# Patient Record
Sex: FEMALE | Race: WHITE | Employment: OTHER | URBAN - METROPOLITAN AREA
[De-identification: names, ages, dates, MRNs, and addresses within clinical notes are randomized per-mention and may not be internally consistent; named-entity substitution may affect disease eponyms.]

---

## 2021-01-11 ENCOUNTER — OFFICE VISIT (OUTPATIENT)
Dept: RHEUMATOLOGY | Facility: CLINIC | Age: 66
End: 2021-01-11
Payer: MEDICARE

## 2021-01-11 VITALS
WEIGHT: 138 LBS | SYSTOLIC BLOOD PRESSURE: 118 MMHG | DIASTOLIC BLOOD PRESSURE: 68 MMHG | RESPIRATION RATE: 16 BRPM | TEMPERATURE: 98 F | HEART RATE: 60 BPM | BODY MASS INDEX: 24.45 KG/M2 | HEIGHT: 63 IN

## 2021-01-11 DIAGNOSIS — M81.0 OSTEOPOROSIS, UNSPECIFIED OSTEOPOROSIS TYPE, UNSPECIFIED PATHOLOGICAL FRACTURE PRESENCE: ICD-10-CM

## 2021-01-11 DIAGNOSIS — R53.82 CHRONIC FATIGUE, UNSPECIFIED: ICD-10-CM

## 2021-01-11 DIAGNOSIS — E55.9 VITAMIN D DEFICIENCY: ICD-10-CM

## 2021-01-11 DIAGNOSIS — M06.9 RHEUMATOID ARTHRITIS INVOLVING MULTIPLE SITES, UNSPECIFIED WHETHER RHEUMATOID FACTOR PRESENT (HCC): Primary | ICD-10-CM

## 2021-01-11 DIAGNOSIS — M15.9 PRIMARY OSTEOARTHRITIS INVOLVING MULTIPLE JOINTS: ICD-10-CM

## 2021-01-11 DIAGNOSIS — M25.532 PAIN IN BOTH WRISTS: ICD-10-CM

## 2021-01-11 DIAGNOSIS — M25.561 CHRONIC PAIN OF BOTH KNEES: ICD-10-CM

## 2021-01-11 DIAGNOSIS — M25.562 CHRONIC PAIN OF BOTH KNEES: ICD-10-CM

## 2021-01-11 DIAGNOSIS — Z79.899 HIGH RISK MEDICATION USE: ICD-10-CM

## 2021-01-11 DIAGNOSIS — G89.29 CHRONIC PAIN OF BOTH KNEES: ICD-10-CM

## 2021-01-11 DIAGNOSIS — M25.531 PAIN IN BOTH WRISTS: ICD-10-CM

## 2021-01-11 DIAGNOSIS — Z96.653 HISTORY OF BILATERAL KNEE REPLACEMENT: ICD-10-CM

## 2021-01-11 PROCEDURE — 99205 OFFICE O/P NEW HI 60 MIN: CPT | Performed by: INTERNAL MEDICINE

## 2021-01-11 RX ORDER — ETANERCEPT 50 MG/ML
SOLUTION SUBCUTANEOUS
COMMUNITY
Start: 2020-12-01 | End: 2021-06-18

## 2021-01-11 RX ORDER — FOLIC ACID/MULTIVIT,IRON,MINER 0.4MG-18MG
TABLET ORAL
COMMUNITY

## 2021-01-11 RX ORDER — LEUCOVORIN CALCIUM 5 MG/1
TABLET ORAL
COMMUNITY
Start: 2020-10-08 | End: 2021-01-11

## 2021-01-11 RX ORDER — LEUCOVORIN CALCIUM 5 MG/1
5 TABLET ORAL DAILY
Qty: 90 TABLET | Refills: 0 | Status: SHIPPED | OUTPATIENT
Start: 2021-01-11 | End: 2021-04-11

## 2021-01-11 RX ORDER — LEVOTHYROXINE SODIUM 88 UG/1
TABLET ORAL
COMMUNITY
Start: 2020-10-26

## 2021-01-11 NOTE — PROGRESS NOTES
?  RHEUMATOLOGY NEW PATIENT   Date of visit: 1/11/2021  ? Patient presents with:  Establish Care: new pt. Previous rheum Dr. Cesar Hendrickson ([facility] [place]). Dx with RA about 7 years ago. Both knees replaced and surgery on both wrists.  Join independently interpreting results and communicating results to the patient/family/caregiver and care coordination with the patient's other providers.   ?  Plan:  Diagnoses and all orders for this visit:    Rheumatoid arthritis involving multiple sites, uns came back. Was seen by rheumatologist in [place]. Switched rheum over time due to initial one retiring. Was started on combination of Enbrel and methotrexate about 7 years ago. Was on higher methotrexate dosing but had side effect- due to nausea.  Improv to conceive afterwards and pregnancy relatively normal   + chronic constipation, no blood in stools; last colonoscopy was normal per pt.  No diverticulosis/diverticulitis   + hx of plantar fasciitis, improved with in soles   + rare bloody nose, attributes t file    Medications:    •  ENBREL SURECLICK 50 MG/ML Subcutaneous Solution Auto-injector, , Disp: , Rfl:     •  Levothyroxine Sodium 88 MCG Oral Tab, , Disp: , Rfl:     •  Turmeric Curcumin 500 MG Oral Cap, Take by mouth., Disp: , Rfl:     •  Krill Oil 350 Genitourinary: Negative for frequency and urgency. Musculoskeletal: Positive for back pain, joint pain and neck pain. Skin: Negative for itching and rash. Neurological: Negative for dizziness, tingling, seizures, weakness and headaches.    Endo/Heme Lymphadenopathy:     She has no cervical adenopathy. Neurological: She is alert and oriented to person, place, and time. No cranial nerve deficit. Skin: Skin is warm and dry. No rash noted. She is not diaphoretic. No erythema.    Psychiatric: She has

## 2021-01-27 ENCOUNTER — LAB ENCOUNTER (OUTPATIENT)
Dept: LAB | Age: 66
End: 2021-01-27
Attending: INTERNAL MEDICINE
Payer: MEDICARE

## 2021-01-27 ENCOUNTER — TELEPHONE (OUTPATIENT)
Dept: RHEUMATOLOGY | Facility: CLINIC | Age: 66
End: 2021-01-27

## 2021-01-27 DIAGNOSIS — Z79.899 HIGH RISK MEDICATION USE: ICD-10-CM

## 2021-01-27 DIAGNOSIS — E55.9 VITAMIN D DEFICIENCY: ICD-10-CM

## 2021-01-27 DIAGNOSIS — R53.82 CHRONIC FATIGUE, UNSPECIFIED: ICD-10-CM

## 2021-01-27 DIAGNOSIS — M06.9 RHEUMATOID ARTHRITIS INVOLVING MULTIPLE SITES, UNSPECIFIED WHETHER RHEUMATOID FACTOR PRESENT (HCC): ICD-10-CM

## 2021-01-27 LAB
ALBUMIN SERPL-MCNC: 3.8 G/DL (ref 3.4–5)
ALBUMIN/GLOB SERPL: 1.1 {RATIO} (ref 1–2)
ALP LIVER SERPL-CCNC: 78 U/L
ALT SERPL-CCNC: 29 U/L
ANION GAP SERPL CALC-SCNC: 2 MMOL/L (ref 0–18)
AST SERPL-CCNC: 30 U/L (ref 15–37)
BASOPHILS # BLD AUTO: 0.04 X10(3) UL (ref 0–0.2)
BASOPHILS NFR BLD AUTO: 1 %
BILIRUB SERPL-MCNC: 0.4 MG/DL (ref 0.1–2)
BUN BLD-MCNC: 18 MG/DL (ref 7–18)
BUN/CREAT SERPL: 25.7 (ref 10–20)
CALCIUM BLD-MCNC: 9.8 MG/DL (ref 8.5–10.1)
CHLORIDE SERPL-SCNC: 106 MMOL/L (ref 98–112)
CO2 SERPL-SCNC: 32 MMOL/L (ref 21–32)
CREAT BLD-MCNC: 0.7 MG/DL
CRP SERPL-MCNC: <0.29 MG/DL (ref ?–0.3)
DEPRECATED RDW RBC AUTO: 52 FL (ref 35.1–46.3)
EOSINOPHIL # BLD AUTO: 0.11 X10(3) UL (ref 0–0.7)
EOSINOPHIL NFR BLD AUTO: 2.8 %
ERYTHROCYTE [DISTWIDTH] IN BLOOD BY AUTOMATED COUNT: 15.6 % (ref 11–15)
GLOBULIN PLAS-MCNC: 3.4 G/DL (ref 2.8–4.4)
GLUCOSE BLD-MCNC: 84 MG/DL (ref 70–99)
HAV IGM SER QL: NONREACTIVE
HBV CORE IGM SER QL: NONREACTIVE
HBV SURFACE AG SERPL QL IA: NONREACTIVE
HCT VFR BLD AUTO: 41.1 %
HCV AB SERPL QL IA: NONREACTIVE
HGB BLD-MCNC: 13 G/DL
IMM GRANULOCYTES # BLD AUTO: 0 X10(3) UL (ref 0–1)
IMM GRANULOCYTES NFR BLD: 0 %
LYMPHOCYTES # BLD AUTO: 1.38 X10(3) UL (ref 1–4)
LYMPHOCYTES NFR BLD AUTO: 34.8 %
M PROTEIN MFR SERPL ELPH: 7.2 G/DL (ref 6.4–8.2)
MCH RBC QN AUTO: 28.8 PG (ref 26–34)
MCHC RBC AUTO-ENTMCNC: 31.6 G/DL (ref 31–37)
MCV RBC AUTO: 90.9 FL
MONOCYTES # BLD AUTO: 0.38 X10(3) UL (ref 0.1–1)
MONOCYTES NFR BLD AUTO: 9.6 %
NEUTROPHILS # BLD AUTO: 2.06 X10 (3) UL (ref 1.5–7.7)
NEUTROPHILS # BLD AUTO: 2.06 X10(3) UL (ref 1.5–7.7)
NEUTROPHILS NFR BLD AUTO: 51.8 %
OSMOLALITY SERPL CALC.SUM OF ELEC: 291 MOSM/KG (ref 275–295)
PATIENT FASTING Y/N/NP: YES
PLATELET # BLD AUTO: 245 10(3)UL (ref 150–450)
POTASSIUM SERPL-SCNC: 4.4 MMOL/L (ref 3.5–5.1)
RBC # BLD AUTO: 4.52 X10(6)UL
SED RATE-ML: 11 MM/HR
SODIUM SERPL-SCNC: 140 MMOL/L (ref 136–145)
VIT D+METAB SERPL-MCNC: 54 NG/ML (ref 30–100)
WBC # BLD AUTO: 4 X10(3) UL (ref 4–11)

## 2021-01-27 PROCEDURE — 85652 RBC SED RATE AUTOMATED: CPT

## 2021-01-27 PROCEDURE — 80053 COMPREHEN METABOLIC PANEL: CPT

## 2021-01-27 PROCEDURE — 86480 TB TEST CELL IMMUN MEASURE: CPT

## 2021-01-27 PROCEDURE — 80074 ACUTE HEPATITIS PANEL: CPT

## 2021-01-27 PROCEDURE — 82306 VITAMIN D 25 HYDROXY: CPT

## 2021-01-27 PROCEDURE — 36415 COLL VENOUS BLD VENIPUNCTURE: CPT

## 2021-01-27 PROCEDURE — 86140 C-REACTIVE PROTEIN: CPT

## 2021-01-27 PROCEDURE — 85025 COMPLETE CBC W/AUTO DIFF WBC: CPT

## 2021-01-27 NOTE — TELEPHONE ENCOUNTER
Called pt and discussed that I received her records. Pt did not know she could get labs without physical orders. Will plan on getting updated labs for me sometime this week.   discussed that depending on these results, will make decision for next steps in m

## 2021-01-29 LAB
M TB IFN-G CD4+ T-CELLS BLD-ACNC: 0.03 IU/ML
M TB TUBERC IFN-G BLD QL: NEGATIVE
M TB TUBERC IGNF/MITOGEN IGNF CONTROL: 8.68 IU/ML
QUANTIFERON TB1 MINUS NIL: 0.01 IU/ML
QUANTIFERON TB2 MINUS NIL: 0.01 IU/ML

## 2021-02-01 ENCOUNTER — TELEPHONE (OUTPATIENT)
Dept: RHEUMATOLOGY | Facility: CLINIC | Age: 66
End: 2021-02-01

## 2021-02-01 DIAGNOSIS — Z79.899 HIGH RISK MEDICATION USE: ICD-10-CM

## 2021-02-01 DIAGNOSIS — M06.9 RHEUMATOID ARTHRITIS INVOLVING MULTIPLE SITES, UNSPECIFIED WHETHER RHEUMATOID FACTOR PRESENT (HCC): Primary | ICD-10-CM

## 2021-02-01 NOTE — TELEPHONE ENCOUNTER
Called pt and discussed test results at length. Labs grossly normal including inflammatory markers. Would avoid stepping up immunosuppression at this time. Pt agrees and thinks more swelling due to surgery rather than active RA.      Also discussed holding

## 2021-04-14 ENCOUNTER — TELEPHONE (OUTPATIENT)
Dept: ORTHOPEDICS CLINIC | Facility: CLINIC | Age: 66
End: 2021-04-14

## 2021-04-14 DIAGNOSIS — M25.562 PAIN IN BOTH KNEES, UNSPECIFIED CHRONICITY: Primary | ICD-10-CM

## 2021-04-14 DIAGNOSIS — M25.561 PAIN IN BOTH KNEES, UNSPECIFIED CHRONICITY: Primary | ICD-10-CM

## 2021-04-14 NOTE — TELEPHONE ENCOUNTER
Bilateral knee x-rays ordered
Confirmed irina townsend that it is brad knees
Patient is scheduled on 4/30/2021 with Dr. Shahbaz Nelson for fluid in her knees. Please advise if imaging is needed.
CONST: mild distress 2/2 pain.   EYES:  Sclera and conjunctiva clear.  CARD: Normal S1 S2; Normal rate and rhythm  RESP: Equal BS B/L, No wheezes, rhonchi or rales. No distress  GI: Soft, + TTP RLQ, no rebound or guarding, no CVA tenderness, non-distended.  MS: Normal ROM in all extremities. No edema of lower extremities, no calf pain, radial pulses 2+ bilaterally  SKIN: Warm, dry, no acute rashes. Good turgor  NEURO: A&Ox3, No focal deficits. Strength 5/5 with no sensory deficits

## 2021-04-19 ENCOUNTER — LAB ENCOUNTER (OUTPATIENT)
Dept: LAB | Age: 66
End: 2021-04-19
Attending: INTERNAL MEDICINE
Payer: MEDICARE

## 2021-04-30 ENCOUNTER — LAB ENCOUNTER (OUTPATIENT)
Dept: LAB | Age: 66
End: 2021-04-30
Attending: INTERNAL MEDICINE
Payer: MEDICARE

## 2021-04-30 ENCOUNTER — OFFICE VISIT (OUTPATIENT)
Dept: RHEUMATOLOGY | Facility: CLINIC | Age: 66
End: 2021-04-30
Payer: MEDICARE

## 2021-04-30 VITALS
WEIGHT: 135 LBS | HEIGHT: 63 IN | DIASTOLIC BLOOD PRESSURE: 70 MMHG | HEART RATE: 60 BPM | SYSTOLIC BLOOD PRESSURE: 110 MMHG | TEMPERATURE: 98 F | BODY MASS INDEX: 23.92 KG/M2

## 2021-04-30 DIAGNOSIS — M25.461 EFFUSION OF RIGHT KNEE: ICD-10-CM

## 2021-04-30 DIAGNOSIS — M25.462 SWELLING OF LEFT KNEE JOINT: Primary | ICD-10-CM

## 2021-04-30 DIAGNOSIS — M06.9 RHEUMATOID ARTHRITIS INVOLVING MULTIPLE SITES, UNSPECIFIED WHETHER RHEUMATOID FACTOR PRESENT (HCC): ICD-10-CM

## 2021-04-30 DIAGNOSIS — M25.462 EFFUSION, LEFT KNEE: Primary | ICD-10-CM

## 2021-04-30 PROCEDURE — 89060 EXAM SYNOVIAL FLUID CRYSTALS: CPT

## 2021-04-30 PROCEDURE — 99213 OFFICE O/P EST LOW 20 MIN: CPT | Performed by: INTERNAL MEDICINE

## 2021-04-30 PROCEDURE — 89050 BODY FLUID CELL COUNT: CPT

## 2021-04-30 PROCEDURE — 20610 DRAIN/INJ JOINT/BURSA W/O US: CPT | Performed by: INTERNAL MEDICINE

## 2021-04-30 PROCEDURE — 87070 CULTURE OTHR SPECIMN AEROBIC: CPT

## 2021-04-30 PROCEDURE — 87205 SMEAR GRAM STAIN: CPT

## 2021-04-30 RX ORDER — LEUCOVORIN CALCIUM 5 MG/1
5 TABLET ORAL DAILY
COMMUNITY
End: 2021-06-18

## 2021-04-30 NOTE — PROGRESS NOTES
Rheumatology Follow-up Note  =====================================================================================================    Date of visit: 4/30/2021  ? Patient presents with: Injection: Bilateral knee draingage.  7yrs ago had total knee surg % External Gel, Apply topically 4 (four) times daily. , Disp: , Rfl:       Modified Medications    No medications on file     There are no discontinued medications.   Past Medical History:  Past Medical History:   Diagnosis Date   • Hypothyroidism    • Rheum RDW 15.6 (H) 01/27/2021    NEPRELIM 2.06 01/27/2021    NEPERCENT 51.8 01/27/2021    LYPERCENT 34.8 01/27/2021    MOPERCENT 9.6 01/27/2021    EOPERCENT 2.8 01/27/2021    BAPERCENT 1.0 01/27/2021    NE 2.06 01/27/2021    LYMABS 1.38 01/27/2021    MOABSO 0 right knee  (primary encounter diagnosis)  Effusion, left knee  Rheumatoid arthritis involving multiple sites, unspecified whether rheumatoid factor present ([address]    Seropositive positive RF, positive CCP diagnosed in 2014.  Both knees replaced and surgery injected in subcutaneous space. 18 g needle inserted but no fluid was able to be aspirated. Patient tolerated procedure well without any immediate complications.   ?  Plan:  Diagnoses and all orders for this visit:    Effusion of right knee  -     EXAM,SYN

## 2021-05-03 ENCOUNTER — TELEPHONE (OUTPATIENT)
Dept: RHEUMATOLOGY | Facility: CLINIC | Age: 66
End: 2021-05-03

## 2021-05-03 DIAGNOSIS — M06.9 RHEUMATOID ARTHRITIS INVOLVING MULTIPLE SITES, UNSPECIFIED WHETHER RHEUMATOID FACTOR PRESENT (HCC): Primary | ICD-10-CM

## 2021-05-03 RX ORDER — PREDNISONE 1 MG/1
TABLET ORAL
Qty: 28 TABLET | Refills: 0 | Status: SHIPPED | OUTPATIENT
Start: 2021-05-03 | End: 2021-05-24

## 2021-05-03 NOTE — TELEPHONE ENCOUNTER
Called patient left knee synovial fluid consistent with inflammatory process. Cultures negative to date after 48 hours. Patient notes that she had relief after therapeutic aspiration for a day or so however effusion is building back up.   Given low suspic

## 2021-05-04 ENCOUNTER — PATIENT MESSAGE (OUTPATIENT)
Dept: RHEUMATOLOGY | Facility: CLINIC | Age: 66
End: 2021-05-04

## 2021-05-04 ENCOUNTER — LAB ENCOUNTER (OUTPATIENT)
Dept: LAB | Age: 66
End: 2021-05-04
Attending: INTERNAL MEDICINE
Payer: MEDICARE

## 2021-05-04 DIAGNOSIS — Z79.899 HIGH RISK MEDICATION USE: ICD-10-CM

## 2021-05-04 DIAGNOSIS — M06.9 RHEUMATOID ARTHRITIS INVOLVING MULTIPLE SITES, UNSPECIFIED WHETHER RHEUMATOID FACTOR PRESENT (HCC): ICD-10-CM

## 2021-05-04 PROCEDURE — 85652 RBC SED RATE AUTOMATED: CPT

## 2021-05-04 PROCEDURE — 80053 COMPREHEN METABOLIC PANEL: CPT

## 2021-05-04 PROCEDURE — 36415 COLL VENOUS BLD VENIPUNCTURE: CPT

## 2021-05-04 PROCEDURE — 86140 C-REACTIVE PROTEIN: CPT

## 2021-05-04 PROCEDURE — 85025 COMPLETE CBC W/AUTO DIFF WBC: CPT

## 2021-05-05 NOTE — TELEPHONE ENCOUNTER
From: David Bedoya  To: Kami Pham MD  Sent: 5/4/2021 7:53 PM CDT  Subject: Prescription Question    Hi Dr. Shilpa Quan when you called you said you were calling in a prescription to [facility] in [address] for Prednisone; however when I stoppe

## 2021-05-06 ENCOUNTER — TELEPHONE (OUTPATIENT)
Dept: RHEUMATOLOGY | Facility: CLINIC | Age: 66
End: 2021-05-06

## 2021-05-06 NOTE — TELEPHONE ENCOUNTER
Phoned pt pharmacy, pt unable to  her prednisone. Telephone orders required. Reviewed with Pharmacist Merle Luu.   predniSONE 5 MG Oral Tab 28 tablet 0 5/3/2021 5/24/2021    Sig - Route:  Take 2 tablets (10 mg total) by mouth daily for 7 days, THEN 1 t

## 2021-05-10 ENCOUNTER — TELEPHONE (OUTPATIENT)
Dept: RHEUMATOLOGY | Facility: CLINIC | Age: 66
End: 2021-05-10

## 2021-05-10 NOTE — TELEPHONE ENCOUNTER
No dental work pending. Prednisone taper is helping with knee joint arthritis. Discussed antiresorptive therapy briefly. Discussed risk and benefits. Patient is hesitant about this given side effects.   Will provide more information via [employer] to re

## 2021-05-11 ENCOUNTER — TELEPHONE (OUTPATIENT)
Dept: RHEUMATOLOGY | Facility: CLINIC | Age: 66
End: 2021-05-11

## 2021-06-01 NOTE — TELEPHONE ENCOUNTER
Future Appointments   Date Time Provider Aries Joseph   6/18/2021  8:30 AM Ana Stallings MD [facility]   10/8/2021  8:30 AM Brooke Goode DO [facility]

## 2021-06-17 ENCOUNTER — TELEPHONE (OUTPATIENT)
Dept: RHEUMATOLOGY | Facility: CLINIC | Age: 66
End: 2021-06-17

## 2021-06-17 DIAGNOSIS — M06.9 RHEUMATOID ARTHRITIS INVOLVING MULTIPLE SITES, UNSPECIFIED WHETHER RHEUMATOID FACTOR PRESENT (HCC): Primary | ICD-10-CM

## 2021-06-17 NOTE — TELEPHONE ENCOUNTER
Pt called to see if we got the request for refills and just to let you know that she is out of [facility] and would need it for Thursday. If approval could be done today so she could get it in the mail by then, she would appreciate it.

## 2021-06-17 NOTE — TELEPHONE ENCOUNTER
LOV 4-30-21 procedure  LOV with Dr OTOOLE 1-11-21    Last labs 1-27-21 reviewed by Dr SYDNIE Jerome to ?

## 2021-06-18 ENCOUNTER — TELEPHONE (OUTPATIENT)
Dept: RHEUMATOLOGY | Facility: CLINIC | Age: 66
End: 2021-06-18

## 2021-06-18 ENCOUNTER — OFFICE VISIT (OUTPATIENT)
Dept: RHEUMATOLOGY | Facility: CLINIC | Age: 66
End: 2021-06-18
Payer: MEDICARE

## 2021-06-18 VITALS
SYSTOLIC BLOOD PRESSURE: 145 MMHG | BODY MASS INDEX: 23.74 KG/M2 | TEMPERATURE: 97 F | DIASTOLIC BLOOD PRESSURE: 60 MMHG | HEART RATE: 81 BPM | HEIGHT: 63 IN | WEIGHT: 134 LBS

## 2021-06-18 DIAGNOSIS — M81.0 AGE-RELATED OSTEOPOROSIS WITHOUT CURRENT PATHOLOGICAL FRACTURE: ICD-10-CM

## 2021-06-18 DIAGNOSIS — M25.561 PAIN IN BOTH KNEES, UNSPECIFIED CHRONICITY: ICD-10-CM

## 2021-06-18 DIAGNOSIS — B36.9 FUNGAL RASH OF TRUNK: ICD-10-CM

## 2021-06-18 DIAGNOSIS — M25.562 PAIN IN BOTH KNEES, UNSPECIFIED CHRONICITY: ICD-10-CM

## 2021-06-18 DIAGNOSIS — M06.9 RHEUMATOID ARTHRITIS INVOLVING MULTIPLE SITES, UNSPECIFIED WHETHER RHEUMATOID FACTOR PRESENT (HCC): Primary | ICD-10-CM

## 2021-06-18 DIAGNOSIS — L30.9 DERMATITIS: ICD-10-CM

## 2021-06-18 PROCEDURE — 99214 OFFICE O/P EST MOD 30 MIN: CPT | Performed by: INTERNAL MEDICINE

## 2021-06-18 RX ORDER — ETANERCEPT 50 MG/ML
50 SOLUTION SUBCUTANEOUS WEEKLY
Qty: 4.2 ML | Refills: 5 | Status: SHIPPED | OUTPATIENT
Start: 2021-06-18 | End: 2021-08-27

## 2021-06-18 RX ORDER — LEUCOVORIN CALCIUM 5 MG/1
TABLET ORAL
Qty: 90 TABLET | Refills: 3 | Status: SHIPPED | OUTPATIENT
Start: 2021-06-18 | End: 2021-12-17 | Stop reason: ALTCHOICE

## 2021-06-18 RX ORDER — DICLOFENAC SODIUM 20 MG/G
1 SOLUTION TOPICAL 2 TIMES DAILY
Qty: 112 G | Refills: 3 | Status: SHIPPED | OUTPATIENT
Start: 2021-06-18 | End: 2021-12-17 | Stop reason: ALTCHOICE

## 2021-06-18 RX ORDER — CLOTRIMAZOLE 1 %
1 CREAM (GRAM) TOPICAL 2 TIMES DAILY
Qty: 12 G | Refills: 0 | Status: SHIPPED | OUTPATIENT
Start: 2021-06-18 | End: 2021-08-27

## 2021-06-18 NOTE — PROGRESS NOTES
Rheumatology Follow-up Note  =====================================================================================================      Date of visit: 6/18/2021  ? Patient presents with: Follow - Up: LOV 04/30/21, has been doing good.  Was on predniso 1 Application topically 2 (two) times a day., Disp: 112 g, Rfl: 3  METHOTREXATE 2.5 MG Oral Tab, TAKE 4 TABLETS ONCE A WEEK, Disp: 48 tablet, Rfl: 3  Leucovorin Calcium 5 MG Oral Tab, Take 5 mg by mouth daily. , Disp: , Rfl:   ENBREL SURECLICK 50 MG/ML Subc intact   Psych: normal affect. Skin: No lesions or rashes. MSK: 28 joint count performed. No evidence of synovitis in mcp, pip, dip, wrist, elbows, shoulders, hips, knees, ankles, mtp unless otherwise noted. Full ROM of elbows, wrists, knees.      PtGA: bilateral knees. 3 views obtained  Bilateral total knee components [employer] type implants. No progressive radiolucent lines to suggest component loosening. She has good overall alignment there are no fractures or dislocations.   The right knee has rheumatoid arthritis especially in knees after holiday of methotrexate and Enbrel in the setting of receiving her Covid vaccine.       Today, CDAI is 8, indicating low disease activity. Cont methotrexate 10 mg/week and leucovorin  Cont enbrel 50 mg  ?   R with the patient. Questions related to this recommended plan of care were answered. Communication to the referring provider, PCP, and/or the patient's other physicians relevant to this encounter was sent.     Thank you for referring this delightful patie

## 2021-06-18 NOTE — PATIENT INSTRUCTIONS
1.  Use clotrimazole cream twice daily for the next week. If no improvement after 1 week then start triamcinolone. 2.  The authorization for Pennsaid may take a while  3.   Get blood work for Dr. Ana Wesley in late August.  4.  Read more about Reclast and Pro

## 2021-06-21 RX ORDER — ETANERCEPT 50 MG/ML
50 SOLUTION SUBCUTANEOUS WEEKLY
Qty: 4.2 ML | Refills: 5 | OUTPATIENT
Start: 2021-06-21 | End: 2021-07-21

## 2021-07-12 ENCOUNTER — TELEPHONE (OUTPATIENT)
Dept: RHEUMATOLOGY | Facility: CLINIC | Age: 66
End: 2021-07-12

## 2021-07-12 NOTE — TELEPHONE ENCOUNTER
----- Message from Franca Morgan RN sent at 7/12/2021 12:34 PM CDT -----  Regarding: FW: Prescription Question  Contact: [phone]    ----- Message -----  From: Rei Kaopor  Sent: 7/12/2021  11:51 AM CDT  To: [facility] Clinical Staff  Subject: RE: Prescription Question                        Socorro Mario, I cannot remember what it was called, but Dr. Moris Mcnair has it in his notes. You'll have to check with him.

## 2021-07-29 ENCOUNTER — TELEPHONE (OUTPATIENT)
Dept: RHEUMATOLOGY | Facility: CLINIC | Age: 66
End: 2021-07-29

## 2021-07-29 ENCOUNTER — PATIENT MESSAGE (OUTPATIENT)
Dept: RHEUMATOLOGY | Facility: CLINIC | Age: 66
End: 2021-07-29

## 2021-07-29 RX ORDER — ETANERCEPT 50 MG/ML
12 SOLUTION SUBCUTANEOUS WEEKLY
Qty: 12 ML | Refills: 1 | Status: SHIPPED | OUTPATIENT
Start: 2021-07-29 | End: 2021-07-29

## 2021-07-29 RX ORDER — ETANERCEPT 50 MG/ML
8 SOLUTION SUBCUTANEOUS WEEKLY
Qty: 8 ML | Refills: 2 | Status: SHIPPED | OUTPATIENT
Start: 2021-07-29 | End: 2021-10-27

## 2021-07-29 NOTE — TELEPHONE ENCOUNTER
From: Eulalia Ramirez  To: Jacqueline Nation MD  Sent: 7/29/2021 12:33 PM CDT  Subject: Prescription Question    Hi Dr. Debby Castro.  Someone from your office sent in a new prescription to [facility] for Enbrel but when [facility] called me to set up delivery

## 2021-07-29 NOTE — TELEPHONE ENCOUNTER
phoned  [facility]. spoke with Braulio Multani, Pharmacist. They have been dispensing Enbrel refills for  56 days. Prescription verified. We can send for 90 days, they will then run it and process.    Phoned pt and explained to notify [facility] to establish

## 2021-08-23 ENCOUNTER — LAB ENCOUNTER (OUTPATIENT)
Dept: LAB | Age: 66
End: 2021-08-23
Attending: INTERNAL MEDICINE
Payer: MEDICARE

## 2021-08-23 DIAGNOSIS — M06.9 RHEUMATOID ARTHRITIS INVOLVING MULTIPLE SITES, UNSPECIFIED WHETHER RHEUMATOID FACTOR PRESENT (HCC): ICD-10-CM

## 2021-08-23 LAB
ALBUMIN SERPL-MCNC: 3.4 G/DL (ref 3.4–5)
ALBUMIN/GLOB SERPL: 0.9 {RATIO} (ref 1–2)
ALP LIVER SERPL-CCNC: 91 U/L
ALT SERPL-CCNC: 14 U/L
ANION GAP SERPL CALC-SCNC: 3 MMOL/L (ref 0–18)
AST SERPL-CCNC: 14 U/L (ref 15–37)
BASOPHILS # BLD AUTO: 0.05 X10(3) UL (ref 0–0.2)
BASOPHILS NFR BLD AUTO: 0.9 %
BILIRUB SERPL-MCNC: 0.2 MG/DL (ref 0.1–2)
BUN BLD-MCNC: 24 MG/DL (ref 7–18)
CALCIUM BLD-MCNC: 9.3 MG/DL (ref 8.5–10.1)
CHLORIDE SERPL-SCNC: 110 MMOL/L (ref 98–112)
CO2 SERPL-SCNC: 28 MMOL/L (ref 21–32)
CREAT BLD-MCNC: 0.8 MG/DL
CRP SERPL-MCNC: 1.14 MG/DL (ref ?–0.3)
EOSINOPHIL # BLD AUTO: 0.19 X10(3) UL (ref 0–0.7)
EOSINOPHIL NFR BLD AUTO: 3.3 %
ERYTHROCYTE [DISTWIDTH] IN BLOOD BY AUTOMATED COUNT: 14.6 %
GLOBULIN PLAS-MCNC: 3.9 G/DL (ref 2.8–4.4)
GLUCOSE BLD-MCNC: 82 MG/DL (ref 70–99)
HCT VFR BLD AUTO: 39.6 %
HGB BLD-MCNC: 11.8 G/DL
IMM GRANULOCYTES # BLD AUTO: 0.01 X10(3) UL (ref 0–1)
IMM GRANULOCYTES NFR BLD: 0.2 %
LYMPHOCYTES # BLD AUTO: 1.59 X10(3) UL (ref 1–4)
LYMPHOCYTES NFR BLD AUTO: 27.4 %
M PROTEIN MFR SERPL ELPH: 7.3 G/DL (ref 6.4–8.2)
MCH RBC QN AUTO: 26.9 PG (ref 26–34)
MCHC RBC AUTO-ENTMCNC: 29.8 G/DL (ref 31–37)
MCV RBC AUTO: 90.2 FL
MONOCYTES # BLD AUTO: 0.63 X10(3) UL (ref 0.1–1)
MONOCYTES NFR BLD AUTO: 10.9 %
NEUTROPHILS # BLD AUTO: 3.33 X10 (3) UL (ref 1.5–7.7)
NEUTROPHILS # BLD AUTO: 3.33 X10(3) UL (ref 1.5–7.7)
NEUTROPHILS NFR BLD AUTO: 57.3 %
OSMOLALITY SERPL CALC.SUM OF ELEC: 295 MOSM/KG (ref 275–295)
PATIENT FASTING Y/N/NP: YES
PLATELET # BLD AUTO: 352 10(3)UL (ref 150–450)
POTASSIUM SERPL-SCNC: 4.6 MMOL/L (ref 3.5–5.1)
RBC # BLD AUTO: 4.39 X10(6)UL
SED RATE-ML: 44 MM/HR
SODIUM SERPL-SCNC: 141 MMOL/L (ref 136–145)
WBC # BLD AUTO: 5.8 X10(3) UL (ref 4–11)

## 2021-08-23 PROCEDURE — 86140 C-REACTIVE PROTEIN: CPT

## 2021-08-23 PROCEDURE — 80053 COMPREHEN METABOLIC PANEL: CPT

## 2021-08-23 PROCEDURE — 85025 COMPLETE CBC W/AUTO DIFF WBC: CPT

## 2021-08-23 PROCEDURE — 36415 COLL VENOUS BLD VENIPUNCTURE: CPT

## 2021-08-23 PROCEDURE — 85652 RBC SED RATE AUTOMATED: CPT

## 2021-08-24 ENCOUNTER — PATIENT MESSAGE (OUTPATIENT)
Dept: RHEUMATOLOGY | Facility: CLINIC | Age: 66
End: 2021-08-24

## 2021-08-25 NOTE — TELEPHONE ENCOUNTER
Called [facility] at [phone] and left a voicemail requesting records of her synovial fluid results

## 2021-08-26 NOTE — TELEPHONE ENCOUNTER
Pt called and gave me the number to the lab, [phone]. I called and they are going to fax me the lab results.

## 2021-08-26 NOTE — TELEPHONE ENCOUNTER
[facility] called and said the results werent in yet, but will fax them as soon as they come in.

## 2021-08-27 ENCOUNTER — TELEPHONE (OUTPATIENT)
Dept: ORTHOPEDICS CLINIC | Facility: CLINIC | Age: 66
End: 2021-08-27

## 2021-08-27 ENCOUNTER — TELEPHONE (OUTPATIENT)
Dept: RHEUMATOLOGY | Facility: CLINIC | Age: 66
End: 2021-08-27

## 2021-08-27 ENCOUNTER — OFFICE VISIT (OUTPATIENT)
Dept: RHEUMATOLOGY | Facility: CLINIC | Age: 66
End: 2021-08-27
Payer: MEDICARE

## 2021-08-27 ENCOUNTER — LAB ENCOUNTER (OUTPATIENT)
Dept: LAB | Age: 66
End: 2021-08-27
Attending: INTERNAL MEDICINE
Payer: MEDICARE

## 2021-08-27 VITALS
TEMPERATURE: 98 F | RESPIRATION RATE: 16 BRPM | HEART RATE: 94 BPM | HEIGHT: 63 IN | DIASTOLIC BLOOD PRESSURE: 64 MMHG | SYSTOLIC BLOOD PRESSURE: 108 MMHG | BODY MASS INDEX: 23.04 KG/M2 | WEIGHT: 130 LBS

## 2021-08-27 DIAGNOSIS — M25.462 EFFUSION OF BOTH KNEE JOINTS: ICD-10-CM

## 2021-08-27 DIAGNOSIS — D84.9 IMMUNOCOMPROMISED (HCC): ICD-10-CM

## 2021-08-27 DIAGNOSIS — Z79.899 HIGH RISK MEDICATION USE: ICD-10-CM

## 2021-08-27 DIAGNOSIS — M06.9 RHEUMATOID ARTHRITIS INVOLVING MULTIPLE SITES, UNSPECIFIED WHETHER RHEUMATOID FACTOR PRESENT (HCC): ICD-10-CM

## 2021-08-27 DIAGNOSIS — M25.461 EFFUSION OF BOTH KNEE JOINTS: ICD-10-CM

## 2021-08-27 DIAGNOSIS — M17.0 PRIMARY OSTEOARTHRITIS OF BOTH KNEES: ICD-10-CM

## 2021-08-27 DIAGNOSIS — M06.9 RHEUMATOID ARTHRITIS INVOLVING MULTIPLE SITES, UNSPECIFIED WHETHER RHEUMATOID FACTOR PRESENT (HCC): Primary | ICD-10-CM

## 2021-08-27 PROCEDURE — 99215 OFFICE O/P EST HI 40 MIN: CPT | Performed by: INTERNAL MEDICINE

## 2021-08-27 PROCEDURE — 36415 COLL VENOUS BLD VENIPUNCTURE: CPT

## 2021-08-27 PROCEDURE — 87040 BLOOD CULTURE FOR BACTERIA: CPT

## 2021-08-27 NOTE — TELEPHONE ENCOUNTER
Called Ortho to get pt an appointment due to possible synovial fluid infection. Dr. Maxx Zendejas is booked until October due to going out of town. How would you like to proceed?

## 2021-08-27 NOTE — TELEPHONE ENCOUNTER
Spoke with ortho group, was able to get patient in sooner, scheduled patient with Dr. Edenilson Payton at [facility] 8/30/21, 1:40 PM.  Will notify patient of new scheduled time, thank you.

## 2021-08-27 NOTE — TELEPHONE ENCOUNTER
Took call from Dr. Ezio Aparicio office. Pt has possible infection in synovial fluid in knee. Appt made with Dr. Lionel Yadav on 8/30. Scanned xray reports from 4/2021. Pt has bilateral knee replacements. Any additional imaging needed?

## 2021-08-27 NOTE — TELEPHONE ENCOUNTER
Address is [address] [place] [address]. Called pt and informed her of the date and time. Pt verbalized understanding.

## 2021-08-27 NOTE — PROGRESS NOTES
RHEUMATOLOGY FOLLOW UP   Date of visit: 08/27/2021  ? Patient presents with:  Rheumatoid Arthritis: 2 month f/u. Feeling pretty good this week. Left knee barely swollen and right knee is back to normal. Here to go over synovial fluid results.  Rapid scor patient/family/caregiver, ordering medications or testing, referring and communicating with other healthcare providers, documenting clinical information in the E HR, independently interpreting results and communicating results to the patient/family/caregiv HPI from initial consultation  referred for rheumatologic evaluation due to transition of care since she moved to the area. She has long standing RA.       States she first had pain about 2013, and woke up with severe pain- couldn't walk or use arms toes that are  but not clear if due to RA  + hair loss/thinning attributed to methotrexate   + hx of borderline anemia since young age. Was following with hematologist was told there was an abnormal protein in blood.  Had to go through bone marrow hx of right knee surgical in 20's  Hx appy    Family History:  Family History   Problem Relation Age of Onset   • Other (lung cancer) Mother    • Other (lung cancer) Father    • Arthritis Sister    • Diabetes Brother        Social History:  Social Histo vision and photophobia. Respiratory: Negative for cough, hemoptysis, shortness of breath and wheezing. Cardiovascular: Negative for chest pain, palpitations and leg swelling. Gastrointestinal: Positive for heartburn (hx of hiatla hernia).  Negative f General: Tenderness and deformity present. Cervical back: Neck supple. Comments: Diffuse OA changes of the hands b/l. Chronic enlargement of MCPs minimal scattered swelling, only present over the right index MCP.   B/l wrists with swelling and te 14 (L) 08/23/2021    ALT 14 08/23/2021    BILT 0.2 08/23/2021    TP 7.3 08/23/2021    ALB 3.4 08/23/2021    GLOBULIN 3.9 08/23/2021     08/23/2021    K 4.6 08/23/2021     08/23/2021    CO2 28.0 08/23/2021       Additional Labs:  08/2021  ESR 44

## 2021-08-30 ENCOUNTER — TELEPHONE (OUTPATIENT)
Dept: ORTHOPEDICS CLINIC | Facility: CLINIC | Age: 66
End: 2021-08-30

## 2021-08-30 ENCOUNTER — OFFICE VISIT (OUTPATIENT)
Dept: ORTHOPEDICS CLINIC | Facility: CLINIC | Age: 66
End: 2021-08-30
Payer: MEDICARE

## 2021-08-30 ENCOUNTER — TELEPHONE (OUTPATIENT)
Dept: RHEUMATOLOGY | Facility: CLINIC | Age: 66
End: 2021-08-30

## 2021-08-30 VITALS — HEIGHT: 63 IN | BODY MASS INDEX: 23.04 KG/M2 | WEIGHT: 130 LBS

## 2021-08-30 DIAGNOSIS — M25.562 LEFT KNEE PAIN, UNSPECIFIED CHRONICITY: Primary | ICD-10-CM

## 2021-08-30 DIAGNOSIS — Z96.653 STATUS POST TOTAL BILATERAL KNEE REPLACEMENT: Primary | ICD-10-CM

## 2021-08-30 DIAGNOSIS — M25.462 EFFUSION OF LEFT KNEE: ICD-10-CM

## 2021-08-30 PROCEDURE — 99203 OFFICE O/P NEW LOW 30 MIN: CPT | Performed by: ORTHOPAEDIC SURGERY

## 2021-08-30 NOTE — TELEPHONE ENCOUNTER
PT phoned office, Dr. Estephania Bullock does not believe there is any type of infection if her knee. Feels she would have more sign and symptoms. Fu in one month. Pt has been off Enbrel and methotrexate for one month.  Would like to see if Dr. Abby Umanzor recommends she goes

## 2021-08-30 NOTE — TELEPHONE ENCOUNTER
Future Appointments   Date Time Provider Aries Joseph   10/6/2021  8:20 AM Washington Goff MD [facility]       Patient is coming for LT Knee ref. by Lina Vega. No imaging done yet. Do we need views for patient's appt. ? Please advise.  Thanks

## 2021-08-30 NOTE — PROGRESS NOTES
[facility] New Consult    CC: Left knee swelling  HPI: The patient is a 74year old female with a history of rheumatoid arthritis status [place] bilateral knee [place] now referred for orthopaedic consultation by Dr. Arminda Hernandez 3   • Diclofenac Sodium (PENNSAID) 2 % External Solution Apply 1 Application topically 2 (two) times a day.  112 g 3   • METHOTREXATE 2.5 MG Oral Tab TAKE 4 TABLETS ONCE A WEEK 48 tablet 3   • Levothyroxine Sodium 88 MCG Oral Tab      • Turmeric Curcumin 50 status is intact on sensory, motor and perfusion assessment distally.     Assessment/Diagnoses:  Diagnoses and all orders for this visit:    Status post total bilateral knee replacement    Effusion of left knee      Plan: I had a long discussion with the pa

## 2021-08-30 NOTE — TELEPHONE ENCOUNTER
Spoke with patient who stated she already has had imaging completed within the last couple of months, and does not want new xrays completed. Pt will bring a copy of the disks with the images to Dr. Heide skinner.

## 2021-08-31 NOTE — TELEPHONE ENCOUNTER
Phoned pt and notified of Dr. Sharon Thacker note to advise pt to get third Covid vaccine/booster, particularly while she is off of her immunosuppressive medications.   Then we will consider resuming medications after her follow-up with Ortho in another month, silvia

## 2021-09-03 ENCOUNTER — PATIENT MESSAGE (OUTPATIENT)
Dept: RHEUMATOLOGY | Facility: CLINIC | Age: 66
End: 2021-09-03

## 2021-09-03 DIAGNOSIS — M06.9 RHEUMATOID ARTHRITIS INVOLVING MULTIPLE SITES, UNSPECIFIED WHETHER RHEUMATOID FACTOR PRESENT (HCC): Primary | ICD-10-CM

## 2021-09-05 RX ORDER — PREDNISONE 1 MG/1
5 TABLET ORAL DAILY
Qty: 450 TABLET | Refills: 0 | Status: SHIPPED | OUTPATIENT
Start: 2021-09-05 | End: 2021-12-17 | Stop reason: ALTCHOICE

## 2021-10-08 ENCOUNTER — OFFICE VISIT (OUTPATIENT)
Dept: ORTHOPEDICS CLINIC | Facility: CLINIC | Age: 66
End: 2021-10-08
Payer: MEDICARE

## 2021-10-08 ENCOUNTER — OFFICE VISIT (OUTPATIENT)
Dept: RHEUMATOLOGY | Facility: CLINIC | Age: 66
End: 2021-10-08
Payer: MEDICARE

## 2021-10-08 VITALS
DIASTOLIC BLOOD PRESSURE: 70 MMHG | OXYGEN SATURATION: 95 % | TEMPERATURE: 97 F | RESPIRATION RATE: 16 BRPM | HEIGHT: 63 IN | WEIGHT: 129 LBS | BODY MASS INDEX: 22.86 KG/M2 | SYSTOLIC BLOOD PRESSURE: 104 MMHG | HEART RATE: 104 BPM

## 2021-10-08 DIAGNOSIS — Z96.652 PAIN DUE TO TOTAL LEFT KNEE REPLACEMENT, INITIAL ENCOUNTER (HCC): Primary | ICD-10-CM

## 2021-10-08 DIAGNOSIS — Z96.653 HISTORY OF BILATERAL KNEE REPLACEMENT: ICD-10-CM

## 2021-10-08 DIAGNOSIS — M79.641 RIGHT HAND PAIN: ICD-10-CM

## 2021-10-08 DIAGNOSIS — D47.2 MGUS (MONOCLONAL GAMMOPATHY OF UNKNOWN SIGNIFICANCE): ICD-10-CM

## 2021-10-08 DIAGNOSIS — M25.462 EFFUSION OF BOTH KNEE JOINTS: ICD-10-CM

## 2021-10-08 DIAGNOSIS — R29.898 WEAKNESS OF BOTH LOWER EXTREMITIES: ICD-10-CM

## 2021-10-08 DIAGNOSIS — M06.9 RHEUMATOID ARTHRITIS INVOLVING MULTIPLE SITES, UNSPECIFIED WHETHER RHEUMATOID FACTOR PRESENT (HCC): Primary | ICD-10-CM

## 2021-10-08 DIAGNOSIS — T84.84XA PAIN DUE TO TOTAL LEFT KNEE REPLACEMENT, INITIAL ENCOUNTER (HCC): Primary | ICD-10-CM

## 2021-10-08 DIAGNOSIS — M81.0 AGE-RELATED OSTEOPOROSIS WITHOUT CURRENT PATHOLOGICAL FRACTURE: ICD-10-CM

## 2021-10-08 DIAGNOSIS — M25.461 EFFUSION OF BOTH KNEE JOINTS: ICD-10-CM

## 2021-10-08 DIAGNOSIS — Z79.899 HIGH RISK MEDICATION USE: ICD-10-CM

## 2021-10-08 PROCEDURE — 99215 OFFICE O/P EST HI 40 MIN: CPT | Performed by: INTERNAL MEDICINE

## 2021-10-08 PROCEDURE — 99213 OFFICE O/P EST LOW 20 MIN: CPT | Performed by: ORTHOPAEDIC SURGERY

## 2021-10-08 RX ORDER — MULTIVIT-MIN/IRON/FOLIC ACID/K 18-600-40
CAPSULE ORAL
COMMUNITY

## 2021-10-08 NOTE — PROGRESS NOTES
RHEUMATOLOGY FOLLOW UP   Date of visit: 10/8/2021  ? Patient presents with:  Rheumatoid Arthritis: one month f/u. Had third booster vaccine. Back on enbrel and methotrexate for about 3 weeks. Fluid still present in knees, but not as bad as it was.  Still another 1-2 weeks to monitor for toxicities since she has just restarted methotrexate. If labs okay, will continue current regimen. She will read about other treatment options and keep me updated if she changes her mind sooner.  She will also keep me update Standing  -     SED RATE, WESTERGREN (AUTOMATED);  Standing  -     C-REACTIVE PROTEIN; Standing  -     MONOCLONAL PROTEIN STUDY; Standing    Right hand pain    Age-related osteoporosis without current pathological fracture    MGUS (monoclonal gammopathy of seen by PCP first, given a shot of steroids, symptoms improved but within a week, symptoms came back. Was seen by rheumatologist in [place]. Switched rheum over time due to initial one retiring.   Was started on combination of Enbrel and methotrexate abou follow every 6 months. Told no reason for intervention. + hx of one early miscarriage, able to conceive afterwards and pregnancy relatively normal   + chronic constipation, no blood in stools; last colonoscopy was normal per pt.  No diverticulosis/diverti Smoking status: Passive Smoke Exposure - Never Smoker      Smokeless tobacco: Never Used    Vaping Use      Vaping Use: Never used    Alcohol use: Never    Drug use: Never    Medications:  Cholecalciferol (VITAMIN D) 50 MCG (2000 UT) Oral Cap, , Disp: , hiatla hernia). Negative for abdominal pain, blood in stool, diarrhea and nausea. Genitourinary: Negative for dysuria, frequency and hematuria. Musculoskeletal: Positive for back pain, joint pain and neck pain. Skin: Negative for itching and rash. and tenderness - resolved, no warmth but significant restriction in flexion/extension R>L. No swelling, tenderness, redness or restriction of motion of the elbows, ankles, or joints of the feet.   Bilateral shoulders with full ROM  Bilateral knees with me 3.4 08/23/2021    GLOBULIN 3.9 08/23/2021     08/23/2021    K 4.6 08/23/2021     08/23/2021    CO2 28.0 08/23/2021       Additional Labs:  08/2021  ESR 44  CRP 1.14    05/2021  CRP 0.42  ESR 24    01/2021  Vit D 54  CRP normal   ESR 11    Recor

## 2021-10-11 NOTE — H&P
[facility] New Patient Note    CC: Patient presents with:  Knee Pain: left knee, referred by JULIA Armendariz MD      HPI: This 74year old female presents today with complaints of left knee pain and intermittent swelling following knee replacement out-of-st topically 2 (two) times a day. 112 g 3   • METHOTREXATE 2.5 MG Oral Tab TAKE 4 TABLETS ONCE A WEEK 48 tablet 3   • Levothyroxine Sodium 88 MCG Oral Tab      • Turmeric Curcumin 500 MG Oral Cap Take by mouth. • Krill Oil 350 MG Oral Cap Take by mouth. suprapatellar pouch.   • Range of motion: Full extension of the knee  • Knee stable to varus and valgus stress, positive click to posterior drawer less than 5 mm   • neuromuscular: 5 out of 5 quad strength, sensation intact  • Vascular: Warm well perfused symptoms. We did discuss the most common reasons for pain, swelling, issues following knee replacement including instability, implant malposition, infection, loosening. Of the above, the highest concern would be for infection.   Patient has had multiple a provide benefit as it will not address the root cause of recurrent effusion secondary to synovitis of RA. Patient expressed understanding. She will follow up in one month.     Basim Armas MD, [address] [place] [address] [facility]  Suraj

## 2021-11-22 ENCOUNTER — OFFICE VISIT (OUTPATIENT)
Dept: ORTHOPEDICS CLINIC | Facility: CLINIC | Age: 66
End: 2021-11-22
Payer: MEDICARE

## 2021-11-22 ENCOUNTER — PATIENT MESSAGE (OUTPATIENT)
Dept: RHEUMATOLOGY | Facility: CLINIC | Age: 66
End: 2021-11-22

## 2021-11-22 ENCOUNTER — LAB ENCOUNTER (OUTPATIENT)
Dept: LAB | Age: 66
End: 2021-11-22
Attending: INTERNAL MEDICINE
Payer: MEDICARE

## 2021-11-22 VITALS — HEART RATE: 92 BPM | OXYGEN SATURATION: 98 % | BODY MASS INDEX: 22.64 KG/M2 | WEIGHT: 129.38 LBS | HEIGHT: 63.5 IN

## 2021-11-22 DIAGNOSIS — D47.2 MGUS (MONOCLONAL GAMMOPATHY OF UNKNOWN SIGNIFICANCE): ICD-10-CM

## 2021-11-22 DIAGNOSIS — Z96.652 PAIN DUE TO TOTAL LEFT KNEE REPLACEMENT, SUBSEQUENT ENCOUNTER: Primary | ICD-10-CM

## 2021-11-22 DIAGNOSIS — M06.9 RHEUMATOID ARTHRITIS FLARE (HCC): Primary | ICD-10-CM

## 2021-11-22 DIAGNOSIS — Z79.899 HIGH RISK MEDICATION USE: ICD-10-CM

## 2021-11-22 DIAGNOSIS — T84.84XD PAIN DUE TO TOTAL LEFT KNEE REPLACEMENT, SUBSEQUENT ENCOUNTER: Primary | ICD-10-CM

## 2021-11-22 DIAGNOSIS — M06.9 RHEUMATOID ARTHRITIS INVOLVING MULTIPLE SITES, UNSPECIFIED WHETHER RHEUMATOID FACTOR PRESENT (HCC): ICD-10-CM

## 2021-11-22 DIAGNOSIS — M25.461 EFFUSION OF BOTH KNEE JOINTS: ICD-10-CM

## 2021-11-22 DIAGNOSIS — M25.462 EFFUSION OF BOTH KNEE JOINTS: ICD-10-CM

## 2021-11-22 PROCEDURE — 80053 COMPREHEN METABOLIC PANEL: CPT

## 2021-11-22 PROCEDURE — 86140 C-REACTIVE PROTEIN: CPT

## 2021-11-22 PROCEDURE — 85025 COMPLETE CBC W/AUTO DIFF WBC: CPT

## 2021-11-22 PROCEDURE — 86334 IMMUNOFIX E-PHORESIS SERUM: CPT

## 2021-11-22 PROCEDURE — 83883 ASSAY NEPHELOMETRY NOT SPEC: CPT

## 2021-11-22 PROCEDURE — 84165 PROTEIN E-PHORESIS SERUM: CPT

## 2021-11-22 PROCEDURE — 99213 OFFICE O/P EST LOW 20 MIN: CPT | Performed by: ORTHOPAEDIC SURGERY

## 2021-11-22 PROCEDURE — 36415 COLL VENOUS BLD VENIPUNCTURE: CPT

## 2021-11-22 PROCEDURE — 85652 RBC SED RATE AUTOMATED: CPT

## 2021-11-22 NOTE — PROGRESS NOTES
[facility] Progress Note    Subjective: Patient returns to discuss her left knee.   She states that since last being seen 6 weeks ago, she has largely been on her once weekly DMARD regularly but has had to interrupted a few times, including 2 weeks be rheumatologist, and from a surgical standpoint I would not recommend intervention at this point. She expressed understanding with this, will call us if there are any questions concerns or issues in the future.     Winnie Pena MD, [facility]

## 2021-11-23 ENCOUNTER — TELEPHONE (OUTPATIENT)
Dept: RHEUMATOLOGY | Facility: CLINIC | Age: 66
End: 2021-11-23

## 2021-11-23 DIAGNOSIS — D47.2 MGUS (MONOCLONAL GAMMOPATHY OF UNKNOWN SIGNIFICANCE): Primary | ICD-10-CM

## 2021-11-24 RX ORDER — PREDNISONE 10 MG/1
TABLET ORAL
Qty: 32 TABLET | Refills: 0 | Status: SHIPPED | OUTPATIENT
Start: 2021-11-24 | End: 2021-12-17 | Stop reason: ALTCHOICE

## 2021-11-24 NOTE — PROGRESS NOTES
Call pt: Small M-spike, likely monoclonal gammopathy of unclear significant (MGUS). Vast majority of the time this is benign and nothing to worry about, but would have her see hematology just to be safe.  Dr. Nilam Spann or Ahmet Caraballo

## 2021-12-02 ENCOUNTER — TELEPHONE (OUTPATIENT)
Dept: RHEUMATOLOGY | Facility: CLINIC | Age: 66
End: 2021-12-02

## 2021-12-02 NOTE — TELEPHONE ENCOUNTER
Future Appointments   Date Time Provider Aries Joseph   12/17/2021  1:00 PM Anson Eduardo MD [facility] [address]   2/1/2022  8:30 AM Carl Goode,  [facility]

## 2021-12-06 ENCOUNTER — TELEPHONE (OUTPATIENT)
Dept: RHEUMATOLOGY | Facility: CLINIC | Age: 66
End: 2021-12-06

## 2021-12-06 DIAGNOSIS — M06.9 RHEUMATOID ARTHRITIS INVOLVING MULTIPLE SITES, UNSPECIFIED WHETHER RHEUMATOID FACTOR PRESENT (HCC): Primary | ICD-10-CM

## 2021-12-06 RX ORDER — ABATACEPT 125 MG/ML
125 INJECTION, SOLUTION SUBCUTANEOUS WEEKLY
Qty: 4 EACH | Refills: 2 | Status: SHIPPED | OUTPATIENT
Start: 2021-12-06 | End: 2021-12-07

## 2021-12-06 NOTE — TELEPHONE ENCOUNTER
Pt phoned office for status of Orencia, still pending. Phoned [facility], no Pa required per health plan. Must fill at [facility], prescription transferred from [facility]. Phoned pt to notify of approval, request [facility].  Will send prescr

## 2021-12-07 ENCOUNTER — TELEPHONE (OUTPATIENT)
Dept: RHEUMATOLOGY | Facility: CLINIC | Age: 66
End: 2021-12-07

## 2021-12-07 DIAGNOSIS — M06.9 RHEUMATOID ARTHRITIS INVOLVING MULTIPLE SITES, UNSPECIFIED WHETHER RHEUMATOID FACTOR PRESENT (HCC): ICD-10-CM

## 2021-12-07 RX ORDER — ABATACEPT 125 MG/ML
125 INJECTION, SOLUTION SUBCUTANEOUS WEEKLY
Qty: 8 EACH | Refills: 2 | Status: SHIPPED | OUTPATIENT
Start: 2021-12-07 | End: 2022-02-01

## 2021-12-07 NOTE — TELEPHONE ENCOUNTER
Pt phoned office, requesting Orencia to be ordered as 8 pens for 56 days. Out of pocket cost the same.

## 2021-12-17 ENCOUNTER — OFFICE VISIT (OUTPATIENT)
Dept: HEMATOLOGY/ONCOLOGY | Facility: HOSPITAL | Age: 66
End: 2021-12-17
Attending: INTERNAL MEDICINE
Payer: MEDICARE

## 2021-12-17 VITALS
HEART RATE: 105 BPM | DIASTOLIC BLOOD PRESSURE: 78 MMHG | SYSTOLIC BLOOD PRESSURE: 116 MMHG | HEIGHT: 63.5 IN | OXYGEN SATURATION: 97 % | RESPIRATION RATE: 16 BRPM | TEMPERATURE: 97 F | BODY MASS INDEX: 23.1 KG/M2 | WEIGHT: 132 LBS

## 2021-12-17 DIAGNOSIS — D47.2 MGUS (MONOCLONAL GAMMOPATHY OF UNKNOWN SIGNIFICANCE): Primary | ICD-10-CM

## 2021-12-17 PROCEDURE — 99205 OFFICE O/P NEW HI 60 MIN: CPT | Performed by: INTERNAL MEDICINE

## 2021-12-17 NOTE — CONSULTS
[facility] Report of Consultation    Patient Name: Dennie Finder   YOB: 1955   Medical Record Number: [id number]   CSN: [id number]   Consulting Physician: Casey Chowdhury M.D. Referring Physician: No ref.  provider found    Date o Concern: Not Asked        Exercise: Not Asked        Seat Belt: Not Asked        Special Diet: Not Asked        Stress Concern: Not Asked        Weight Concern: Not Asked    Social History Narrative      Not on file    Social Determinants of [facility] Allergies:    Penicillins             HIVES  Septra [Sulfamethox*    OTHER (SEE COMMENTS)    Comment:Lowers white blood count  Sulfa Antibiotics       OTHER (SEE COMMENTS)    Comment:Effects heart     Review of Systems:    Constitutional Normal - No fe Hematologic/Lymphatic Normal - No petechiae or purpura. No tender or palpable lymph nodes in the cervical, supraclavicular, axillary or inguinal area. Respiratory Normal - Lungs are clear to auscultation without rhonchi or wheezing.    Cardiovascular N A/G Ratio Latest Ref Range: 1.0 - 2.0  0.8 (L)    KAPPA FREE LIGHT CHAIN Latest Ref Range: 0.330 - 1.940 mg/dL  1.794   LAMBDA FREE LIGHT CHAIN Latest Ref Range: 0.571 - 2.630 mg/dL  4.710 (H)   KAPPA/LAMBDA FLC RATIO Latest Ref Range: 0.26 - 1.65   0.38 Neutrophils % Latest Units: % 60.0    Lymphocytes % Latest Units: % 25.4    Monocytes % Latest Units: % 9.7    Eosinophils % Latest Units: % 3.9    Basophils % Latest Units: % 0.8    Immature Granulocyte % Latest Units: % 0.2    SED RATE Latest Ref Range

## 2022-01-04 ENCOUNTER — PATIENT MESSAGE (OUTPATIENT)
Dept: RHEUMATOLOGY | Facility: CLINIC | Age: 67
End: 2022-01-04

## 2022-01-06 ENCOUNTER — OFFICE VISIT (OUTPATIENT)
Dept: ORTHOPEDICS CLINIC | Facility: CLINIC | Age: 67
End: 2022-01-06
Payer: MEDICARE

## 2022-01-06 ENCOUNTER — HOSPITAL ENCOUNTER (OUTPATIENT)
Dept: GENERAL RADIOLOGY | Age: 67
Discharge: HOME | End: 2022-01-06
Attending: ORTHOPAEDIC SURGERY
Payer: MEDICARE

## 2022-01-06 VITALS — HEIGHT: 63 IN | BODY MASS INDEX: 22.5 KG/M2 | WEIGHT: 127 LBS

## 2022-01-06 DIAGNOSIS — M25.511 RIGHT SHOULDER PAIN, UNSPECIFIED CHRONICITY: Primary | ICD-10-CM

## 2022-01-06 DIAGNOSIS — M75.41 IMPINGEMENT SYNDROME OF RIGHT SHOULDER: ICD-10-CM

## 2022-01-06 DIAGNOSIS — M25.511 RIGHT SHOULDER PAIN, UNSPECIFIED CHRONICITY: ICD-10-CM

## 2022-01-06 PROCEDURE — 73030 X-RAY EXAM OF SHOULDER: CPT | Performed by: ORTHOPAEDIC SURGERY

## 2022-01-06 PROCEDURE — 99213 OFFICE O/P EST LOW 20 MIN: CPT | Performed by: ORTHOPAEDIC SURGERY

## 2022-01-06 NOTE — PROGRESS NOTES
[facility] New Consult    CC: Patient presents with:  Shoulder Pain: Patient is here for right shoulder pain. She states that the pain has been ongoing for a couple of months.        HPI: The patient is a 74year old female presents with a 2-mon Age of Onset   • Other (lung cancer) Mother    • Other (lung cancer) Father    • Arthritis Sister    • Diabetes Brother      Social History    Occupational History      Not on file    Tobacco Use      Smoking status: Passive Smoke Exposure - Never Smoker degenerative changes seen at the [facility] joint. Lateral downslope to the acromion is noted.     Assessment/Diagnoses:  Diagnoses and all orders for this visit:    Right shoulder pain, unspecified chronicity  -     XR SHOULDER, COMPLETE (MIN 2 VIEWS), RIGHT (CPT

## 2022-01-10 NOTE — TELEPHONE ENCOUNTER
Called pt to inform her that her disability tax forms are filled out. Pt requested that they be mailed to her home. Will put them in the mail on 1/11/2022 when im back in the [place] office.

## 2022-02-01 ENCOUNTER — PATIENT MESSAGE (OUTPATIENT)
Dept: RHEUMATOLOGY | Facility: CLINIC | Age: 67
End: 2022-02-01

## 2022-02-01 ENCOUNTER — OFFICE VISIT (OUTPATIENT)
Dept: RHEUMATOLOGY | Facility: CLINIC | Age: 67
End: 2022-02-01
Payer: MEDICARE

## 2022-02-01 ENCOUNTER — LAB ENCOUNTER (OUTPATIENT)
Dept: LAB | Age: 67
End: 2022-02-01
Attending: INTERNAL MEDICINE
Payer: MEDICARE

## 2022-02-01 VITALS
OXYGEN SATURATION: 97 % | RESPIRATION RATE: 16 BRPM | HEART RATE: 91 BPM | WEIGHT: 130 LBS | SYSTOLIC BLOOD PRESSURE: 115 MMHG | HEIGHT: 63 IN | BODY MASS INDEX: 23.04 KG/M2 | DIASTOLIC BLOOD PRESSURE: 76 MMHG

## 2022-02-01 DIAGNOSIS — Z79.899 HIGH RISK MEDICATION USE: ICD-10-CM

## 2022-02-01 DIAGNOSIS — M06.9 RHEUMATOID ARTHRITIS INVOLVING MULTIPLE SITES, UNSPECIFIED WHETHER RHEUMATOID FACTOR PRESENT (HCC): ICD-10-CM

## 2022-02-01 DIAGNOSIS — M06.9 RHEUMATOID ARTHRITIS FLARE (HCC): Primary | ICD-10-CM

## 2022-02-01 DIAGNOSIS — R70.0 ELEVATED SED RATE: ICD-10-CM

## 2022-02-01 DIAGNOSIS — M06.9 RHEUMATOID ARTHRITIS FLARE (HCC): ICD-10-CM

## 2022-02-01 DIAGNOSIS — R79.82 ELEVATED C-REACTIVE PROTEIN (CRP): ICD-10-CM

## 2022-02-01 DIAGNOSIS — M81.0 AGE-RELATED OSTEOPOROSIS WITHOUT CURRENT PATHOLOGICAL FRACTURE: ICD-10-CM

## 2022-02-01 DIAGNOSIS — M25.462 EFFUSION OF BOTH KNEE JOINTS: ICD-10-CM

## 2022-02-01 DIAGNOSIS — R74.8 ELEVATED ALKALINE PHOSPHATASE LEVEL: ICD-10-CM

## 2022-02-01 DIAGNOSIS — Z96.653 HISTORY OF BILATERAL KNEE REPLACEMENT: ICD-10-CM

## 2022-02-01 DIAGNOSIS — M25.461 EFFUSION OF BOTH KNEE JOINTS: ICD-10-CM

## 2022-02-01 LAB
ALBUMIN SERPL-MCNC: 3.4 G/DL (ref 3.4–5)
ALBUMIN/GLOB SERPL: 0.9 {RATIO} (ref 1–2)
ALP LIVER SERPL-CCNC: 152 U/L
ALT SERPL-CCNC: 47 U/L
ANION GAP SERPL CALC-SCNC: 6 MMOL/L (ref 0–18)
AST SERPL-CCNC: 33 U/L (ref 15–37)
BASOPHILS # BLD AUTO: 0.04 X10(3) UL (ref 0–0.2)
BASOPHILS NFR BLD AUTO: 0.8 %
BILIRUB SERPL-MCNC: 0.3 MG/DL (ref 0.1–2)
BUN BLD-MCNC: 18 MG/DL (ref 7–18)
CALCIUM BLD-MCNC: 9.2 MG/DL (ref 8.5–10.1)
CHLORIDE SERPL-SCNC: 106 MMOL/L (ref 98–112)
CO2 SERPL-SCNC: 29 MMOL/L (ref 21–32)
CREAT BLD-MCNC: 0.66 MG/DL
CRP SERPL-MCNC: 1.3 MG/DL (ref ?–0.3)
EOSINOPHIL # BLD AUTO: 0.12 X10(3) UL (ref 0–0.7)
EOSINOPHIL NFR BLD AUTO: 2.5 %
ERYTHROCYTE [DISTWIDTH] IN BLOOD BY AUTOMATED COUNT: 17.6 %
ERYTHROCYTE [SEDIMENTATION RATE] IN BLOOD: 50 MM/HR
FASTING STATUS PATIENT QL REPORTED: NO
GLOBULIN PLAS-MCNC: 3.9 G/DL (ref 2.8–4.4)
GLUCOSE BLD-MCNC: 102 MG/DL (ref 70–99)
HCT VFR BLD AUTO: 38.9 %
HGB BLD-MCNC: 12.1 G/DL
IMM GRANULOCYTES # BLD AUTO: 0.01 X10(3) UL (ref 0–1)
IMM GRANULOCYTES NFR BLD: 0.2 %
LYMPHOCYTES # BLD AUTO: 1.33 X10(3) UL (ref 1–4)
LYMPHOCYTES NFR BLD AUTO: 27.9 %
MCHC RBC AUTO-ENTMCNC: 31.1 G/DL (ref 31–37)
MCV RBC AUTO: 84.2 FL
MONOCYTES # BLD AUTO: 0.53 X10(3) UL (ref 0.1–1)
MONOCYTES NFR BLD AUTO: 11.1 %
NEUTROPHILS # BLD AUTO: 2.73 X10 (3) UL (ref 1.5–7.7)
NEUTROPHILS # BLD AUTO: 2.73 X10(3) UL (ref 1.5–7.7)
NEUTROPHILS NFR BLD AUTO: 57.5 %
OSMOLALITY SERPL CALC.SUM OF ELEC: 294 MOSM/KG (ref 275–295)
PLATELET # BLD AUTO: 390 10(3)UL (ref 150–450)
POTASSIUM SERPL-SCNC: 4.2 MMOL/L (ref 3.5–5.1)
PROT SERPL-MCNC: 7.3 G/DL (ref 6.4–8.2)
RBC # BLD AUTO: 4.62 X10(6)UL
WBC # BLD AUTO: 4.8 X10(3) UL (ref 4–11)

## 2022-02-01 PROCEDURE — 86140 C-REACTIVE PROTEIN: CPT

## 2022-02-01 PROCEDURE — 36415 COLL VENOUS BLD VENIPUNCTURE: CPT

## 2022-02-01 PROCEDURE — 80053 COMPREHEN METABOLIC PANEL: CPT

## 2022-02-01 PROCEDURE — 96372 THER/PROPH/DIAG INJ SC/IM: CPT | Performed by: INTERNAL MEDICINE

## 2022-02-01 PROCEDURE — 99215 OFFICE O/P EST HI 40 MIN: CPT | Performed by: INTERNAL MEDICINE

## 2022-02-01 PROCEDURE — 85652 RBC SED RATE AUTOMATED: CPT

## 2022-02-01 PROCEDURE — 82977 ASSAY OF GGT: CPT

## 2022-02-01 PROCEDURE — 85025 COMPLETE CBC W/AUTO DIFF WBC: CPT

## 2022-02-01 RX ORDER — METHOTREXATE 2.5 MG/1
12.5 TABLET ORAL WEEKLY
Qty: 60 TABLET | Refills: 0 | Status: SHIPPED | OUTPATIENT
Start: 2022-02-01 | End: 2022-05-02

## 2022-02-01 RX ORDER — METHYLPREDNISOLONE ACETATE 40 MG/ML
40 INJECTION, SUSPENSION INTRA-ARTICULAR; INTRALESIONAL; INTRAMUSCULAR; SOFT TISSUE ONCE
Status: COMPLETED | OUTPATIENT
Start: 2022-02-01 | End: 2022-02-01

## 2022-02-01 RX ORDER — FOLIC ACID 1 MG/1
1 TABLET ORAL DAILY
Qty: 90 TABLET | Refills: 0 | Status: SHIPPED | OUTPATIENT
Start: 2022-02-01

## 2022-02-01 RX ADMIN — METHYLPREDNISOLONE ACETATE 40 MG: 40 INJECTION, SUSPENSION INTRA-ARTICULAR; INTRALESIONAL; INTRAMUSCULAR; SOFT TISSUE at 09:15:00

## 2022-02-01 NOTE — PATIENT INSTRUCTIONS
-- continue Orencia injections  -- restart methotrexate 5 tabs once weekly  -- restart folic acid 1mg daily  -- labs now and in a month to monitor for toxicities  -- start prednisone taper.  Take 20mg daily x 3 days, then 15mg daily x 3 days, then 10mg daily x 3 days, then 5mg daily x 3 days, then stop  -- we'll see how you respond to the steroid shot and we'll try to fit in a right shoulder injection in the office   -- follow up in 2 months or sooner as needed  -- keep me posted in the meantime    Dr. Chayito Merritt

## 2022-02-02 ENCOUNTER — PATIENT MESSAGE (OUTPATIENT)
Dept: RHEUMATOLOGY | Facility: CLINIC | Age: 67
End: 2022-02-02

## 2022-02-02 LAB — GGT SERPL-CCNC: 53 U/L

## 2022-03-02 ENCOUNTER — LABORATORY ENCOUNTER (OUTPATIENT)
Dept: LAB | Age: 67
End: 2022-03-02
Attending: INTERNAL MEDICINE
Payer: MEDICARE

## 2022-03-02 DIAGNOSIS — R79.82 ELEVATED C-REACTIVE PROTEIN (CRP): ICD-10-CM

## 2022-03-02 DIAGNOSIS — M06.9 RHEUMATOID ARTHRITIS INVOLVING MULTIPLE SITES, UNSPECIFIED WHETHER RHEUMATOID FACTOR PRESENT (HCC): ICD-10-CM

## 2022-03-02 DIAGNOSIS — R70.0 ELEVATED SED RATE: ICD-10-CM

## 2022-03-02 DIAGNOSIS — M06.9 RHEUMATOID ARTHRITIS FLARE (HCC): ICD-10-CM

## 2022-03-02 DIAGNOSIS — Z79.899 HIGH RISK MEDICATION USE: ICD-10-CM

## 2022-03-02 LAB
ALBUMIN SERPL-MCNC: 3.8 G/DL (ref 3.4–5)
ALBUMIN/GLOB SERPL: 1.2 {RATIO} (ref 1–2)
ALP LIVER SERPL-CCNC: 117 U/L
ALT SERPL-CCNC: 76 U/L
ANION GAP SERPL CALC-SCNC: 3 MMOL/L (ref 0–18)
AST SERPL-CCNC: 44 U/L (ref 15–37)
BASOPHILS # BLD AUTO: 0.03 X10(3) UL (ref 0–0.2)
BASOPHILS NFR BLD AUTO: 0.8 %
BILIRUB SERPL-MCNC: 0.4 MG/DL (ref 0.1–2)
BUN BLD-MCNC: 15 MG/DL (ref 7–18)
CALCIUM BLD-MCNC: 9.3 MG/DL (ref 8.5–10.1)
CHLORIDE SERPL-SCNC: 107 MMOL/L (ref 98–112)
CO2 SERPL-SCNC: 30 MMOL/L (ref 21–32)
CREAT BLD-MCNC: 0.64 MG/DL
EOSINOPHIL # BLD AUTO: 0.13 X10(3) UL (ref 0–0.7)
EOSINOPHIL NFR BLD AUTO: 3.4 %
ERYTHROCYTE [DISTWIDTH] IN BLOOD BY AUTOMATED COUNT: 19.8 %
ERYTHROCYTE [SEDIMENTATION RATE] IN BLOOD: 5 MM/HR
FASTING STATUS PATIENT QL REPORTED: YES
GLOBULIN PLAS-MCNC: 3.3 G/DL (ref 2.8–4.4)
GLUCOSE BLD-MCNC: 89 MG/DL (ref 70–99)
HCT VFR BLD AUTO: 43.5 %
HGB BLD-MCNC: 13.7 G/DL
IMM GRANULOCYTES # BLD AUTO: 0.01 X10(3) UL (ref 0–1)
IMM GRANULOCYTES NFR BLD: 0.3 %
LYMPHOCYTES # BLD AUTO: 1.37 X10(3) UL (ref 1–4)
LYMPHOCYTES NFR BLD AUTO: 35.9 %
MCH RBC QN AUTO: 27.2 PG (ref 26–34)
MCV RBC AUTO: 86.3 FL
MONOCYTES # BLD AUTO: 0.28 X10(3) UL (ref 0.1–1)
MONOCYTES NFR BLD AUTO: 7.3 %
NEUTROPHILS # BLD AUTO: 2 X10 (3) UL (ref 1.5–7.7)
NEUTROPHILS # BLD AUTO: 2 X10(3) UL (ref 1.5–7.7)
NEUTROPHILS NFR BLD AUTO: 52.3 %
OSMOLALITY SERPL CALC.SUM OF ELEC: 290 MOSM/KG (ref 275–295)
PLATELET # BLD AUTO: 291 10(3)UL (ref 150–450)
POTASSIUM SERPL-SCNC: 4.7 MMOL/L (ref 3.5–5.1)
PROT SERPL-MCNC: 7.1 G/DL (ref 6.4–8.2)
RBC # BLD AUTO: 5.04 X10(6)UL
SODIUM SERPL-SCNC: 140 MMOL/L (ref 136–145)

## 2022-03-02 PROCEDURE — 80053 COMPREHEN METABOLIC PANEL: CPT

## 2022-03-02 PROCEDURE — 86140 C-REACTIVE PROTEIN: CPT

## 2022-03-02 PROCEDURE — 85025 COMPLETE CBC W/AUTO DIFF WBC: CPT

## 2022-03-02 PROCEDURE — 36415 COLL VENOUS BLD VENIPUNCTURE: CPT

## 2022-03-02 PROCEDURE — 85652 RBC SED RATE AUTOMATED: CPT

## 2022-04-04 ENCOUNTER — LABORATORY ENCOUNTER (OUTPATIENT)
Dept: LAB | Age: 67
End: 2022-04-04
Attending: INTERNAL MEDICINE
Payer: MEDICARE

## 2022-04-04 DIAGNOSIS — R70.0 ELEVATED SED RATE: ICD-10-CM

## 2022-04-04 DIAGNOSIS — D47.2 MGUS (MONOCLONAL GAMMOPATHY OF UNKNOWN SIGNIFICANCE): ICD-10-CM

## 2022-04-04 DIAGNOSIS — R79.82 ELEVATED C-REACTIVE PROTEIN (CRP): ICD-10-CM

## 2022-04-04 DIAGNOSIS — M25.461 EFFUSION OF BOTH KNEE JOINTS: ICD-10-CM

## 2022-04-04 DIAGNOSIS — E55.9 VITAMIN D DEFICIENCY: ICD-10-CM

## 2022-04-04 DIAGNOSIS — Z79.899 HIGH RISK MEDICATION USE: ICD-10-CM

## 2022-04-04 DIAGNOSIS — M81.0 AGE-RELATED OSTEOPOROSIS WITHOUT CURRENT PATHOLOGICAL FRACTURE: ICD-10-CM

## 2022-04-04 DIAGNOSIS — M06.9 RHEUMATOID ARTHRITIS INVOLVING MULTIPLE SITES, UNSPECIFIED WHETHER RHEUMATOID FACTOR PRESENT (HCC): ICD-10-CM

## 2022-04-04 DIAGNOSIS — M06.9 RHEUMATOID ARTHRITIS FLARE (HCC): ICD-10-CM

## 2022-04-04 DIAGNOSIS — M25.462 EFFUSION OF BOTH KNEE JOINTS: ICD-10-CM

## 2022-04-04 LAB
ALBUMIN SERPL-MCNC: 3.7 G/DL (ref 3.4–5)
ALBUMIN/GLOB SERPL: 1.1 {RATIO} (ref 1–2)
ALP LIVER SERPL-CCNC: 113 U/L
ALT SERPL-CCNC: 65 U/L
ANION GAP SERPL CALC-SCNC: 6 MMOL/L (ref 0–18)
AST SERPL-CCNC: 46 U/L (ref 15–37)
BASOPHILS # BLD AUTO: 0.04 X10(3) UL (ref 0–0.2)
BASOPHILS NFR BLD AUTO: 0.8 %
BILIRUB SERPL-MCNC: 0.4 MG/DL (ref 0.1–2)
BUN BLD-MCNC: 13 MG/DL (ref 7–18)
CALCIUM BLD-MCNC: 9.1 MG/DL (ref 8.5–10.1)
CHLORIDE SERPL-SCNC: 108 MMOL/L (ref 98–112)
CO2 SERPL-SCNC: 28 MMOL/L (ref 21–32)
CREAT BLD-MCNC: 0.62 MG/DL
CRP SERPL-MCNC: 0.41 MG/DL (ref ?–0.3)
EOSINOPHIL # BLD AUTO: 0.1 X10(3) UL (ref 0–0.7)
EOSINOPHIL NFR BLD AUTO: 1.9 %
ERYTHROCYTE [DISTWIDTH] IN BLOOD BY AUTOMATED COUNT: 17.1 %
ERYTHROCYTE [SEDIMENTATION RATE] IN BLOOD: 24 MM/HR
FASTING STATUS PATIENT QL REPORTED: YES
GLOBULIN PLAS-MCNC: 3.3 G/DL (ref 2.8–4.4)
GLUCOSE BLD-MCNC: 94 MG/DL (ref 70–99)
HCT VFR BLD AUTO: 44 %
HGB BLD-MCNC: 13.4 G/DL
IMM GRANULOCYTES # BLD AUTO: 0.01 X10(3) UL (ref 0–1)
IMM GRANULOCYTES NFR BLD: 0.2 %
LYMPHOCYTES # BLD AUTO: 1.34 X10(3) UL (ref 1–4)
LYMPHOCYTES NFR BLD AUTO: 25.6 %
MCH RBC QN AUTO: 27 PG (ref 26–34)
MCHC RBC AUTO-ENTMCNC: 30.5 G/DL (ref 31–37)
MCV RBC AUTO: 88.7 FL
MONOCYTES # BLD AUTO: 0.49 X10(3) UL (ref 0.1–1)
MONOCYTES NFR BLD AUTO: 9.4 %
NEUTROPHILS # BLD AUTO: 3.26 X10 (3) UL (ref 1.5–7.7)
NEUTROPHILS # BLD AUTO: 3.26 X10(3) UL (ref 1.5–7.7)
NEUTROPHILS NFR BLD AUTO: 62.1 %
OSMOLALITY SERPL CALC.SUM OF ELEC: 294 MOSM/KG (ref 275–295)
PLATELET # BLD AUTO: 319 10(3)UL (ref 150–450)
POTASSIUM SERPL-SCNC: 4.5 MMOL/L (ref 3.5–5.1)
PROT SERPL-MCNC: 7 G/DL (ref 6.4–8.2)
RBC # BLD AUTO: 4.96 X10(6)UL
SODIUM SERPL-SCNC: 142 MMOL/L (ref 136–145)
WBC # BLD AUTO: 5.2 X10(3) UL (ref 4–11)

## 2022-04-04 PROCEDURE — 84100 ASSAY OF PHOSPHORUS: CPT

## 2022-04-04 PROCEDURE — 86334 IMMUNOFIX E-PHORESIS SERUM: CPT

## 2022-04-04 PROCEDURE — 36415 COLL VENOUS BLD VENIPUNCTURE: CPT

## 2022-04-04 PROCEDURE — 82565 ASSAY OF CREATININE: CPT

## 2022-04-04 PROCEDURE — 85652 RBC SED RATE AUTOMATED: CPT

## 2022-04-04 PROCEDURE — 86140 C-REACTIVE PROTEIN: CPT

## 2022-04-04 PROCEDURE — 84165 PROTEIN E-PHORESIS SERUM: CPT

## 2022-04-04 PROCEDURE — 80053 COMPREHEN METABOLIC PANEL: CPT

## 2022-04-04 PROCEDURE — 83970 ASSAY OF PARATHORMONE: CPT

## 2022-04-04 PROCEDURE — 82306 VITAMIN D 25 HYDROXY: CPT

## 2022-04-04 PROCEDURE — 85025 COMPLETE CBC W/AUTO DIFF WBC: CPT

## 2022-04-04 PROCEDURE — 83521 IG LIGHT CHAINS FREE EACH: CPT

## 2022-04-04 PROCEDURE — 82310 ASSAY OF CALCIUM: CPT

## 2022-04-05 ENCOUNTER — OFFICE VISIT (OUTPATIENT)
Dept: RHEUMATOLOGY | Facility: CLINIC | Age: 67
End: 2022-04-05
Payer: MEDICARE

## 2022-04-05 VITALS
WEIGHT: 136 LBS | HEIGHT: 63 IN | OXYGEN SATURATION: 96 % | HEART RATE: 66 BPM | DIASTOLIC BLOOD PRESSURE: 68 MMHG | RESPIRATION RATE: 16 BRPM | SYSTOLIC BLOOD PRESSURE: 118 MMHG | BODY MASS INDEX: 24.1 KG/M2

## 2022-04-05 DIAGNOSIS — M81.0 AGE-RELATED OSTEOPOROSIS WITHOUT CURRENT PATHOLOGICAL FRACTURE: ICD-10-CM

## 2022-04-05 DIAGNOSIS — R79.82 ELEVATED C-REACTIVE PROTEIN (CRP): ICD-10-CM

## 2022-04-05 DIAGNOSIS — R70.0 ELEVATED SED RATE: ICD-10-CM

## 2022-04-05 DIAGNOSIS — M25.462 EFFUSION OF BOTH KNEE JOINTS: ICD-10-CM

## 2022-04-05 DIAGNOSIS — M25.461 EFFUSION OF BOTH KNEE JOINTS: ICD-10-CM

## 2022-04-05 DIAGNOSIS — E55.9 VITAMIN D DEFICIENCY: ICD-10-CM

## 2022-04-05 DIAGNOSIS — Z79.899 HIGH RISK MEDICATION USE: ICD-10-CM

## 2022-04-05 DIAGNOSIS — Z96.653 HISTORY OF BILATERAL KNEE REPLACEMENT: ICD-10-CM

## 2022-04-05 DIAGNOSIS — M06.9 RHEUMATOID ARTHRITIS FLARE (HCC): ICD-10-CM

## 2022-04-05 DIAGNOSIS — M06.9 RHEUMATOID ARTHRITIS INVOLVING MULTIPLE SITES, UNSPECIFIED WHETHER RHEUMATOID FACTOR PRESENT (HCC): Primary | ICD-10-CM

## 2022-04-05 LAB
ALBUMIN SERPL ELPH-MCNC: 4 G/DL (ref 3.75–5.21)
ALBUMIN/GLOB SERPL: 1.34 {RATIO} (ref 1–2)
ALPHA1 GLOB SERPL ELPH-MCNC: 0.34 G/DL (ref 0.19–0.46)
ALPHA2 GLOB SERPL ELPH-MCNC: 0.88 G/DL (ref 0.48–1.05)
B-GLOBULIN SERPL ELPH-MCNC: 0.71 G/DL (ref 0.68–1.23)
CALCIUM BLD-MCNC: 9.2 MG/DL (ref 8.5–10.1)
CREAT BLD-MCNC: 0.64 MG/DL
GAMMA GLOB SERPL ELPH-MCNC: 1.06 G/DL (ref 0.62–1.7)
KAPPA LC FREE SER-MCNC: 1.62 MG/DL (ref 0.33–1.94)
KAPPA LC FREE/LAMBDA FREE SER NEPH: 0.36 {RATIO} (ref 0.26–1.65)
LAMBDA LC FREE SERPL-MCNC: 4.48 MG/DL (ref 0.57–2.63)
M PROTEIN 1 SERPL ELPH-MCNC: 0.6 G/DL (ref ?–0)
PHOSPHATE SERPL-MCNC: 3.4 MG/DL (ref 2.5–4.9)
PROT SERPL-MCNC: 7 G/DL (ref 6.4–8.2)
PTH-INTACT SERPL-MCNC: 37.4 PG/ML (ref 18.5–88)
VIT D+METAB SERPL-MCNC: 49 NG/ML (ref 30–100)

## 2022-04-05 PROCEDURE — 99215 OFFICE O/P EST HI 40 MIN: CPT | Performed by: INTERNAL MEDICINE

## 2022-04-05 RX ORDER — ABATACEPT 125 MG/ML
INJECTION, SOLUTION SUBCUTANEOUS
COMMUNITY
Start: 2022-02-01

## 2022-04-05 NOTE — PATIENT INSTRUCTIONS
-- start prednisone 2mg daily  -- hold orencia and start rinvoq one tablet daily one week after the next covid vaccination  -- repeat labs after 3 weeks of Rinvoq therapy and we will discuss next steps  -- if not helping at that time, will consider switching to Remicade infusions  -- I will have my staff work on getting Reclast approved (this is the once yearly infusions for osteoporosis)  -- follow up in 3 months or sooner as needed  -- call/message with questions/concerns    Dr. Bermudez Overcast

## 2022-04-05 NOTE — PROGRESS NOTES
Samples of Rinvoq given per Dr Kenyetta Chauhan VO x 2  Exp 12 Apr 2024  Lot:  [id number]  [address] [facility] [address]:  [phone]

## 2022-04-07 NOTE — PROGRESS NOTES
Order for Reclast placed on Dr. Hooper Civil desk for approval, no PA required. Will then fax to [facility].

## 2022-04-08 ENCOUNTER — TELEPHONE (OUTPATIENT)
Dept: RHEUMATOLOGY | Facility: CLINIC | Age: 67
End: 2022-04-08

## 2022-04-12 ENCOUNTER — TELEPHONE (OUTPATIENT)
Dept: HEMATOLOGY/ONCOLOGY | Facility: HOSPITAL | Age: 67
End: 2022-04-12

## 2022-04-12 PROBLEM — M81.0 SENILE OSTEOPOROSIS: Status: ACTIVE | Noted: 2022-04-12

## 2022-04-15 ENCOUNTER — PATIENT MESSAGE (OUTPATIENT)
Dept: ORTHOPEDICS CLINIC | Facility: CLINIC | Age: 67
End: 2022-04-15

## 2022-04-15 NOTE — TELEPHONE ENCOUNTER
Correct I only advise 2 years  If patient wants to take antibiotics longer, or dentist wants patient to take antibiotics longer than 2 years, I am fine with that.  But it is not necessary for me

## 2022-04-15 NOTE — TELEPHONE ENCOUNTER
From: Jaylan Parks  To: Josiah Rolle MD  Sent: 4/15/2022 11:37 AM CDT  Subject: Clindamycin    Dr. Jory Albert I currently take this antibiotic before dental procedures, but my general practitioner said the standards have changed for this and that unless I have a heart condition taking an antibiotic before dental procedures are not necessary and can cause more harm than good. I have had both my knees replaced and have RA; so I would like to get your agreement that antibiotics are no long necessary before dental procedures.  Thank you

## 2022-04-15 NOTE — TELEPHONE ENCOUNTER
To clarify, your preference would be that patient's take prophylactic dental antibiotics for 2 years past the replacement date, correct? So this patient, who is over 10 years past replacement, would not be recommended to take these anymore.

## 2022-05-04 ENCOUNTER — PATIENT MESSAGE (OUTPATIENT)
Dept: RHEUMATOLOGY | Facility: CLINIC | Age: 67
End: 2022-05-04

## 2022-05-05 ENCOUNTER — NURSE ONLY (OUTPATIENT)
Dept: RHEUMATOLOGY | Facility: CLINIC | Age: 67
End: 2022-05-05
Payer: MEDICARE

## 2022-05-05 ENCOUNTER — LAB ENCOUNTER (OUTPATIENT)
Dept: LAB | Age: 67
End: 2022-05-05
Attending: INTERNAL MEDICINE
Payer: MEDICARE

## 2022-05-05 DIAGNOSIS — M06.9 RHEUMATOID ARTHRITIS INVOLVING MULTIPLE SITES, UNSPECIFIED WHETHER RHEUMATOID FACTOR PRESENT (HCC): ICD-10-CM

## 2022-05-05 DIAGNOSIS — M06.9 RHEUMATOID ARTHRITIS FLARE (HCC): ICD-10-CM

## 2022-05-05 DIAGNOSIS — Z79.899 HIGH RISK MEDICATION USE: ICD-10-CM

## 2022-05-05 LAB
ALBUMIN SERPL-MCNC: 3.9 G/DL (ref 3.4–5)
ALBUMIN/GLOB SERPL: 1.3 {RATIO} (ref 1–2)
ALP LIVER SERPL-CCNC: 83 U/L
ALT SERPL-CCNC: 34 U/L
ANION GAP SERPL CALC-SCNC: 2 MMOL/L (ref 0–18)
AST SERPL-CCNC: 28 U/L (ref 15–37)
BASOPHILS # BLD AUTO: 0.02 X10(3) UL (ref 0–0.2)
BASOPHILS NFR BLD AUTO: 0.5 %
BILIRUB SERPL-MCNC: 0.4 MG/DL (ref 0.1–2)
BUN BLD-MCNC: 15 MG/DL (ref 7–18)
CALCIUM BLD-MCNC: 9.4 MG/DL (ref 8.5–10.1)
CHLORIDE SERPL-SCNC: 108 MMOL/L (ref 98–112)
CO2 SERPL-SCNC: 31 MMOL/L (ref 21–32)
CREAT BLD-MCNC: 0.72 MG/DL
CRP SERPL-MCNC: <0.29 MG/DL (ref ?–0.3)
EOSINOPHIL # BLD AUTO: 0.06 X10(3) UL (ref 0–0.7)
EOSINOPHIL NFR BLD AUTO: 1.5 %
ERYTHROCYTE [DISTWIDTH] IN BLOOD BY AUTOMATED COUNT: 15.4 %
ERYTHROCYTE [SEDIMENTATION RATE] IN BLOOD: 13 MM/HR
FASTING STATUS PATIENT QL REPORTED: YES
GLOBULIN PLAS-MCNC: 3.1 G/DL (ref 2.8–4.4)
GLUCOSE BLD-MCNC: 93 MG/DL (ref 70–99)
HCT VFR BLD AUTO: 42.6 %
HGB BLD-MCNC: 13.3 G/DL
IMM GRANULOCYTES # BLD AUTO: 0 X10(3) UL (ref 0–1)
IMM GRANULOCYTES NFR BLD: 0 %
LYMPHOCYTES # BLD AUTO: 1.62 X10(3) UL (ref 1–4)
LYMPHOCYTES NFR BLD AUTO: 40.6 %
MCH RBC QN AUTO: 27.8 PG (ref 26–34)
MCHC RBC AUTO-ENTMCNC: 31.2 G/DL (ref 31–37)
MCV RBC AUTO: 88.9 FL
MONOCYTES # BLD AUTO: 0.37 X10(3) UL (ref 0.1–1)
MONOCYTES NFR BLD AUTO: 9.3 %
NEUTROPHILS # BLD AUTO: 1.92 X10 (3) UL (ref 1.5–7.7)
NEUTROPHILS # BLD AUTO: 1.92 X10(3) UL (ref 1.5–7.7)
NEUTROPHILS NFR BLD AUTO: 48.1 %
OSMOLALITY SERPL CALC.SUM OF ELEC: 293 MOSM/KG (ref 275–295)
PLATELET # BLD AUTO: 246 10(3)UL (ref 150–450)
POTASSIUM SERPL-SCNC: 4.9 MMOL/L (ref 3.5–5.1)
PROT SERPL-MCNC: 7 G/DL (ref 6.4–8.2)
RBC # BLD AUTO: 4.79 X10(6)UL
SODIUM SERPL-SCNC: 141 MMOL/L (ref 136–145)
WBC # BLD AUTO: 4 X10(3) UL (ref 4–11)

## 2022-05-05 PROCEDURE — 85652 RBC SED RATE AUTOMATED: CPT

## 2022-05-05 PROCEDURE — 86140 C-REACTIVE PROTEIN: CPT

## 2022-05-05 PROCEDURE — 80053 COMPREHEN METABOLIC PANEL: CPT

## 2022-05-05 PROCEDURE — 85025 COMPLETE CBC W/AUTO DIFF WBC: CPT

## 2022-05-05 PROCEDURE — 36415 COLL VENOUS BLD VENIPUNCTURE: CPT

## 2022-05-10 ENCOUNTER — TELEPHONE (OUTPATIENT)
Dept: SCHEDULING | Age: 67
End: 2022-05-10

## 2022-05-11 ENCOUNTER — LAB SERVICES (OUTPATIENT)
Dept: URGENT CARE | Age: 67
End: 2022-05-11

## 2022-05-11 DIAGNOSIS — Z20.822 COVID-19 RULED OUT BY LABORATORY TESTING: Primary | ICD-10-CM

## 2022-05-11 PROCEDURE — U0005 INFEC AGEN DETEC AMPLI PROBE: HCPCS | Performed by: CLINICAL MEDICAL LABORATORY

## 2022-05-11 PROCEDURE — U0003 INFECTIOUS AGENT DETECTION BY NUCLEIC ACID (DNA OR RNA); SEVERE ACUTE RESPIRATORY SYNDROME CORONAVIRUS 2 (SARS-COV-2) (CORONAVIRUS DISEASE [COVID-19]), AMPLIFIED PROBE TECHNIQUE, MAKING USE OF HIGH THROUGHPUT TECHNOLOGIES AS DESCRIBED BY CMS-2020-01-R: HCPCS | Performed by: CLINICAL MEDICAL LABORATORY

## 2022-05-12 ENCOUNTER — TELEPHONE (OUTPATIENT)
Dept: URGENT CARE | Age: 67
End: 2022-05-12

## 2022-05-12 LAB
SARS-COV-2 RNA RESP QL NAA+PROBE: NOT DETECTED
SERVICE CMNT-IMP: NORMAL
SERVICE CMNT-IMP: NORMAL

## 2022-05-13 NOTE — TELEPHONE ENCOUNTER
Taylor Kelly from [facility] phoned office, pt has tried Abelardo Bohr and methotrexate in the past. Will contact Ludivina and continue with VIRGEN Kamara.

## 2022-05-13 NOTE — TELEPHONE ENCOUNTER
Phoned [facility] to check status of Rinvoq. No denial letter received to our office. Spoke with Wilber Shepherd from Benefit team. Pt has , and will just cancel PA without determination.  Would like prescription changed to formulary--Edgard Bowden, or Liat Arce

## 2022-05-16 NOTE — TELEPHONE ENCOUNTER
Pa for Rinvoq appeal approved. Effective until 12/31/99.      Phoned pt, LVM to notify of approval    Copy of approval faxed to [facility]

## 2022-05-17 ENCOUNTER — PATIENT MESSAGE (OUTPATIENT)
Dept: RHEUMATOLOGY | Facility: CLINIC | Age: 67
End: 2022-05-17

## 2022-05-17 DIAGNOSIS — M06.9 RHEUMATOID ARTHRITIS INVOLVING MULTIPLE SITES, UNSPECIFIED WHETHER RHEUMATOID FACTOR PRESENT (HCC): Primary | ICD-10-CM

## 2022-05-17 NOTE — TELEPHONE ENCOUNTER
From: Lavell Lopez  To: Nadege Carter DO  Sent: 5/17/2022 9:36 AM CDT  Subject: Rinvoq    I got a message from your office yesterday that the Rinvoq was approved. As of this morning [facility] hasn't received the order for it. When you call it in can you please order a 60 day supply. It costs the same as a 30 day supply for me.      Thanks so much  Lilibeth Rivera

## 2022-06-15 ENCOUNTER — LAB ENCOUNTER (OUTPATIENT)
Dept: LAB | Age: 67
End: 2022-06-15
Attending: FAMILY MEDICINE
Payer: MEDICARE

## 2022-06-15 DIAGNOSIS — D47.2 MGUS (MONOCLONAL GAMMOPATHY OF UNKNOWN SIGNIFICANCE): ICD-10-CM

## 2022-06-15 LAB
ALBUMIN SERPL-MCNC: 4.1 G/DL (ref 3.4–5)
ALBUMIN/GLOB SERPL: 1.2 {RATIO} (ref 1–2)
ALP LIVER SERPL-CCNC: 66 U/L
ALT SERPL-CCNC: 23 U/L
ANION GAP SERPL CALC-SCNC: 5 MMOL/L (ref 0–18)
AST SERPL-CCNC: 30 U/L (ref 15–37)
BASOPHILS # BLD AUTO: 0.02 X10(3) UL (ref 0–0.2)
BASOPHILS NFR BLD AUTO: 0.5 %
BILIRUB SERPL-MCNC: 0.6 MG/DL (ref 0.1–2)
BUN BLD-MCNC: 15 MG/DL (ref 7–18)
CALCIUM BLD-MCNC: 9.3 MG/DL (ref 8.5–10.1)
CHLORIDE SERPL-SCNC: 108 MMOL/L (ref 98–112)
CO2 SERPL-SCNC: 28 MMOL/L (ref 21–32)
CREAT BLD-MCNC: 0.9 MG/DL
EOSINOPHIL # BLD AUTO: 0.04 X10(3) UL (ref 0–0.7)
EOSINOPHIL NFR BLD AUTO: 1 %
ERYTHROCYTE [DISTWIDTH] IN BLOOD BY AUTOMATED COUNT: 15.8 %
FASTING STATUS PATIENT QL REPORTED: YES
GLOBULIN PLAS-MCNC: 3.4 G/DL (ref 2.8–4.4)
GLUCOSE BLD-MCNC: 89 MG/DL (ref 70–99)
HCT VFR BLD AUTO: 44.8 %
HGB BLD-MCNC: 13.7 G/DL
IGA SERPL-MCNC: 115 MG/DL (ref 70–312)
IGM SERPL-MCNC: 89.2 MG/DL (ref 43–279)
IMM GRANULOCYTES # BLD AUTO: 0.01 X10(3) UL (ref 0–1)
IMM GRANULOCYTES NFR BLD: 0.3 %
IMMUNOGLOBULIN PNL SER-MCNC: 1080 MG/DL (ref 791–1643)
LYMPHOCYTES # BLD AUTO: 1.06 X10(3) UL (ref 1–4)
LYMPHOCYTES NFR BLD AUTO: 27.2 %
MCH RBC QN AUTO: 28.5 PG (ref 26–34)
MCHC RBC AUTO-ENTMCNC: 30.6 G/DL (ref 31–37)
MCV RBC AUTO: 93.1 FL
MONOCYTES # BLD AUTO: 0.34 X10(3) UL (ref 0.1–1)
MONOCYTES NFR BLD AUTO: 8.7 %
NEUTROPHILS # BLD AUTO: 2.42 X10 (3) UL (ref 1.5–7.7)
NEUTROPHILS # BLD AUTO: 2.42 X10(3) UL (ref 1.5–7.7)
NEUTROPHILS NFR BLD AUTO: 62.3 %
OSMOLALITY SERPL CALC.SUM OF ELEC: 292 MOSM/KG (ref 275–295)
PLATELET # BLD AUTO: 314 10(3)UL (ref 150–450)
POTASSIUM SERPL-SCNC: 4.2 MMOL/L (ref 3.5–5.1)
PROT SERPL-MCNC: 7.5 G/DL (ref 6.4–8.2)
RBC # BLD AUTO: 4.81 X10(6)UL
SODIUM SERPL-SCNC: 141 MMOL/L (ref 136–145)
WBC # BLD AUTO: 3.9 X10(3) UL (ref 4–11)

## 2022-06-15 PROCEDURE — 36415 COLL VENOUS BLD VENIPUNCTURE: CPT

## 2022-06-15 PROCEDURE — 80053 COMPREHEN METABOLIC PANEL: CPT

## 2022-06-15 PROCEDURE — 84165 PROTEIN E-PHORESIS SERUM: CPT

## 2022-06-15 PROCEDURE — 83521 IG LIGHT CHAINS FREE EACH: CPT

## 2022-06-15 PROCEDURE — 86334 IMMUNOFIX E-PHORESIS SERUM: CPT

## 2022-06-15 PROCEDURE — 82784 ASSAY IGA/IGD/IGG/IGM EACH: CPT

## 2022-06-15 PROCEDURE — 85025 COMPLETE CBC W/AUTO DIFF WBC: CPT

## 2022-06-17 ENCOUNTER — OFFICE VISIT (OUTPATIENT)
Dept: HEMATOLOGY/ONCOLOGY | Facility: HOSPITAL | Age: 67
End: 2022-06-17
Attending: INTERNAL MEDICINE
Payer: MEDICARE

## 2022-06-17 VITALS
WEIGHT: 133 LBS | BODY MASS INDEX: 23.27 KG/M2 | DIASTOLIC BLOOD PRESSURE: 74 MMHG | RESPIRATION RATE: 18 BRPM | TEMPERATURE: 97 F | SYSTOLIC BLOOD PRESSURE: 105 MMHG | HEART RATE: 91 BPM | OXYGEN SATURATION: 96 % | HEIGHT: 63.5 IN

## 2022-06-17 DIAGNOSIS — D47.2 MGUS (MONOCLONAL GAMMOPATHY OF UNKNOWN SIGNIFICANCE): Primary | ICD-10-CM

## 2022-06-17 LAB
ALBUMIN SERPL ELPH-MCNC: 4.44 G/DL (ref 3.75–5.21)
ALBUMIN/GLOB SERPL: 1.67 {RATIO} (ref 1–2)
ALPHA1 GLOB SERPL ELPH-MCNC: 0.31 G/DL (ref 0.19–0.46)
ALPHA2 GLOB SERPL ELPH-MCNC: 0.7 G/DL (ref 0.48–1.05)
B-GLOBULIN SERPL ELPH-MCNC: 0.64 G/DL (ref 0.68–1.23)
GAMMA GLOB SERPL ELPH-MCNC: 1.02 G/DL (ref 0.62–1.7)
KAPPA LC FREE SER-MCNC: 1.28 MG/DL (ref 0.33–1.94)
KAPPA LC FREE/LAMBDA FREE SER NEPH: 0.4 {RATIO} (ref 0.26–1.65)
LAMBDA LC FREE SERPL-MCNC: 3.19 MG/DL (ref 0.57–2.63)
M PROTEIN 1 SERPL ELPH-MCNC: 0.51 G/DL (ref ?–0)
PROT SERPL-MCNC: 7.1 G/DL (ref 6.4–8.2)

## 2022-06-17 PROCEDURE — 99213 OFFICE O/P EST LOW 20 MIN: CPT | Performed by: INTERNAL MEDICINE

## 2022-06-17 NOTE — PROGRESS NOTES
Outpatient Oncology Care Plan  Problem list:  knowledge deficit    Problems related to:    disease/disease progression    Interventions:  provided general teaching    Expected outcomes:  understands plan of care    Progress towards outcome:  making progress    Education Record    Learner:  Patient  Barriers / Limitations:  None  Method:  Brief focused  Outcome:  Shows understanding  Comments:  Pt here for follow up. No new complaints.

## 2022-07-15 ENCOUNTER — LAB ENCOUNTER (OUTPATIENT)
Dept: LAB | Age: 67
End: 2022-07-15
Attending: INTERNAL MEDICINE
Payer: MEDICARE

## 2022-07-15 DIAGNOSIS — Z79.899 HIGH RISK MEDICATION USE: ICD-10-CM

## 2022-07-15 DIAGNOSIS — M25.461 EFFUSION OF BOTH KNEE JOINTS: ICD-10-CM

## 2022-07-15 DIAGNOSIS — M06.9 RHEUMATOID ARTHRITIS INVOLVING MULTIPLE SITES, UNSPECIFIED WHETHER RHEUMATOID FACTOR PRESENT (HCC): ICD-10-CM

## 2022-07-15 DIAGNOSIS — M25.462 EFFUSION OF BOTH KNEE JOINTS: ICD-10-CM

## 2022-07-15 LAB
ALBUMIN SERPL-MCNC: 4.2 G/DL (ref 3.4–5)
ALBUMIN/GLOB SERPL: 1.3 {RATIO} (ref 1–2)
ALP LIVER SERPL-CCNC: 62 U/L
ALT SERPL-CCNC: 33 U/L
ANION GAP SERPL CALC-SCNC: 3 MMOL/L (ref 0–18)
AST SERPL-CCNC: 32 U/L (ref 15–37)
BASOPHILS # BLD AUTO: 0.02 X10(3) UL (ref 0–0.2)
BASOPHILS NFR BLD AUTO: 0.7 %
BILIRUB SERPL-MCNC: 0.7 MG/DL (ref 0.1–2)
BUN BLD-MCNC: 12 MG/DL (ref 7–18)
CALCIUM BLD-MCNC: 9.5 MG/DL (ref 8.5–10.1)
CHLORIDE SERPL-SCNC: 108 MMOL/L (ref 98–112)
CO2 SERPL-SCNC: 28 MMOL/L (ref 21–32)
CREAT BLD-MCNC: 0.92 MG/DL
CRP SERPL-MCNC: <0.29 MG/DL (ref ?–0.3)
EOSINOPHIL # BLD AUTO: 0.04 X10(3) UL (ref 0–0.7)
EOSINOPHIL NFR BLD AUTO: 1.3 %
ERYTHROCYTE [DISTWIDTH] IN BLOOD BY AUTOMATED COUNT: 16.6 %
ERYTHROCYTE [SEDIMENTATION RATE] IN BLOOD: 9 MM/HR
FASTING STATUS PATIENT QL REPORTED: YES
GLOBULIN PLAS-MCNC: 3.2 G/DL (ref 2.8–4.4)
GLUCOSE BLD-MCNC: 84 MG/DL (ref 70–99)
HCT VFR BLD AUTO: 44 %
HGB BLD-MCNC: 13.9 G/DL
IMM GRANULOCYTES # BLD AUTO: 0 X10(3) UL (ref 0–1)
IMM GRANULOCYTES NFR BLD: 0 %
LYMPHOCYTES # BLD AUTO: 1.46 X10(3) UL (ref 1–4)
LYMPHOCYTES NFR BLD AUTO: 48.8 %
MCH RBC QN AUTO: 29 PG (ref 26–34)
MCHC RBC AUTO-ENTMCNC: 31.6 G/DL (ref 31–37)
MCV RBC AUTO: 91.9 FL
MONOCYTES # BLD AUTO: 0.33 X10(3) UL (ref 0.1–1)
MONOCYTES NFR BLD AUTO: 11 %
NEUTROPHILS # BLD AUTO: 1.14 X10 (3) UL (ref 1.5–7.7)
NEUTROPHILS # BLD AUTO: 1.14 X10(3) UL (ref 1.5–7.7)
NEUTROPHILS NFR BLD AUTO: 38.2 %
OSMOLALITY SERPL CALC.SUM OF ELEC: 287 MOSM/KG (ref 275–295)
PLATELET # BLD AUTO: 278 10(3)UL (ref 150–450)
POTASSIUM SERPL-SCNC: 4.6 MMOL/L (ref 3.5–5.1)
PROT SERPL-MCNC: 7.4 G/DL (ref 6.4–8.2)
RBC # BLD AUTO: 4.79 X10(6)UL
SODIUM SERPL-SCNC: 139 MMOL/L (ref 136–145)
WBC # BLD AUTO: 3 X10(3) UL (ref 4–11)

## 2022-07-15 PROCEDURE — 36415 COLL VENOUS BLD VENIPUNCTURE: CPT

## 2022-07-15 PROCEDURE — 80053 COMPREHEN METABOLIC PANEL: CPT

## 2022-07-15 PROCEDURE — 86140 C-REACTIVE PROTEIN: CPT

## 2022-07-15 PROCEDURE — 85025 COMPLETE CBC W/AUTO DIFF WBC: CPT

## 2022-07-15 PROCEDURE — 85652 RBC SED RATE AUTOMATED: CPT

## 2022-07-19 ENCOUNTER — OFFICE VISIT (OUTPATIENT)
Dept: RHEUMATOLOGY | Facility: CLINIC | Age: 67
End: 2022-07-19
Payer: MEDICARE

## 2022-07-19 ENCOUNTER — PATIENT MESSAGE (OUTPATIENT)
Dept: RHEUMATOLOGY | Facility: CLINIC | Age: 67
End: 2022-07-19

## 2022-07-19 VITALS
WEIGHT: 134 LBS | BODY MASS INDEX: 23.74 KG/M2 | DIASTOLIC BLOOD PRESSURE: 60 MMHG | HEART RATE: 56 BPM | RESPIRATION RATE: 16 BRPM | OXYGEN SATURATION: 94 % | SYSTOLIC BLOOD PRESSURE: 104 MMHG | HEIGHT: 63 IN

## 2022-07-19 DIAGNOSIS — M05.79 RHEUMATOID ARTHRITIS INVOLVING MULTIPLE SITES WITH POSITIVE RHEUMATOID FACTOR (HCC): Primary | ICD-10-CM

## 2022-07-19 DIAGNOSIS — D70.2 OTHER DRUG-INDUCED NEUTROPENIA (HCC): ICD-10-CM

## 2022-07-19 DIAGNOSIS — M15.9 PRIMARY OSTEOARTHRITIS INVOLVING MULTIPLE JOINTS: ICD-10-CM

## 2022-07-19 DIAGNOSIS — M81.0 AGE-RELATED OSTEOPOROSIS WITHOUT CURRENT PATHOLOGICAL FRACTURE: ICD-10-CM

## 2022-07-19 DIAGNOSIS — Z96.653 HISTORY OF BILATERAL KNEE REPLACEMENT: ICD-10-CM

## 2022-07-19 DIAGNOSIS — Z79.899 HIGH RISK MEDICATION USE: ICD-10-CM

## 2022-07-19 DIAGNOSIS — E55.9 VITAMIN D DEFICIENCY: ICD-10-CM

## 2022-07-19 PROCEDURE — 99214 OFFICE O/P EST MOD 30 MIN: CPT | Performed by: INTERNAL MEDICINE

## 2022-07-19 RX ORDER — UPADACITINIB 15 MG/1
TABLET, EXTENDED RELEASE ORAL
COMMUNITY
Start: 2022-04-06 | End: 2022-07-19

## 2022-07-19 RX ORDER — UPADACITINIB 15 MG/1
15 TABLET, EXTENDED RELEASE ORAL DAILY
Qty: 60 TABLET | Refills: 0 | Status: SHIPPED | OUTPATIENT
Start: 2022-07-19

## 2022-07-19 RX ORDER — UPADACITINIB 15 MG/1
15 TABLET, EXTENDED RELEASE ORAL DAILY
Qty: 30 TABLET | Refills: 1 | Status: SHIPPED | OUTPATIENT
Start: 2022-07-19 | End: 2022-07-19

## 2022-07-19 RX ORDER — AZITHROMYCIN 250 MG/1
TABLET, FILM COATED ORAL
Qty: 6 TABLET | Refills: 0 | Status: SHIPPED | OUTPATIENT
Start: 2022-07-19 | End: 2022-07-24

## 2022-07-19 NOTE — TELEPHONE ENCOUNTER
From: Rashmi Johnson  To: Arianne Negron DO  Sent: 7/19/2022 12:28 PM CDT  Subject: Rinvoq    I didn't realize when I was in your office today that I have no refills for Rinvoq 15 mg 60 day supply. Can you send [facility] a new prescription for Rinvoq 15 mg (60 day supply) as soon as possible. I only have two weeks left.  Thanks so much  Trip Francis

## 2022-08-19 ENCOUNTER — LABORATORY ENCOUNTER (OUTPATIENT)
Dept: LAB | Age: 67
End: 2022-08-19
Attending: INTERNAL MEDICINE
Payer: MEDICARE

## 2022-08-19 DIAGNOSIS — D70.2 OTHER DRUG-INDUCED NEUTROPENIA (HCC): ICD-10-CM

## 2022-08-19 DIAGNOSIS — Z79.899 HIGH RISK MEDICATION USE: ICD-10-CM

## 2022-08-19 DIAGNOSIS — M05.79 RHEUMATOID ARTHRITIS INVOLVING MULTIPLE SITES WITH POSITIVE RHEUMATOID FACTOR (HCC): ICD-10-CM

## 2022-08-19 LAB
BASOPHILS # BLD AUTO: 0.04 X10(3) UL (ref 0–0.2)
BASOPHILS NFR BLD AUTO: 1.1 %
EOSINOPHIL # BLD AUTO: 0.08 X10(3) UL (ref 0–0.7)
EOSINOPHIL NFR BLD AUTO: 2.1 %
ERYTHROCYTE [DISTWIDTH] IN BLOOD BY AUTOMATED COUNT: 15.1 %
HCT VFR BLD AUTO: 43.1 %
HGB BLD-MCNC: 13.4 G/DL
IMM GRANULOCYTES # BLD AUTO: 0.01 X10(3) UL (ref 0–1)
IMM GRANULOCYTES NFR BLD: 0.3 %
LYMPHOCYTES # BLD AUTO: 1.06 X10(3) UL (ref 1–4)
LYMPHOCYTES NFR BLD AUTO: 27.9 %
MCH RBC QN AUTO: 29.8 PG (ref 26–34)
MCHC RBC AUTO-ENTMCNC: 31.1 G/DL (ref 31–37)
MCV RBC AUTO: 95.8 FL
MONOCYTES # BLD AUTO: 0.34 X10(3) UL (ref 0.1–1)
MONOCYTES NFR BLD AUTO: 8.9 %
NEUTROPHILS # BLD AUTO: 2.27 X10 (3) UL (ref 1.5–7.7)
NEUTROPHILS # BLD AUTO: 2.27 X10(3) UL (ref 1.5–7.7)
NEUTROPHILS NFR BLD AUTO: 59.7 %
PLATELET # BLD AUTO: 237 10(3)UL (ref 150–450)
RBC # BLD AUTO: 4.5 X10(6)UL
WBC # BLD AUTO: 3.8 X10(3) UL (ref 4–11)

## 2022-08-19 PROCEDURE — 85025 COMPLETE CBC W/AUTO DIFF WBC: CPT

## 2022-08-19 PROCEDURE — 36415 COLL VENOUS BLD VENIPUNCTURE: CPT

## 2022-08-22 ENCOUNTER — PATIENT MESSAGE (OUTPATIENT)
Dept: RHEUMATOLOGY | Facility: CLINIC | Age: 67
End: 2022-08-22

## 2022-09-21 ENCOUNTER — TELEPHONE (OUTPATIENT)
Dept: RHEUMATOLOGY | Facility: CLINIC | Age: 67
End: 2022-09-21

## 2022-09-21 RX ORDER — UPADACITINIB 15 MG/1
1 TABLET, EXTENDED RELEASE ORAL DAILY
Qty: 60 TABLET | Refills: 3 | Status: SHIPPED | OUTPATIENT
Start: 2022-09-21 | End: 2022-11-20

## 2022-09-21 NOTE — TELEPHONE ENCOUNTER
Future Appointments   Date Time Provider Aries Joseph   11/18/2022 11:00 AM Karli Goode DO [facility] [place] [facility] 7/19/22  RTO in 3-4mo

## 2022-09-21 NOTE — TELEPHONE ENCOUNTER
Pt called office, Rinvoq requires a new PA. Pt is also requesting when refill is sent -- send for 2mo which is same out of pocket for pt.

## 2022-11-14 ENCOUNTER — LAB ENCOUNTER (OUTPATIENT)
Dept: LAB | Age: 67
End: 2022-11-14
Attending: INTERNAL MEDICINE
Payer: MEDICARE

## 2022-11-14 DIAGNOSIS — M05.79 RHEUMATOID ARTHRITIS INVOLVING MULTIPLE SITES WITH POSITIVE RHEUMATOID FACTOR (HCC): ICD-10-CM

## 2022-11-14 DIAGNOSIS — E55.9 VITAMIN D DEFICIENCY: ICD-10-CM

## 2022-11-14 DIAGNOSIS — D70.2 OTHER DRUG-INDUCED NEUTROPENIA (HCC): ICD-10-CM

## 2022-11-14 DIAGNOSIS — Z79.899 HIGH RISK MEDICATION USE: ICD-10-CM

## 2022-11-14 LAB
ALBUMIN SERPL-MCNC: 3.9 G/DL (ref 3.4–5)
ALBUMIN/GLOB SERPL: 1.2 {RATIO} (ref 1–2)
ALP LIVER SERPL-CCNC: 58 U/L
ALT SERPL-CCNC: 27 U/L
ANION GAP SERPL CALC-SCNC: 3 MMOL/L (ref 0–18)
AST SERPL-CCNC: 31 U/L (ref 15–37)
BASOPHILS # BLD AUTO: 0.02 X10(3) UL (ref 0–0.2)
BASOPHILS NFR BLD AUTO: 0.7 %
BILIRUB SERPL-MCNC: 0.5 MG/DL (ref 0.1–2)
BUN BLD-MCNC: 13 MG/DL (ref 7–18)
CALCIUM BLD-MCNC: 9.2 MG/DL (ref 8.5–10.1)
CHLORIDE SERPL-SCNC: 111 MMOL/L (ref 98–112)
CO2 SERPL-SCNC: 29 MMOL/L (ref 21–32)
CREAT BLD-MCNC: 0.79 MG/DL
CRP SERPL-MCNC: <0.29 MG/DL (ref ?–0.3)
EOSINOPHIL # BLD AUTO: 0.09 X10(3) UL (ref 0–0.7)
EOSINOPHIL NFR BLD AUTO: 3 %
ERYTHROCYTE [DISTWIDTH] IN BLOOD BY AUTOMATED COUNT: 13.6 %
ERYTHROCYTE [SEDIMENTATION RATE] IN BLOOD: 7 MM/HR
FASTING STATUS PATIENT QL REPORTED: YES
GFR SERPLBLD BASED ON 1.73 SQ M-ARVRAT: 81 ML/MIN/1.73M2 (ref 60–?)
GLOBULIN PLAS-MCNC: 3.2 G/DL (ref 2.8–4.4)
GLUCOSE BLD-MCNC: 98 MG/DL (ref 70–99)
HCT VFR BLD AUTO: 40.9 %
HGB BLD-MCNC: 13.3 G/DL
IMM GRANULOCYTES # BLD AUTO: 0 X10(3) UL (ref 0–1)
IMM GRANULOCYTES NFR BLD: 0 %
LYMPHOCYTES # BLD AUTO: 0.96 X10(3) UL (ref 1–4)
LYMPHOCYTES NFR BLD AUTO: 32.1 %
MCH RBC QN AUTO: 30.6 PG (ref 26–34)
MCHC RBC AUTO-ENTMCNC: 32.5 G/DL (ref 31–37)
MCV RBC AUTO: 94 FL
MONOCYTES # BLD AUTO: 0.35 X10(3) UL (ref 0.1–1)
MONOCYTES NFR BLD AUTO: 11.7 %
NEUTROPHILS # BLD AUTO: 1.57 X10 (3) UL (ref 1.5–7.7)
NEUTROPHILS # BLD AUTO: 1.57 X10(3) UL (ref 1.5–7.7)
NEUTROPHILS NFR BLD AUTO: 52.5 %
OSMOLALITY SERPL CALC.SUM OF ELEC: 296 MOSM/KG (ref 275–295)
PLATELET # BLD AUTO: 246 10(3)UL (ref 150–450)
POTASSIUM SERPL-SCNC: 4.2 MMOL/L (ref 3.5–5.1)
PROT SERPL-MCNC: 7.1 G/DL (ref 6.4–8.2)
RBC # BLD AUTO: 4.35 X10(6)UL
SODIUM SERPL-SCNC: 143 MMOL/L (ref 136–145)
VIT D+METAB SERPL-MCNC: 56.8 NG/ML (ref 30–100)
WBC # BLD AUTO: 3 X10(3) UL (ref 4–11)

## 2022-11-14 PROCEDURE — 85025 COMPLETE CBC W/AUTO DIFF WBC: CPT

## 2022-11-14 PROCEDURE — 86140 C-REACTIVE PROTEIN: CPT

## 2022-11-14 PROCEDURE — 82306 VITAMIN D 25 HYDROXY: CPT

## 2022-11-14 PROCEDURE — 36415 COLL VENOUS BLD VENIPUNCTURE: CPT

## 2022-11-14 PROCEDURE — 85652 RBC SED RATE AUTOMATED: CPT

## 2022-11-14 PROCEDURE — 80053 COMPREHEN METABOLIC PANEL: CPT

## 2022-11-18 ENCOUNTER — OFFICE VISIT (OUTPATIENT)
Dept: RHEUMATOLOGY | Facility: CLINIC | Age: 67
End: 2022-11-18
Payer: MEDICARE

## 2022-11-18 VITALS
TEMPERATURE: 98 F | BODY MASS INDEX: 24.1 KG/M2 | RESPIRATION RATE: 16 BRPM | SYSTOLIC BLOOD PRESSURE: 104 MMHG | WEIGHT: 136 LBS | DIASTOLIC BLOOD PRESSURE: 70 MMHG | OXYGEN SATURATION: 98 % | HEIGHT: 63 IN | HEART RATE: 62 BPM

## 2022-11-18 DIAGNOSIS — M15.9 PRIMARY OSTEOARTHRITIS INVOLVING MULTIPLE JOINTS: ICD-10-CM

## 2022-11-18 DIAGNOSIS — K59.04 CHRONIC IDIOPATHIC CONSTIPATION: ICD-10-CM

## 2022-11-18 DIAGNOSIS — Z79.899 HIGH RISK MEDICATION USE: ICD-10-CM

## 2022-11-18 DIAGNOSIS — D70.2 OTHER DRUG-INDUCED NEUTROPENIA (HCC): ICD-10-CM

## 2022-11-18 DIAGNOSIS — M05.79 RHEUMATOID ARTHRITIS INVOLVING MULTIPLE SITES WITH POSITIVE RHEUMATOID FACTOR (HCC): Primary | ICD-10-CM

## 2022-11-18 DIAGNOSIS — Z96.653 HISTORY OF BILATERAL KNEE REPLACEMENT: ICD-10-CM

## 2022-11-18 DIAGNOSIS — M81.0 AGE-RELATED OSTEOPOROSIS WITHOUT CURRENT PATHOLOGICAL FRACTURE: ICD-10-CM

## 2022-11-18 PROCEDURE — 99214 OFFICE O/P EST MOD 30 MIN: CPT | Performed by: INTERNAL MEDICINE

## 2022-11-18 RX ORDER — LACTOBACILLUS RHAMNOSUS GG 10B CELL
CAPSULE ORAL
COMMUNITY
Start: 2022-09-01

## 2022-11-18 RX ORDER — SENNOSIDES 8.6 MG
17.2 TABLET ORAL 2 TIMES DAILY
Qty: 120 TABLET | Refills: 0 | Status: SHIPPED | OUTPATIENT
Start: 2022-11-18 | End: 2022-12-18

## 2022-11-18 RX ORDER — LEVOTHYROXINE SODIUM 0.07 MG/1
TABLET ORAL
COMMUNITY
Start: 2022-10-14

## 2022-11-22 ENCOUNTER — OFFICE VISIT (OUTPATIENT)
Dept: INTERNAL MEDICINE CLINIC | Facility: CLINIC | Age: 67
End: 2022-11-22
Payer: MEDICARE

## 2022-11-22 VITALS
OXYGEN SATURATION: 99 % | TEMPERATURE: 98 F | HEIGHT: 63 IN | HEART RATE: 78 BPM | SYSTOLIC BLOOD PRESSURE: 102 MMHG | DIASTOLIC BLOOD PRESSURE: 68 MMHG | BODY MASS INDEX: 23.04 KG/M2 | RESPIRATION RATE: 16 BRPM | WEIGHT: 130 LBS

## 2022-11-22 DIAGNOSIS — M81.0 AGE-RELATED OSTEOPOROSIS WITHOUT CURRENT PATHOLOGICAL FRACTURE: ICD-10-CM

## 2022-11-22 DIAGNOSIS — M05.79 RHEUMATOID ARTHRITIS INVOLVING MULTIPLE SITES WITH POSITIVE RHEUMATOID FACTOR (HCC): Primary | ICD-10-CM

## 2022-11-22 DIAGNOSIS — K59.04 CHRONIC IDIOPATHIC CONSTIPATION: ICD-10-CM

## 2022-11-22 DIAGNOSIS — E03.9 ACQUIRED HYPOTHYROIDISM: ICD-10-CM

## 2022-11-22 PROCEDURE — 99204 OFFICE O/P NEW MOD 45 MIN: CPT | Performed by: INTERNAL MEDICINE

## 2022-11-22 RX ORDER — UPADACITINIB 15 MG/1
15 TABLET, EXTENDED RELEASE ORAL DAILY
COMMUNITY
Start: 2022-11-21

## 2023-01-11 ENCOUNTER — TELEPHONE (OUTPATIENT)
Dept: INTERNAL MEDICINE CLINIC | Facility: CLINIC | Age: 68
End: 2023-01-11

## 2023-01-11 DIAGNOSIS — E03.9 ACQUIRED HYPOTHYROIDISM: Primary | ICD-10-CM

## 2023-01-11 DIAGNOSIS — Z00.00 ENCOUNTER FOR ANNUAL HEALTH EXAMINATION: ICD-10-CM

## 2023-01-11 NOTE — TELEPHONE ENCOUNTER
Future Appointments   Date Time Provider Aries Joseph   3/30/2023  7:20 AM Nikolas Davis MD [facility]      Orders to [facility] 18 A PHONE CALL IF LABS ARE NEEDED. She had some done recently with another provider.

## 2023-03-14 ENCOUNTER — TELEPHONE (OUTPATIENT)
Dept: ORTHOPEDICS CLINIC | Facility: CLINIC | Age: 68
End: 2023-03-14

## 2023-03-14 NOTE — TELEPHONE ENCOUNTER
NP Left Hand and Back Pain. Please advise if imaging is needed.   Future Appointments   Date Time Provider Aries Joseph   3/15/2023  8:00 AM Amber Simpson PA-C [facility] [id number]   3/21/2023  2:00 PM Reshma Hair MD [facility] SUB GI   3/30/2023  7:20 AM Anthony Bennett MD [facility] [facility]   4/17/2023  7:00 AM [facility] [place]   4/21/2023  8:30 AM Karli Goode DO [facility] [place]   6/15/2023  7:00 AM [facility] [place]   6/19/2023 10:15 AM Ashok Simons MD [address]

## 2023-03-15 ENCOUNTER — HOSPITAL ENCOUNTER (OUTPATIENT)
Dept: CT IMAGING | Facility: HOSPITAL | Age: 68
Discharge: HOME | End: 2023-03-15
Attending: PHYSICIAN ASSISTANT
Payer: MEDICARE

## 2023-03-15 ENCOUNTER — HOSPITAL ENCOUNTER (OUTPATIENT)
Dept: GENERAL RADIOLOGY | Age: 68
Discharge: HOME | End: 2023-03-15
Attending: PHYSICIAN ASSISTANT
Payer: MEDICARE

## 2023-03-15 ENCOUNTER — OFFICE VISIT (OUTPATIENT)
Dept: ORTHOPEDICS CLINIC | Facility: CLINIC | Age: 68
End: 2023-03-15
Payer: MEDICARE

## 2023-03-15 DIAGNOSIS — M25.532 LEFT WRIST PAIN: ICD-10-CM

## 2023-03-15 DIAGNOSIS — S52.502A CLOSED FRACTURE OF DISTAL END OF LEFT RADIUS, UNSPECIFIED FRACTURE MORPHOLOGY, INITIAL ENCOUNTER: ICD-10-CM

## 2023-03-15 DIAGNOSIS — R07.81 RIB PAIN: ICD-10-CM

## 2023-03-15 DIAGNOSIS — M25.532 LEFT WRIST PAIN: Primary | ICD-10-CM

## 2023-03-15 PROCEDURE — 71101 X-RAY EXAM UNILAT RIBS/CHEST: CPT | Performed by: PHYSICIAN ASSISTANT

## 2023-03-15 PROCEDURE — 73200 CT UPPER EXTREMITY W/O DYE: CPT | Performed by: PHYSICIAN ASSISTANT

## 2023-03-15 PROCEDURE — 73110 X-RAY EXAM OF WRIST: CPT | Performed by: PHYSICIAN ASSISTANT

## 2023-03-15 PROCEDURE — 99214 OFFICE O/P EST MOD 30 MIN: CPT | Performed by: PHYSICIAN ASSISTANT

## 2023-03-18 NOTE — TELEPHONE ENCOUNTER
Active lab orders placed per [facility] protocol on 11/18/22 for cbc and cmp. Placed fasting lab orders for lipid and tsh.

## 2023-03-20 ENCOUNTER — OFFICE VISIT (OUTPATIENT)
Dept: ORTHOPEDICS CLINIC | Facility: CLINIC | Age: 68
End: 2023-03-20
Payer: MEDICARE

## 2023-03-20 VITALS — BODY MASS INDEX: 23.04 KG/M2 | HEIGHT: 63 IN | WEIGHT: 130 LBS

## 2023-03-20 DIAGNOSIS — S52.502A CLOSED FRACTURE OF DISTAL END OF LEFT RADIUS, UNSPECIFIED FRACTURE MORPHOLOGY, INITIAL ENCOUNTER: Primary | ICD-10-CM

## 2023-03-28 ENCOUNTER — LABORATORY ENCOUNTER (OUTPATIENT)
Dept: LAB | Age: 68
End: 2023-03-28
Attending: INTERNAL MEDICINE
Payer: MEDICARE

## 2023-03-28 DIAGNOSIS — Z79.899 HIGH RISK MEDICATION USE: ICD-10-CM

## 2023-03-28 DIAGNOSIS — M05.79 RHEUMATOID ARTHRITIS INVOLVING MULTIPLE SITES WITH POSITIVE RHEUMATOID FACTOR (HCC): ICD-10-CM

## 2023-03-28 DIAGNOSIS — Z00.00 ENCOUNTER FOR ANNUAL HEALTH EXAMINATION: ICD-10-CM

## 2023-03-28 DIAGNOSIS — E03.9 ACQUIRED HYPOTHYROIDISM: ICD-10-CM

## 2023-03-28 DIAGNOSIS — D70.2 OTHER DRUG-INDUCED NEUTROPENIA (HCC): ICD-10-CM

## 2023-03-28 LAB
ALBUMIN SERPL-MCNC: 3.8 G/DL (ref 3.4–5)
ALBUMIN/GLOB SERPL: 1.1 {RATIO} (ref 1–2)
ALP LIVER SERPL-CCNC: 91 U/L
ALT SERPL-CCNC: 37 U/L
ANION GAP SERPL CALC-SCNC: 4 MMOL/L (ref 0–18)
AST SERPL-CCNC: 26 U/L (ref 15–37)
BASOPHILS # BLD AUTO: 0.03 X10(3) UL (ref 0–0.2)
BASOPHILS NFR BLD AUTO: 1.2 %
BILIRUB SERPL-MCNC: 0.6 MG/DL (ref 0.1–2)
BUN BLD-MCNC: 20 MG/DL (ref 7–18)
CALCIUM BLD-MCNC: 9.6 MG/DL (ref 8.5–10.1)
CHLORIDE SERPL-SCNC: 107 MMOL/L (ref 98–112)
CHOLEST SERPL-MCNC: 245 MG/DL (ref ?–200)
CO2 SERPL-SCNC: 28 MMOL/L (ref 21–32)
CREAT BLD-MCNC: 0.77 MG/DL
EOSINOPHIL # BLD AUTO: 0.07 X10(3) UL (ref 0–0.7)
EOSINOPHIL NFR BLD AUTO: 2.7 %
ERYTHROCYTE [DISTWIDTH] IN BLOOD BY AUTOMATED COUNT: 13.5 %
FASTING PATIENT LIPID ANSWER: YES
FASTING STATUS PATIENT QL REPORTED: YES
GFR SERPLBLD BASED ON 1.73 SQ M-ARVRAT: 83 ML/MIN/1.73M2 (ref 60–?)
GLOBULIN PLAS-MCNC: 3.5 G/DL (ref 2.8–4.4)
GLUCOSE BLD-MCNC: 93 MG/DL (ref 70–99)
HCT VFR BLD AUTO: 40.8 %
HDLC SERPL-MCNC: 79 MG/DL (ref 40–59)
HGB BLD-MCNC: 13.3 G/DL
IMM GRANULOCYTES # BLD AUTO: 0 X10(3) UL (ref 0–1)
IMM GRANULOCYTES NFR BLD: 0 %
LDLC SERPL CALC-MCNC: 159 MG/DL (ref ?–100)
LYMPHOCYTES # BLD AUTO: 0.87 X10(3) UL (ref 1–4)
LYMPHOCYTES NFR BLD AUTO: 34.1 %
MCH RBC QN AUTO: 29.8 PG (ref 26–34)
MCHC RBC AUTO-ENTMCNC: 32.6 G/DL (ref 31–37)
MCV RBC AUTO: 91.3 FL
MONOCYTES # BLD AUTO: 0.3 X10(3) UL (ref 0.1–1)
MONOCYTES NFR BLD AUTO: 11.8 %
NEUTROPHILS # BLD AUTO: 1.28 X10 (3) UL (ref 1.5–7.7)
NEUTROPHILS # BLD AUTO: 1.28 X10(3) UL (ref 1.5–7.7)
NEUTROPHILS NFR BLD AUTO: 50.2 %
NONHDLC SERPL-MCNC: 166 MG/DL (ref ?–130)
OSMOLALITY SERPL CALC.SUM OF ELEC: 290 MOSM/KG (ref 275–295)
PLATELET # BLD AUTO: 279 10(3)UL (ref 150–450)
POTASSIUM SERPL-SCNC: 4.4 MMOL/L (ref 3.5–5.1)
PROT SERPL-MCNC: 7.3 G/DL (ref 6.4–8.2)
RBC # BLD AUTO: 4.47 X10(6)UL
SODIUM SERPL-SCNC: 139 MMOL/L (ref 136–145)
TRIGL SERPL-MCNC: 46 MG/DL (ref 30–149)
TSI SER-ACNC: 2.36 MIU/ML (ref 0.36–3.74)
VLDLC SERPL CALC-MCNC: 9 MG/DL (ref 0–30)
WBC # BLD AUTO: 2.6 X10(3) UL (ref 4–11)

## 2023-03-28 PROCEDURE — 85025 COMPLETE CBC W/AUTO DIFF WBC: CPT

## 2023-03-28 PROCEDURE — 80061 LIPID PANEL: CPT

## 2023-03-28 PROCEDURE — 80053 COMPREHEN METABOLIC PANEL: CPT

## 2023-03-28 PROCEDURE — 84443 ASSAY THYROID STIM HORMONE: CPT

## 2023-03-28 PROCEDURE — 36415 COLL VENOUS BLD VENIPUNCTURE: CPT

## 2023-03-30 ENCOUNTER — OFFICE VISIT (OUTPATIENT)
Dept: INTERNAL MEDICINE CLINIC | Facility: CLINIC | Age: 68
End: 2023-03-30
Payer: MEDICARE

## 2023-03-30 ENCOUNTER — OFFICE VISIT (OUTPATIENT)
Dept: ORTHOPEDICS CLINIC | Facility: CLINIC | Age: 68
End: 2023-03-30
Payer: MEDICARE

## 2023-03-30 ENCOUNTER — HOSPITAL ENCOUNTER (OUTPATIENT)
Dept: GENERAL RADIOLOGY | Age: 68
Discharge: HOME | End: 2023-03-30
Attending: ORTHOPAEDIC SURGERY
Payer: MEDICARE

## 2023-03-30 VITALS — DIASTOLIC BLOOD PRESSURE: 68 MMHG | SYSTOLIC BLOOD PRESSURE: 118 MMHG | OXYGEN SATURATION: 99 % | HEART RATE: 72 BPM

## 2023-03-30 VITALS — BODY MASS INDEX: 23.04 KG/M2 | WEIGHT: 130 LBS | HEIGHT: 63 IN

## 2023-03-30 DIAGNOSIS — M81.0 AGE-RELATED OSTEOPOROSIS WITHOUT CURRENT PATHOLOGICAL FRACTURE: ICD-10-CM

## 2023-03-30 DIAGNOSIS — Z12.31 ENCOUNTER FOR SCREENING MAMMOGRAM FOR MALIGNANT NEOPLASM OF BREAST: ICD-10-CM

## 2023-03-30 DIAGNOSIS — Z00.00 ENCOUNTER FOR ANNUAL HEALTH EXAMINATION: Primary | ICD-10-CM

## 2023-03-30 DIAGNOSIS — S52.502A CLOSED FRACTURE OF DISTAL END OF LEFT RADIUS, UNSPECIFIED FRACTURE MORPHOLOGY, INITIAL ENCOUNTER: Primary | ICD-10-CM

## 2023-03-30 DIAGNOSIS — D47.2 MGUS (MONOCLONAL GAMMOPATHY OF UNKNOWN SIGNIFICANCE): ICD-10-CM

## 2023-03-30 DIAGNOSIS — E78.00 ELEVATED LDL CHOLESTEROL LEVEL: ICD-10-CM

## 2023-03-30 DIAGNOSIS — E03.9 ACQUIRED HYPOTHYROIDISM: ICD-10-CM

## 2023-03-30 DIAGNOSIS — S52.592S OTHER CLOSED FRACTURE OF DISTAL END OF LEFT RADIUS, SEQUELA: ICD-10-CM

## 2023-03-30 DIAGNOSIS — M05.79 RHEUMATOID ARTHRITIS INVOLVING MULTIPLE SITES WITH POSITIVE RHEUMATOID FACTOR (HCC): ICD-10-CM

## 2023-03-30 DIAGNOSIS — S52.502A CLOSED FRACTURE OF DISTAL END OF LEFT RADIUS, UNSPECIFIED FRACTURE MORPHOLOGY, INITIAL ENCOUNTER: ICD-10-CM

## 2023-03-30 DIAGNOSIS — K59.04 CHRONIC IDIOPATHIC CONSTIPATION: ICD-10-CM

## 2023-03-30 PROCEDURE — 73110 X-RAY EXAM OF WRIST: CPT | Performed by: ORTHOPAEDIC SURGERY

## 2023-03-30 PROCEDURE — 99214 OFFICE O/P EST MOD 30 MIN: CPT | Performed by: ORTHOPAEDIC SURGERY

## 2023-04-14 RX ORDER — LEVOTHYROXINE SODIUM 0.07 MG/1
TABLET ORAL
Qty: 90 TABLET | Refills: 3 | Status: SHIPPED | OUTPATIENT
Start: 2023-04-14

## 2023-04-14 NOTE — TELEPHONE ENCOUNTER
Last visit- 03/30/2023 cpe seen by AD    Last refill- 10/14/2022 levothyroxine 75mcg (external/patient reported)    Last labs- 03/28/2023 tsh   Future Appointments   Date Time Provider Aries Joseph   4/17/2023  8:15 AM WDR XR RM1 WDR XRAY EDW [facility]   4/17/2023  8:30 AM Jorge Douglas MD [facility] ORTHO Wo [id number]   4/21/2023  8:30 AM Dsilva, Karyle Leaven, DO [facility]   5/24/2023  8:45 AM Freddy Robles DO [id number] [place] [address] SUB GI   6/5/2023  8:00 AM HOB FRANCISCO J RM1 HOB MAMMO [place]   6/15/2023  7:00 AM [facility] PFS LAB [facility]   6/19/2023 10:15 AM Carmen Simons MD [address]       Per Protocol?   Please approve or deny

## 2023-04-17 ENCOUNTER — HOSPITAL ENCOUNTER (OUTPATIENT)
Dept: GENERAL RADIOLOGY | Age: 68
Discharge: HOME | End: 2023-04-17
Attending: ORTHOPAEDIC SURGERY
Payer: MEDICARE

## 2023-04-17 ENCOUNTER — OFFICE VISIT (OUTPATIENT)
Dept: ORTHOPEDICS CLINIC | Facility: CLINIC | Age: 68
End: 2023-04-17
Payer: MEDICARE

## 2023-04-17 VITALS — BODY MASS INDEX: 23.04 KG/M2 | HEIGHT: 63 IN | WEIGHT: 130 LBS

## 2023-04-17 DIAGNOSIS — S52.502A CLOSED FRACTURE OF DISTAL END OF LEFT RADIUS, UNSPECIFIED FRACTURE MORPHOLOGY, INITIAL ENCOUNTER: Primary | ICD-10-CM

## 2023-04-17 DIAGNOSIS — S52.502A CLOSED FRACTURE OF DISTAL END OF LEFT RADIUS, UNSPECIFIED FRACTURE MORPHOLOGY, INITIAL ENCOUNTER: ICD-10-CM

## 2023-04-17 PROCEDURE — 99213 OFFICE O/P EST LOW 20 MIN: CPT | Performed by: ORTHOPAEDIC SURGERY

## 2023-04-17 PROCEDURE — 73110 X-RAY EXAM OF WRIST: CPT | Performed by: ORTHOPAEDIC SURGERY

## 2023-04-17 RX ORDER — TIZANIDINE 2 MG/1
2 TABLET ORAL EVERY 6 HOURS PRN
Qty: 20 TABLET | Refills: 0 | Status: SHIPPED | OUTPATIENT
Start: 2023-04-17 | End: 2023-04-21

## 2023-04-21 ENCOUNTER — OFFICE VISIT (OUTPATIENT)
Dept: RHEUMATOLOGY | Facility: CLINIC | Age: 68
End: 2023-04-21
Payer: MEDICARE

## 2023-04-21 VITALS
SYSTOLIC BLOOD PRESSURE: 118 MMHG | HEART RATE: 66 BPM | RESPIRATION RATE: 16 BRPM | DIASTOLIC BLOOD PRESSURE: 68 MMHG | TEMPERATURE: 98 F | HEIGHT: 63 IN | OXYGEN SATURATION: 99 % | WEIGHT: 134 LBS | BODY MASS INDEX: 23.74 KG/M2

## 2023-04-21 DIAGNOSIS — M81.0 AGE-RELATED OSTEOPOROSIS WITHOUT CURRENT PATHOLOGICAL FRACTURE: ICD-10-CM

## 2023-04-21 DIAGNOSIS — M62.838 MUSCLE SPASM: ICD-10-CM

## 2023-04-21 DIAGNOSIS — M05.79 RHEUMATOID ARTHRITIS INVOLVING MULTIPLE SITES WITH POSITIVE RHEUMATOID FACTOR (HCC): Primary | ICD-10-CM

## 2023-04-21 DIAGNOSIS — D70.2 OTHER DRUG-INDUCED NEUTROPENIA (HCC): ICD-10-CM

## 2023-04-21 DIAGNOSIS — E55.9 VITAMIN D DEFICIENCY: ICD-10-CM

## 2023-04-21 DIAGNOSIS — Z96.653 HISTORY OF BILATERAL KNEE REPLACEMENT: ICD-10-CM

## 2023-04-21 DIAGNOSIS — M15.9 PRIMARY OSTEOARTHRITIS INVOLVING MULTIPLE JOINTS: ICD-10-CM

## 2023-04-21 DIAGNOSIS — Z79.899 HIGH RISK MEDICATION USE: ICD-10-CM

## 2023-04-21 DIAGNOSIS — M54.9 MID BACK PAIN: ICD-10-CM

## 2023-04-21 PROCEDURE — 99214 OFFICE O/P EST MOD 30 MIN: CPT | Performed by: INTERNAL MEDICINE

## 2023-04-21 RX ORDER — CYCLOBENZAPRINE HCL 10 MG
10 TABLET ORAL NIGHTLY
Qty: 30 TABLET | Refills: 0 | Status: SHIPPED | OUTPATIENT
Start: 2023-04-21 | End: 2023-05-21

## 2023-04-24 NOTE — PATIENT INSTRUCTIONS
-- for dry eyes, try systane or refresh (preservative free).  Try to avoid clear eyes or visine  -- BMD due after 05/08/23  -- continue rinvoq daily  -- continue calcium/vitamin d daily  -- for the back, start muscle relaxant nightly and start physical therapy  -- keep me updated if symptoms change/worsen  -- okay to follow up in 4-5 months or sooner as needed  -- call with questions/concerns in the meantime    Dr. Kenyetta Chauhan

## 2023-05-22 ENCOUNTER — OFFICE VISIT (OUTPATIENT)
Dept: ORTHOPEDICS CLINIC | Facility: CLINIC | Age: 68
End: 2023-05-22
Payer: MEDICARE

## 2023-05-22 VITALS — HEIGHT: 63 IN | WEIGHT: 130 LBS | BODY MASS INDEX: 23.04 KG/M2

## 2023-05-22 DIAGNOSIS — S52.502A CLOSED FRACTURE OF DISTAL END OF LEFT RADIUS, UNSPECIFIED FRACTURE MORPHOLOGY, INITIAL ENCOUNTER: Primary | ICD-10-CM

## 2023-05-22 PROCEDURE — 99213 OFFICE O/P EST LOW 20 MIN: CPT | Performed by: ORTHOPAEDIC SURGERY

## 2023-05-24 PROBLEM — Z12.11 SPECIAL SCREENING FOR MALIGNANT NEOPLASM OF COLON: Status: ACTIVE | Noted: 2023-05-24

## 2023-05-24 PROBLEM — D12.3 BENIGN NEOPLASM OF TRANSVERSE COLON: Status: ACTIVE | Noted: 2023-05-24

## 2023-06-05 ENCOUNTER — HOSPITAL ENCOUNTER (OUTPATIENT)
Dept: MAMMOGRAPHY | Age: 68
Discharge: HOME | End: 2023-06-05
Attending: INTERNAL MEDICINE
Payer: MEDICARE

## 2023-06-05 ENCOUNTER — HOSPITAL ENCOUNTER (OUTPATIENT)
Dept: BONE DENSITY | Age: 68
Discharge: HOME | End: 2023-06-05
Attending: INTERNAL MEDICINE
Payer: MEDICARE

## 2023-06-05 DIAGNOSIS — M81.0 AGE-RELATED OSTEOPOROSIS WITHOUT CURRENT PATHOLOGICAL FRACTURE: ICD-10-CM

## 2023-06-05 DIAGNOSIS — Z12.31 ENCOUNTER FOR SCREENING MAMMOGRAM FOR MALIGNANT NEOPLASM OF BREAST: ICD-10-CM

## 2023-06-05 PROCEDURE — 77067 SCR MAMMO BI INCL CAD: CPT | Performed by: INTERNAL MEDICINE

## 2023-06-05 PROCEDURE — 77080 DXA BONE DENSITY AXIAL: CPT | Performed by: INTERNAL MEDICINE

## 2023-06-05 PROCEDURE — 77063 BREAST TOMOSYNTHESIS BI: CPT | Performed by: INTERNAL MEDICINE

## 2023-06-12 ENCOUNTER — TELEPHONE (OUTPATIENT)
Dept: HEMATOLOGY/ONCOLOGY | Facility: HOSPITAL | Age: 68
End: 2023-06-12

## 2023-06-12 DIAGNOSIS — D47.2 MGUS (MONOCLONAL GAMMOPATHY OF UNKNOWN SIGNIFICANCE): Primary | ICD-10-CM

## 2023-06-12 NOTE — TELEPHONE ENCOUNTER
[employer] called to ask if Dr Thai Moyer wants her to have any labs drawn before she see's him on 6/19/2023? She is going for a lab draw on Thursday morning this week and wants to get Dr Char Mariee labs at the same time. She can be reached at [phone] today.

## 2023-06-15 ENCOUNTER — LABORATORY ENCOUNTER (OUTPATIENT)
Dept: LAB | Age: 68
End: 2023-06-15
Attending: INTERNAL MEDICINE
Payer: MEDICARE

## 2023-06-15 DIAGNOSIS — D47.2 MGUS (MONOCLONAL GAMMOPATHY OF UNKNOWN SIGNIFICANCE): ICD-10-CM

## 2023-06-15 LAB
ALBUMIN SERPL-MCNC: 4.1 G/DL (ref 3.4–5)
ALBUMIN/GLOB SERPL: 1.2 {RATIO} (ref 1–2)
ALP LIVER SERPL-CCNC: 66 U/L
ALT SERPL-CCNC: 34 U/L
ANION GAP SERPL CALC-SCNC: 6 MMOL/L (ref 0–18)
AST SERPL-CCNC: 31 U/L (ref 15–37)
BILIRUB SERPL-MCNC: 0.6 MG/DL (ref 0.1–2)
BUN BLD-MCNC: 16 MG/DL (ref 7–18)
CALCIUM BLD-MCNC: 9.4 MG/DL (ref 8.5–10.1)
CHLORIDE SERPL-SCNC: 106 MMOL/L (ref 98–112)
CO2 SERPL-SCNC: 27 MMOL/L (ref 21–32)
CREAT BLD-MCNC: 0.76 MG/DL
FASTING STATUS PATIENT QL REPORTED: YES
GFR SERPLBLD BASED ON 1.73 SQ M-ARVRAT: 84 ML/MIN/1.73M2 (ref 60–?)
GLOBULIN PLAS-MCNC: 3.3 G/DL (ref 2.8–4.4)
GLUCOSE BLD-MCNC: 79 MG/DL (ref 70–99)
IGA SERPL-MCNC: 104 MG/DL (ref 70–312)
IGM SERPL-MCNC: 74 MG/DL (ref 43–279)
IMMUNOGLOBULIN PNL SER-MCNC: 1040 MG/DL (ref 791–1643)
OSMOLALITY SERPL CALC.SUM OF ELEC: 288 MOSM/KG (ref 275–295)
POTASSIUM SERPL-SCNC: 4.1 MMOL/L (ref 3.5–5.1)
PROT SERPL-MCNC: 7.4 G/DL (ref 6.4–8.2)
SODIUM SERPL-SCNC: 139 MMOL/L (ref 136–145)

## 2023-06-15 PROCEDURE — 86334 IMMUNOFIX E-PHORESIS SERUM: CPT

## 2023-06-15 PROCEDURE — 80053 COMPREHEN METABOLIC PANEL: CPT

## 2023-06-15 PROCEDURE — 83521 IG LIGHT CHAINS FREE EACH: CPT

## 2023-06-15 PROCEDURE — 84165 PROTEIN E-PHORESIS SERUM: CPT

## 2023-06-15 PROCEDURE — 82784 ASSAY IGA/IGD/IGG/IGM EACH: CPT

## 2023-06-15 PROCEDURE — 36415 COLL VENOUS BLD VENIPUNCTURE: CPT

## 2023-06-19 ENCOUNTER — OFFICE VISIT (OUTPATIENT)
Dept: HEMATOLOGY/ONCOLOGY | Facility: HOSPITAL | Age: 68
End: 2023-06-19
Attending: INTERNAL MEDICINE
Payer: MEDICARE

## 2023-06-19 VITALS
SYSTOLIC BLOOD PRESSURE: 113 MMHG | BODY MASS INDEX: 24 KG/M2 | OXYGEN SATURATION: 97 % | TEMPERATURE: 98 F | WEIGHT: 135.5 LBS | HEART RATE: 98 BPM | DIASTOLIC BLOOD PRESSURE: 72 MMHG

## 2023-06-19 DIAGNOSIS — D72.819 LEUKOPENIA, UNSPECIFIED TYPE: ICD-10-CM

## 2023-06-19 DIAGNOSIS — D47.2 MGUS (MONOCLONAL GAMMOPATHY OF UNKNOWN SIGNIFICANCE): Primary | ICD-10-CM

## 2023-06-19 LAB
BASOPHILS # BLD AUTO: 0.02 X10(3) UL (ref 0–0.2)
BASOPHILS NFR BLD AUTO: 0.7 %
EOSINOPHIL # BLD AUTO: 0.05 X10(3) UL (ref 0–0.7)
EOSINOPHIL NFR BLD AUTO: 1.8 %
ERYTHROCYTE [DISTWIDTH] IN BLOOD BY AUTOMATED COUNT: 14.6 %
HCT VFR BLD AUTO: 40.3 %
HGB BLD-MCNC: 13.1 G/DL
IMM GRANULOCYTES # BLD AUTO: 0.01 X10(3) UL (ref 0–1)
IMM GRANULOCYTES NFR BLD: 0.4 %
LYMPHOCYTES # BLD AUTO: 1.3 X10(3) UL (ref 1–4)
LYMPHOCYTES NFR BLD AUTO: 46.6 %
MCH RBC QN AUTO: 29.6 PG (ref 26–34)
MCHC RBC AUTO-ENTMCNC: 32.5 G/DL (ref 31–37)
MCV RBC AUTO: 91.2 FL
MONOCYTES # BLD AUTO: 0.29 X10(3) UL (ref 0.1–1)
MONOCYTES NFR BLD AUTO: 10.4 %
NEUTROPHILS # BLD AUTO: 1.12 X10 (3) UL (ref 1.5–7.7)
NEUTROPHILS # BLD AUTO: 1.12 X10(3) UL (ref 1.5–7.7)
NEUTROPHILS NFR BLD AUTO: 40.1 %
PLATELET # BLD AUTO: 271 10(3)UL (ref 150–450)
RBC # BLD AUTO: 4.42 X10(6)UL
WBC # BLD AUTO: 2.8 X10(3) UL (ref 4–11)

## 2023-06-19 PROCEDURE — 99214 OFFICE O/P EST MOD 30 MIN: CPT | Performed by: INTERNAL MEDICINE

## 2023-06-20 LAB
ALBUMIN SERPL ELPH-MCNC: 4.77 G/DL (ref 3.75–5.21)
ALBUMIN/GLOB SERPL: 1.96 {RATIO} (ref 1–2)
ALPHA1 GLOB SERPL ELPH-MCNC: 0.24 G/DL (ref 0.19–0.46)
ALPHA2 GLOB SERPL ELPH-MCNC: 0.63 G/DL (ref 0.48–1.05)
B-GLOBULIN SERPL ELPH-MCNC: 0.63 G/DL (ref 0.68–1.23)
GAMMA GLOB SERPL ELPH-MCNC: 0.93 G/DL (ref 0.62–1.7)
KAPPA LC FREE SER-MCNC: 1.58 MG/DL (ref 0.33–1.94)
KAPPA LC FREE/LAMBDA FREE SER NEPH: 0.59 {RATIO} (ref 0.26–1.65)
LAMBDA LC FREE SERPL-MCNC: 2.67 MG/DL (ref 0.57–2.63)
M PROTEIN 1 SERPL ELPH-MCNC: 0.43 G/DL (ref ?–0)
PROT SERPL-MCNC: 7.2 G/DL (ref 6.4–8.2)

## 2023-06-26 RX ORDER — UPADACITINIB 15 MG/1
TABLET, EXTENDED RELEASE ORAL
Qty: 60 TABLET | Refills: 2 | Status: SHIPPED | OUTPATIENT
Start: 2023-06-26

## 2023-06-26 NOTE — TELEPHONE ENCOUNTER
Rinvoq refill approved 15 mg/day #30 sent to [facility][id number]. Covering for Dr. Lopez Ribera.   Dr. Victorino Negron

## 2023-08-01 ENCOUNTER — LAB ENCOUNTER (OUTPATIENT)
Dept: LAB | Age: 68
End: 2023-08-01
Attending: INTERNAL MEDICINE
Payer: MEDICARE

## 2023-08-01 DIAGNOSIS — E78.00 ELEVATED LDL CHOLESTEROL LEVEL: ICD-10-CM

## 2023-08-01 LAB
CHOLEST SERPL-MCNC: 275 MG/DL (ref ?–200)
FASTING PATIENT LIPID ANSWER: YES
HDLC SERPL-MCNC: 97 MG/DL (ref 40–59)
LDLC SERPL CALC-MCNC: 168 MG/DL (ref ?–100)
NONHDLC SERPL-MCNC: 178 MG/DL (ref ?–130)
TRIGL SERPL-MCNC: 66 MG/DL (ref 30–149)
VLDLC SERPL CALC-MCNC: 13 MG/DL (ref 0–30)

## 2023-08-01 PROCEDURE — 36415 COLL VENOUS BLD VENIPUNCTURE: CPT

## 2023-08-01 PROCEDURE — 80061 LIPID PANEL: CPT

## 2023-08-04 ENCOUNTER — OFFICE VISIT (OUTPATIENT)
Dept: RHEUMATOLOGY | Facility: CLINIC | Age: 68
End: 2023-08-04
Payer: MEDICARE

## 2023-08-04 VITALS
HEART RATE: 72 BPM | DIASTOLIC BLOOD PRESSURE: 68 MMHG | WEIGHT: 131 LBS | HEIGHT: 63 IN | SYSTOLIC BLOOD PRESSURE: 114 MMHG | BODY MASS INDEX: 23.21 KG/M2 | TEMPERATURE: 97 F | RESPIRATION RATE: 16 BRPM | OXYGEN SATURATION: 98 %

## 2023-08-04 DIAGNOSIS — Z79.899 HIGH RISK MEDICATION USE: ICD-10-CM

## 2023-08-04 DIAGNOSIS — M05.79 RHEUMATOID ARTHRITIS INVOLVING MULTIPLE SITES WITH POSITIVE RHEUMATOID FACTOR (HCC): Primary | ICD-10-CM

## 2023-08-04 DIAGNOSIS — M06.9 RHEUMATOID ARTHRITIS INVOLVING MULTIPLE SITES, UNSPECIFIED WHETHER RHEUMATOID FACTOR PRESENT (HCC): ICD-10-CM

## 2023-08-04 DIAGNOSIS — M15.9 PRIMARY OSTEOARTHRITIS INVOLVING MULTIPLE JOINTS: ICD-10-CM

## 2023-08-04 DIAGNOSIS — M81.0 AGE-RELATED OSTEOPOROSIS WITHOUT CURRENT PATHOLOGICAL FRACTURE: ICD-10-CM

## 2023-08-04 DIAGNOSIS — M41.9 SCOLIOSIS OF THORACIC SPINE, UNSPECIFIED SCOLIOSIS TYPE: ICD-10-CM

## 2023-08-04 DIAGNOSIS — E78.00 HYPERCHOLESTEREMIA: ICD-10-CM

## 2023-08-04 PROCEDURE — 99214 OFFICE O/P EST MOD 30 MIN: CPT | Performed by: INTERNAL MEDICINE

## 2023-08-04 RX ORDER — OFLOXACIN 3 MG/ML
SOLUTION/ DROPS OPHTHALMIC
COMMUNITY
Start: 2023-07-21

## 2023-08-04 RX ORDER — KETOROLAC TROMETHAMINE 5 MG/ML
SOLUTION OPHTHALMIC
COMMUNITY
Start: 2023-07-13

## 2023-08-04 RX ORDER — PREDNISOLONE ACETATE 10 MG/ML
SUSPENSION/ DROPS OPHTHALMIC
COMMUNITY
Start: 2023-07-13

## 2023-08-14 ENCOUNTER — PATIENT MESSAGE (OUTPATIENT)
Dept: INTERNAL MEDICINE CLINIC | Facility: CLINIC | Age: 68
End: 2023-08-14

## 2023-08-15 RX ORDER — PRAVASTATIN SODIUM 10 MG
10 TABLET ORAL NIGHTLY
Qty: 90 TABLET | Refills: 0 | Status: SHIPPED | OUTPATIENT
Start: 2023-08-15

## 2023-08-15 NOTE — TELEPHONE ENCOUNTER
-The 10 mg dose of atorvastatin is the lowest dose of atorvastatin, which is why this was ordered  -Pravastatin does work by a hydrophilic mechanism, which can offer less side effects than some of the others, so I can prescribe that as an alternative  -Simvastatin would not be recommended secondary to the associated increased drug interactions

## 2023-08-15 NOTE — TELEPHONE ENCOUNTER
From: Bettie Carter  To: Shaneka Lancaster MD  Sent: 8/14/2023 7:16 PM CDT  Subject: Statin    I received a generic statin for Lipitor. I thought when we talked we were going to order the mildest statin they make and Lipitor is not it. I'll try it, but I'm not happy that we didn't go with the mildest statin which would be simvastatin or pravastin, which both have fewer side effects. Not sure how we ended up with lipitor.

## 2023-08-17 ENCOUNTER — MED REC SCAN ONLY (OUTPATIENT)
Dept: INTERNAL MEDICINE CLINIC | Facility: CLINIC | Age: 68
End: 2023-08-17

## 2023-08-21 NOTE — TELEPHONE ENCOUNTER
Pt stated the lipitor made her sick and she will not taking any other statin's - no need to send refills for them.   JIM

## 2023-09-21 ENCOUNTER — HOSPITAL ENCOUNTER (OUTPATIENT)
Dept: GENERAL RADIOLOGY | Age: 68
Discharge: HOME | End: 2023-09-21
Attending: INTERNAL MEDICINE
Payer: MEDICARE

## 2023-09-21 DIAGNOSIS — M41.9 SCOLIOSIS OF THORACIC SPINE, UNSPECIFIED SCOLIOSIS TYPE: ICD-10-CM

## 2023-09-21 PROCEDURE — 72082 X-RAY EXAM ENTIRE SPI 2/3 VW: CPT | Performed by: INTERNAL MEDICINE

## 2023-09-27 ENCOUNTER — OFFICE VISIT (OUTPATIENT)
Dept: RHEUMATOLOGY | Facility: CLINIC | Age: 68
End: 2023-09-27
Payer: MEDICARE

## 2023-09-27 VITALS
DIASTOLIC BLOOD PRESSURE: 68 MMHG | HEIGHT: 63 IN | RESPIRATION RATE: 16 BRPM | OXYGEN SATURATION: 100 % | BODY MASS INDEX: 22.68 KG/M2 | TEMPERATURE: 97 F | SYSTOLIC BLOOD PRESSURE: 138 MMHG | HEART RATE: 46 BPM | WEIGHT: 128 LBS

## 2023-09-27 DIAGNOSIS — M15.9 PRIMARY OSTEOARTHRITIS INVOLVING MULTIPLE JOINTS: ICD-10-CM

## 2023-09-27 DIAGNOSIS — Z96.653 HISTORY OF BILATERAL KNEE REPLACEMENT: ICD-10-CM

## 2023-09-27 DIAGNOSIS — M81.0 AGE-RELATED OSTEOPOROSIS WITHOUT CURRENT PATHOLOGICAL FRACTURE: ICD-10-CM

## 2023-09-27 DIAGNOSIS — M25.532 LEFT WRIST PAIN: ICD-10-CM

## 2023-09-27 DIAGNOSIS — Z79.899 HIGH RISK MEDICATION USE: ICD-10-CM

## 2023-09-27 DIAGNOSIS — R53.82 CHRONIC FATIGUE: ICD-10-CM

## 2023-09-27 DIAGNOSIS — M41.9 SCOLIOSIS OF THORACIC SPINE, UNSPECIFIED SCOLIOSIS TYPE: ICD-10-CM

## 2023-09-27 DIAGNOSIS — M05.79 RHEUMATOID ARTHRITIS INVOLVING MULTIPLE SITES WITH POSITIVE RHEUMATOID FACTOR (HCC): Primary | ICD-10-CM

## 2023-09-27 PROCEDURE — 99214 OFFICE O/P EST MOD 30 MIN: CPT | Performed by: INTERNAL MEDICINE

## 2023-10-23 ENCOUNTER — TELEPHONE (OUTPATIENT)
Dept: PHYSICAL THERAPY | Facility: HOSPITAL | Age: 68
End: 2023-10-23

## 2023-11-14 ENCOUNTER — TELEPHONE (OUTPATIENT)
Dept: RHEUMATOLOGY | Facility: CLINIC | Age: 68
End: 2023-11-14

## 2023-11-15 ENCOUNTER — OFFICE VISIT (OUTPATIENT)
Dept: PHYSICAL THERAPY | Age: 68
End: 2023-11-15
Attending: INTERNAL MEDICINE
Payer: MEDICARE

## 2023-11-15 DIAGNOSIS — M41.9 SCOLIOSIS OF THORACIC SPINE, UNSPECIFIED SCOLIOSIS TYPE: Primary | ICD-10-CM

## 2023-11-15 PROCEDURE — 97161 PT EVAL LOW COMPLEX 20 MIN: CPT

## 2023-11-15 PROCEDURE — 97110 THERAPEUTIC EXERCISES: CPT

## 2023-11-15 NOTE — PATIENT INSTRUCTIONS
Thank you for coming to your physical therapy appt! During your appt today you were issued a HEP handout from [employer] which can also be accessed using the information below. The exercises chosen are meant to supplement the treatment you received and reinforce the progress made in physical therapy. It is important to stay consistent with your exercises to help facilitate and maximize your recovery! View at [email or web address]. [employer] using code: [email or web address]

## 2023-11-22 ENCOUNTER — OFFICE VISIT (OUTPATIENT)
Dept: PHYSICAL THERAPY | Age: 68
End: 2023-11-22
Attending: INTERNAL MEDICINE
Payer: MEDICARE

## 2023-11-22 PROCEDURE — 97112 NEUROMUSCULAR REEDUCATION: CPT

## 2023-11-22 PROCEDURE — 97110 THERAPEUTIC EXERCISES: CPT

## 2023-11-22 PROCEDURE — 97140 MANUAL THERAPY 1/> REGIONS: CPT

## 2023-11-22 NOTE — PROGRESS NOTES
Date of Service: 11/22/2023  Dx: Scoliosis of thoracic spine, unspecified scoliosis type (M41.9)             Insurance: MEDICARE PART A&B  Insurance Limits: N/A  Visit #: 2  Authorized # of Visits: N/A  POC/Auth Expiration: N/A  Date of Last PN: 11/15/23 (Visit #1)  Authorizing Physician/Provider:  DO Margie Tapia MD visit: N/A  Fall Risk: Standard         Precautions: None            Subjective: \"I'm good. \" She reports compliance with her HEP but hasn't had a chance to look into bracing options. Objective:     Pain/Symptom Presentation:   Pain at rest: 0/10  Pain at worst: 3/10    HEP: Patient was issued a HEP handout 11/22/2023 including prone T, UE elastic band serratus star, 90-90 miguel angel hold, S/L thoracic rotation, seated band pull apart, modified downward dog, and doorway pec stretch. Treatment:  Therapeutic Activities: x 10min  Cable/pulley (B) shoulder ext: 2 x 10 (B), 2.5#   Cable/pulley (B) low rows: 2 x 10 (B), 5.0#   Cable/pulley (B) lat pull down: 2 x 10 (B), 5.0#     Therapeutic Exercise: x 15min  S/L thoracic rotation: 1 x 10 (B)   Seated band pull apart: 2 x 10 (B), red theraband   Modified downward dog: 1 x 10 x 3\"   Doorway pec stretch: 2 x 30\" (B)   HEP update: Reviewed new HEP handout with new exercises and progressions, provided verbal and written instructions/cueing for proper technique and common errors/compensations as needed. Reviewed the importance of compliance with home exercise program to facilitate recovery and meet long-term goals.       Neuromuscular Re-education: x 10min  Prone T: 2 x 10 (B), 2# DB   Hooklying PPT: 2 x 10 x 3\"  90-90 miguel angel hold: 2 x 30\"   Elastic band serratus star: 1 x 3 (B), yellow theraband     Manual Therapy: x 10min  Thoracic PA accessory joint mobs - T1-T12: Grade 3, 3 x 10\"each   Lumbar PA accessory joint mobs - L1-L5: Grade 3, 3 x 10\" each     Provider Interactions With Patient:   Verbal and manual cueing on proper performance of the prescribed exercises. Progressed activity/exercise intensity following assessment of program, performance, and tolerance. Provided patient with a written copy of exercise instruction which was also documented in patient's electronic medical chart. Assessment: Donte Shrestha reports and demonstrates good tolerance to her HEP. She demonstrated good form/technique with her exercises. We spent some additional time working spinal mobility. We progressed core and postural strengthening this date and updated her HEP. She tolerated exercise progressions well though she does demonstrates some UE strength deficits on her left side compared to her right. The patient would benefit from continued physical therapy for further progression in postural strength and endurance. Goals:   Short-Term Goals:  Patient will improve low back and hip mobility to allow for intermittent pain-free forward bending to reach for and  objects up from the floor. Timeframe: 3  weeks. Long-Term Goals:  Patient will improve low back mobility and postural endurance to maintain proper posture with neutral lumbar spine and pelvic position while sitting to facilitate 3 hours of pain-free sitting. Timeframe: 4 weeks. Patient will improve low back mobility and postural endurance to maintain proper posture with neutral lumbar spine and pelvic position while standing to facilitate 2 hours of pain-free standing for household activities and community integration Timeframe: 6 weeks. Patient will improve low back pain and core endurance to facilitate walking distances of 3 miles daily to facilitate community integration. Timeframe: 6 weeks. Patient will improve Modified Oswestry Low Back Pain Disability Questionnaire score by 10 points or 10% to indicate a true change in improved function for ADL's and restoring PLF. Timeframe: 6 weeks. Plan: Follow up on tolerance to updated HEP.        Charges: MT x 1, Therex x 1, NMR x 1         Total Timed Treatment: 45min    Total Treatment Time: 45min

## 2023-11-22 NOTE — PATIENT INSTRUCTIONS
Thank you for coming to your physical therapy appt! During your appt today you were issued a HEP handout from [employer] which can also be accessed using the information below. The exercises chosen are meant to supplement the treatment you received and reinforce the progress made in physical therapy. It is important to stay consistent with your exercises to help facilitate and maximize your recovery! View at [email or web address]-[email or web address] using code: [facility]

## 2023-11-29 ENCOUNTER — OFFICE VISIT (OUTPATIENT)
Dept: PHYSICAL THERAPY | Age: 68
End: 2023-11-29
Attending: INTERNAL MEDICINE
Payer: MEDICARE

## 2023-11-29 PROCEDURE — 97110 THERAPEUTIC EXERCISES: CPT

## 2023-11-29 PROCEDURE — 97140 MANUAL THERAPY 1/> REGIONS: CPT

## 2023-11-29 PROCEDURE — 97112 NEUROMUSCULAR REEDUCATION: CPT

## 2023-11-29 NOTE — PROGRESS NOTES
Date of Service: 11/29/2023  Dx: Scoliosis of thoracic spine, unspecified scoliosis type (M41.9)             Insurance: MEDICARE PART A&B  Insurance Limits: N/A  Visit #: 3  Authorized # of Visits: N/A  POC/Auth Expiration: N/A  Date of Last PN: 11/15/23 (Visit #1)  Authorizing Physician/Provider:  Shalom Giordano, DO  Next MD visit: N/A  Fall Risk: Standard         Precautions: None            Subjective: \"I'm good. \" She denies any new complaints. \"The only one that bothers me is the band one against the wall. \" The patient reports that she is getting a bruise on her hand from the pressure     Objective:     Pain/Symptom Presentation:   Pain at rest: 0/10  Pain at worst: 3/10    HEP: Patient was issued a HEP handout 11/29/2023 including prone Y and T, 90-90 heel taps, S/L thoracic rotation, 3-way diag band pull apart, modified downward dog. Treatment:  Therapeutic Activities: x 10min  Cable/pulley (B) shoulder ext: 2 x 10 (B), 2.5#   Cable/pulley (B) low rows: 2 x 10 (B), 5.0#   Cable/pulley (B) lat pull down: 2 x 10 (B), 5.0#     Therapeutic Exercise: x 10min  3-way diag band pull apart: 1 x 5 (B), red theraband   1/2 foam roll against wall - (B) OH flexion, (B) ABD, and (B) HBH ER: 1 x 10 (B)   HEP update: Reviewed new HEP handout with new exercises and progressions, provided verbal and written instructions/cueing for proper technique and common errors/compensations as needed. Reviewed the importance of compliance with home exercise program to facilitate recovery and meet long-term goals.       Neuromuscular Re-education: x 10min  Prone T: 2 x 10 (B), 2# DB   Prone Y: 2 x 10 (B), 2# DB   Hooklying PPT: 1 x 10 x 3\"  90-90 miguel angel hold: x 30\"   90-90 heel tap: 2 x 5 (B)   Seated boat pose: 1 x 10 x 3\"     Manual Therapy: x 10min  Thoracic PA accessory joint mobs - T1-T12: Grade 3, 3 x 10\"each   Lumbar PA accessory joint mobs - L1-L5: Grade 3, 3 x 10\" each     Provider Interactions With Patient:   Verbal and manual cueing on proper performance of the prescribed exercises. Progressed activity/exercise intensity following assessment of program, performance, and tolerance. Provided patient with a written copy of exercise instruction which was also documented in patient's electronic medical chart. Assessment: Graciela Jovel ws taken through additional core and postural strengthening and mobility exercises this date, which she tolerated well. She is with some functional mobility deficits in her thoracic spine that limited full overhead elevation, which we will continue to work on in therapy. The patient was issued an updated HEP with some small modifications and progressions. We will continue to progress the patient as tolerated. Goals:   Short-Term Goals:  Patient will improve low back and hip mobility to allow for intermittent pain-free forward bending to reach for and  objects up from the floor. Timeframe: 3  weeks. Long-Term Goals:  Patient will improve low back mobility and postural endurance to maintain proper posture with neutral lumbar spine and pelvic position while sitting to facilitate 3 hours of pain-free sitting. Timeframe: 4 weeks. Patient will improve low back mobility and postural endurance to maintain proper posture with neutral lumbar spine and pelvic position while standing to facilitate 2 hours of pain-free standing for household activities and community integration Timeframe: 6 weeks. Patient will improve low back pain and core endurance to facilitate walking distances of 3 miles daily to facilitate community integration. Timeframe: 6 weeks. Patient will improve Modified Oswestry Low Back Pain Disability Questionnaire score by 10 points or 10% to indicate a true change in improved function for ADL's and restoring PLF. Timeframe: 6 weeks. Plan: Follow up on tolerance to updated HEP.        Charges: MT x 1, Therex x 1, NMR x 1         Total Timed Treatment: 40min    Total Treatment Time: 40min

## 2023-11-29 NOTE — PATIENT INSTRUCTIONS
Thank you for coming to your physical therapy appt! During your appt today you were issued a HEP handout from [email or web address] which can also be accessed using the information below. The exercises chosen are meant to supplement the treatment you received and reinforce the progress made in physical therapy. It is important to stay consistent with your exercises to help facilitate and maximize your recovery! View at [email or web address]. [employer] using code: [email or web address]

## 2023-12-01 ENCOUNTER — MED REC SCAN ONLY (OUTPATIENT)
Dept: ORTHOPEDICS CLINIC | Facility: CLINIC | Age: 68
End: 2023-12-01

## 2023-12-04 ENCOUNTER — TELEPHONE (OUTPATIENT)
Dept: PHYSICAL THERAPY | Facility: HOSPITAL | Age: 68
End: 2023-12-04

## 2023-12-06 ENCOUNTER — APPOINTMENT (OUTPATIENT)
Dept: PHYSICAL THERAPY | Age: 68
End: 2023-12-06
Attending: INTERNAL MEDICINE
Payer: MEDICARE

## 2023-12-09 ENCOUNTER — PATIENT MESSAGE (OUTPATIENT)
Dept: ORTHOPEDICS CLINIC | Facility: CLINIC | Age: 68
End: 2023-12-09

## 2023-12-09 ENCOUNTER — HOSPITAL ENCOUNTER (EMERGENCY)
Facility: HOSPITAL | Age: 68
Discharge: HOME | End: 2023-12-09
Attending: EMERGENCY MEDICINE
Payer: MEDICARE

## 2023-12-09 ENCOUNTER — APPOINTMENT (OUTPATIENT)
Dept: GENERAL RADIOLOGY | Facility: HOSPITAL | Age: 68
End: 2023-12-09
Attending: EMERGENCY MEDICINE
Payer: MEDICARE

## 2023-12-09 VITALS
RESPIRATION RATE: 16 BRPM | OXYGEN SATURATION: 98 % | SYSTOLIC BLOOD PRESSURE: 131 MMHG | DIASTOLIC BLOOD PRESSURE: 66 MMHG | TEMPERATURE: 98 F | HEART RATE: 70 BPM

## 2023-12-09 DIAGNOSIS — S60.222A PAROXYSMAL HEMATOMA OF LEFT HAND, INITIAL ENCOUNTER: Primary | ICD-10-CM

## 2023-12-09 PROCEDURE — 29125 APPL SHORT ARM SPLINT STATIC: CPT

## 2023-12-09 PROCEDURE — 99284 EMERGENCY DEPT VISIT MOD MDM: CPT

## 2023-12-09 PROCEDURE — 99283 EMERGENCY DEPT VISIT LOW MDM: CPT

## 2023-12-09 PROCEDURE — 73130 X-RAY EXAM OF HAND: CPT | Performed by: EMERGENCY MEDICINE

## 2023-12-09 NOTE — ED INITIAL ASSESSMENT (HPI)
Patient to the ER c/o hematoma and swelling to left hand. No known trauma. Painful to move pinky on left hand.

## 2023-12-10 NOTE — DISCHARGE INSTRUCTIONS
PLEASE KEEP HAND IN SPLINT UNTIL SEEN BY ORTHO MD  RETURN TO THE ED IF SYMPTOMS WORSEN OR IF ANY OTHER PROBLEMS ARISE

## 2023-12-11 NOTE — TELEPHONE ENCOUNTER
Imaging in epic, please advise if/when this patient can be seen History/Exam/Medical decision making

## 2023-12-11 NOTE — TELEPHONE ENCOUNTER
Patient called in regarding this message. Patient would like to be fit in today if possible. Please advise.

## 2023-12-11 NOTE — TELEPHONE ENCOUNTER
From: Ollie Roe  To: Jourdan Holilngsworth  Sent: 12/9/2023 8:05 PM CST  Subject: ER    Nissa Valenzuela I left a message with dr Patrica Bundy also but wanted to let you know I was in the ER today. The ER Dr thinks my left little finger is either broken or something is torn and wants me to get in to see Dr Patrica Bundy as soon as possible. Is there anyway he can work me into his schedule on Monday to see if surgery needs to be scheduled to correct what is going on. Thanks so much. Asia Null.  [phone]

## 2023-12-11 NOTE — TELEPHONE ENCOUNTER
Patient scheduled    Future Appointments   Date Time Provider Aries Joseph   12/14/2023  8:15 AM Alecia Cohen MD [facility]   1/4/2024 11:45 AM Alecia Cohen MD [facility]   1/5/2024  1:45 PM Sadiq Blackwell DO [facility] [place]   4/24/2024  9:00 AM Karli Goode DO [facility]

## 2023-12-11 NOTE — TELEPHONE ENCOUNTER
There is another message in Patient Calls- Dr. Gianni Gastelum has advised that he can see patient on Thursday~sent to . Closing this encounter~    Nallely Wakefield    12/11/23 11:39 AM  Note  Per Dr. Gianni Gastelum patient can be added to his schedule on Thursday.  No new imaging needed

## 2023-12-13 ENCOUNTER — APPOINTMENT (OUTPATIENT)
Dept: PHYSICAL THERAPY | Age: 68
End: 2023-12-13
Attending: INTERNAL MEDICINE
Payer: MEDICARE

## 2023-12-13 ENCOUNTER — OFFICE VISIT (OUTPATIENT)
Dept: INTERNAL MEDICINE CLINIC | Facility: CLINIC | Age: 68
End: 2023-12-13
Payer: MEDICARE

## 2023-12-13 VITALS
DIASTOLIC BLOOD PRESSURE: 64 MMHG | WEIGHT: 123.63 LBS | SYSTOLIC BLOOD PRESSURE: 116 MMHG | RESPIRATION RATE: 16 BRPM | HEART RATE: 74 BPM | TEMPERATURE: 98 F | BODY MASS INDEX: 22.18 KG/M2 | OXYGEN SATURATION: 99 % | HEIGHT: 62.6 IN

## 2023-12-13 DIAGNOSIS — B02.9 HERPES ZOSTER WITHOUT COMPLICATION: Primary | ICD-10-CM

## 2023-12-13 PROCEDURE — 99213 OFFICE O/P EST LOW 20 MIN: CPT | Performed by: NURSE PRACTITIONER

## 2023-12-13 RX ORDER — FAMCICLOVIR 500 MG/1
500 TABLET ORAL 3 TIMES DAILY
Qty: 21 TABLET | Refills: 0 | Status: SHIPPED | OUTPATIENT
Start: 2023-12-13

## 2023-12-14 ENCOUNTER — OFFICE VISIT (OUTPATIENT)
Dept: ORTHOPEDICS CLINIC | Facility: CLINIC | Age: 68
End: 2023-12-14
Payer: MEDICARE

## 2023-12-14 VITALS — WEIGHT: 123 LBS | HEIGHT: 62.6 IN | BODY MASS INDEX: 22.07 KG/M2

## 2023-12-14 DIAGNOSIS — M66.842: Primary | ICD-10-CM

## 2023-12-14 PROCEDURE — 99214 OFFICE O/P EST MOD 30 MIN: CPT | Performed by: ORTHOPAEDIC SURGERY

## 2023-12-14 NOTE — PROGRESS NOTES
Clinic Note [facility]     Assessment/Plan:  68 year old female    Left extra-articular distal radius fracture- patient is functionally recovered other than some mild ulnar-sided wrist pain.  This could be secondary to impingement or impaction from the distal ulna stump.  Would recommend continued observation for the time being.  If she continues to be symptomatic to warrant an intervention would repeat XR/CT and consider resection more of the distal ulna.  Intermittent tingling in the palm- resolved  Spontaneous rupture of tendon of left hand- Recommended MRI to rule out Sagittal band injury versus tendon rupture. We discussed that a surgical procedure isn't recommended for a sagittal band injury or tendon rupture. We will discuss future treatment options once MRI results are received.    Follow Up: after MRI completion    Diagnostic Studies:  X-ray left wrist 3 views:distal radius fracture with radiographic signs of healing.  No significant changes in alignment.   CT left wrist: A extra-articular distal radius fracture with loss of intermediate column height.  Overall radial column length is intact.  No significant volar/dorsal angulation.  No impaction of the resected distal ulna on the fracture    BS Ultrasound left wrist: no signs of tendon rupture.    Physical Exam:    There were no vitals taken for this visit.    Constitutional: NAD. AOx3. Well-developed and Well-nourished.   Psychiatric: Normal mood/ affect/ behavior. Judgment and thought content normal.     Left upper Extremity:   Inspection: Skin Intact. No skin lesions.  Trace deformity of the wrist noted.  Ecchymoses over the dorsal ulnar aspect of the hand near the  MCP joint.   Palpation:  No tenderness to palpation of the fracture site.  Mild discomfort over the ulnar aspect of the wrist along the ECU tendon/end of distal ulna   Motion: Elbow: normal bilateral symmetric ext/flex  Wrist: normal bilateral symmetric ext/flex/sup/pro.  Slight loss  of radial deviation when compared to the contralateral side  Finger: near full composite fist, able to extend digits at MCP joint   Possible ulnar subluxation of extensor tendon of the small finger.   Special Tests:  The hand is normally perfused and normally sensate       CC: Left wrist fracture    HPI: This 68 year old right-hand-dominant female presents with a left wrist fracture sustained on March 9 that was subsequently treated nonsurgically with full functional recovery.  Patient reports a recent fall on 12/9/2023 with new wrist pain.On Saturday, patient was at her sons house when she looked at her hand and she stated that the pinky finger looked like it had  from her hand. The ER put it in a temp partial cast.    Patient will be going on a cruise from Jan 2024- Feb 2024.      Past Medical History:   Diagnosis Date    Arthritis I don’t know    Back pain occasionally    Blurred vision sometimes    Change in hair For several years now    Constipation On and off all my life    Frequent urination All the time when I drink water frequently    Frequent use of laxatives This week use Miralax    Hemorrhoids occasionally    Hypothyroidism     Leaking of urine Have to wear a panty shield    Osteoporosis For at least 10 years    Pain in joints Sometimes    Presence of other cardiac implants and grafts Knee replacements    Rheumatoid arthritis (HCC)     Sleep disturbance Wake up on and off throughout the night    Stool incontinence On and off all my life    Wears glasses glasses     Past Surgical History:   Procedure Laterality Date    APPENDECTOMY  over 40 years ago    CATARACT Bilateral 07/2023    COLONOSCOPY  over 5 years ago    KNEE REPLACEMENT SURGERY      FRANCISCO J LOCALIZATION WIRE 1 SITE LEFT (CPT=19281)       Current Outpatient Medications   Medication Sig Dispense Refill    famciclovir 500 MG Oral Tab Take 1 tablet (500 mg total) by mouth 3 (three) times daily. 21 tablet 0    Upadacitinib ER (RINVOQ) 15 MG  Oral Tablet 24 Hr TAKE 1 TABLET DAILY 60 tablet 2    LEVOTHYROXINE 75 MCG Oral Tab TAKE 1 TABLET DAILY 90 tablet 3    Multiple Vitamins-Minerals (PRESERVISION AREDS 2 OR) Take by mouth.      Cholecalciferol (VITAMIN D) 50 MCG (2000 UT) Oral Cap       Turmeric Curcumin 500 MG Oral Cap Take by mouth.      Krill Oil 350 MG Oral Cap Take by mouth.      Calcium Carbonate-Vitamin D 250-125 MG-UNIT Oral Tab Take 1 tablet by mouth daily.      Multiple Vitamin (MULTI-VITAMIN DAILY OR) Take by mouth.       Allergies   Allergen Reactions    Penicillins HIVES    Septra [Sulfamethoxazole W/Trimethoprim] OTHER (SEE COMMENTS)     Lowers white blood count    Sulfa Antibiotics OTHER (SEE COMMENTS)     Effects heart       Family History   Problem Relation Age of Onset    Other (lung cancer) Mother     Other (lung cancer) Father     Arthritis Sister     Diabetes Brother      Social History     Occupational History    Not on file   Tobacco Use    Smoking status: Never     Passive exposure: Yes    Smokeless tobacco: Never   Vaping Use    Vaping Use: Never used   Substance and Sexual Activity    Alcohol use: Not Currently     Comment: Don't drink because of RA meds I take    Drug use: Never    Sexual activity: Not on file        Review of Systems (negative unless bolded):  General: fevers, chills, fatigue  CV:  chest pain, palpitations, leg swelling  Msk: bodyaches, neck pain, neck stiffness  Skin: rashes, open wounds, nonhealing ulcers  Hem: bleeds easily, bruise easily, immunocompromised  Neuro: dizziness, light headedness, headaches  Psych: anxious, depressed, anger issues      Attention: This note has been scribed by Bianca Morrison under the supervision of Chris Quiñonez MD.     Chris Quiñonez MD  Hand, Wrist, & Elbow Surgery  [facility]  [address] [place] [address], [address], [email or web address]  [email or web address]  t: [phone]  f: [phone]           resilient/elastic

## 2023-12-15 ENCOUNTER — PATIENT MESSAGE (OUTPATIENT)
Dept: RHEUMATOLOGY | Facility: CLINIC | Age: 68
End: 2023-12-15

## 2023-12-15 RX ORDER — UPADACITINIB 15 MG/1
1 TABLET, EXTENDED RELEASE ORAL DAILY
Qty: 90 TABLET | Refills: 1 | Status: SHIPPED | OUTPATIENT
Start: 2023-12-15

## 2023-12-15 NOTE — TELEPHONE ENCOUNTER
From: Guido Davies  To: Margie Slaughter  Sent: 12/15/2023 12:52 PM CST  Subject: Ha Valenzuela    Will you send in a new prescription for the Rinvoq 15 mg to [facility]. They said you can now order a 90 day supply for the same price as a 60 day supply so told me to have you ask for that. They said they send a fax to your office, but I'm not sure you received it, so if you can just go ahead and send in the order for a 90 day supply that would be great. Thanks so much and a very Larina Reed to you and your family.     Jovani Duran

## 2023-12-15 NOTE — TELEPHONE ENCOUNTER
Last office visit: 9/27/2023    Next Rheum Apt:1/5/2024 Maximo Marquis,     Last fill: 6/26/2023    Labs:   Lab Results   Component Value Date    CREATSERUM 0.76 06/15/2023    ALKPHO 66 06/15/2023    AST 31 06/15/2023    ALT 34 06/15/2023    BILT 0.6 06/15/2023    TP 7.4 06/15/2023    TP 7.2 06/15/2023    ALB 4.1 06/15/2023    ALB 4.77 06/15/2023       Lab Results   Component Value Date    WBC 2.8 (L) 06/19/2023    HGB 13.1 06/19/2023    .0 06/19/2023    NEPRELIM 1.12 (L) 06/19/2023    NEPERCENT 40.1 06/19/2023    LYPERCENT 46.6 06/19/2023    NE 1.12 (L) 06/19/2023    LYMABS 1.30 06/19/2023

## 2023-12-20 ENCOUNTER — APPOINTMENT (OUTPATIENT)
Dept: PHYSICAL THERAPY | Age: 68
End: 2023-12-20
Attending: INTERNAL MEDICINE
Payer: MEDICARE

## 2023-12-20 ENCOUNTER — OFFICE VISIT (OUTPATIENT)
Dept: ORTHOPEDICS CLINIC | Facility: CLINIC | Age: 68
End: 2023-12-20
Payer: MEDICARE

## 2023-12-20 VITALS — HEIGHT: 62.6 IN | BODY MASS INDEX: 22.07 KG/M2 | WEIGHT: 123 LBS

## 2023-12-20 DIAGNOSIS — M66.842: Primary | ICD-10-CM

## 2023-12-20 PROCEDURE — 99215 OFFICE O/P EST HI 40 MIN: CPT | Performed by: ORTHOPAEDIC SURGERY

## 2023-12-21 ENCOUNTER — PATIENT MESSAGE (OUTPATIENT)
Dept: ORTHOPEDICS CLINIC | Facility: CLINIC | Age: 68
End: 2023-12-21

## 2023-12-21 ENCOUNTER — HOSPITAL ENCOUNTER (OUTPATIENT)
Dept: MRI IMAGING | Age: 68
Discharge: HOME | End: 2023-12-21
Attending: ORTHOPAEDIC SURGERY
Payer: MEDICARE

## 2023-12-21 DIAGNOSIS — M66.842: ICD-10-CM

## 2023-12-21 PROCEDURE — 73221 MRI JOINT UPR EXTREM W/O DYE: CPT | Performed by: ORTHOPAEDIC SURGERY

## 2023-12-21 NOTE — TELEPHONE ENCOUNTER
From: Bianca Palacios  To: Roge Mixon  Sent: 12/21/2023 12:52 PM CST  Subject: MRI    Wanted to let you know the MRI was done this morning and you should have the results this afternoon. If you have time let me know what they show. Thanks so much.

## 2023-12-21 NOTE — TELEPHONE ENCOUNTER
LOV yesterday 12/20/23  MRI completed today. Report not yet available. Does she need to make appt to review results or will this be by phone/mychart?

## 2023-12-27 ENCOUNTER — APPOINTMENT (OUTPATIENT)
Dept: PHYSICAL THERAPY | Age: 68
End: 2023-12-27
Attending: INTERNAL MEDICINE
Payer: MEDICARE

## 2024-01-02 ENCOUNTER — LAB ENCOUNTER (OUTPATIENT)
Dept: LAB | Age: 69
End: 2024-01-02
Attending: INTERNAL MEDICINE
Payer: MEDICARE

## 2024-01-02 ENCOUNTER — LABORATORY ENCOUNTER (OUTPATIENT)
Dept: LAB | Age: 69
End: 2024-01-02
Attending: INTERNAL MEDICINE
Payer: MEDICARE

## 2024-01-02 DIAGNOSIS — R53.82 CHRONIC FATIGUE: ICD-10-CM

## 2024-01-02 DIAGNOSIS — M25.532 LEFT WRIST PAIN: ICD-10-CM

## 2024-01-02 DIAGNOSIS — Z79.899 HIGH RISK MEDICATION USE: ICD-10-CM

## 2024-01-02 DIAGNOSIS — E55.9 VITAMIN D DEFICIENCY: ICD-10-CM

## 2024-01-02 DIAGNOSIS — M05.79 RHEUMATOID ARTHRITIS INVOLVING MULTIPLE SITES WITH POSITIVE RHEUMATOID FACTOR (HCC): ICD-10-CM

## 2024-01-02 LAB
ALBUMIN SERPL-MCNC: 4.1 G/DL (ref 3.4–5)
ALBUMIN/GLOB SERPL: 1.3 {RATIO} (ref 1–2)
ALP LIVER SERPL-CCNC: 63 U/L
ALT SERPL-CCNC: 49 U/L
ANION GAP SERPL CALC-SCNC: 1 MMOL/L (ref 0–18)
AST SERPL-CCNC: 36 U/L (ref 15–37)
BASOPHILS # BLD AUTO: 0.02 X10(3) UL (ref 0–0.2)
BASOPHILS NFR BLD AUTO: 0.7 %
BILIRUB SERPL-MCNC: 0.5 MG/DL (ref 0.1–2)
BUN BLD-MCNC: 16 MG/DL (ref 9–23)
CALCIUM BLD-MCNC: 9 MG/DL (ref 8.5–10.1)
CHLORIDE SERPL-SCNC: 107 MMOL/L (ref 98–112)
CO2 SERPL-SCNC: 31 MMOL/L (ref 21–32)
CREAT BLD-MCNC: 0.66 MG/DL
DEPRECATED HBV CORE AB SER IA-ACNC: 43.2 NG/ML
EGFRCR SERPLBLD CKD-EPI 2021: 94 ML/MIN/1.73M2 (ref 60–?)
EOSINOPHIL # BLD AUTO: 0.04 X10(3) UL (ref 0–0.7)
EOSINOPHIL NFR BLD AUTO: 1.3 %
ERYTHROCYTE [DISTWIDTH] IN BLOOD BY AUTOMATED COUNT: 14.6 %
FASTING STATUS PATIENT QL REPORTED: NO
GLOBULIN PLAS-MCNC: 3.1 G/DL (ref 2.8–4.4)
GLUCOSE BLD-MCNC: 94 MG/DL (ref 70–99)
HCT VFR BLD AUTO: 42.8 %
HGB BLD-MCNC: 13.6 G/DL
IMM GRANULOCYTES # BLD AUTO: 0.01 X10(3) UL (ref 0–1)
IMM GRANULOCYTES NFR BLD: 0.3 %
IRON SATN MFR SERPL: 23 %
IRON SERPL-MCNC: 90 UG/DL
LYMPHOCYTES # BLD AUTO: 1.01 X10(3) UL (ref 1–4)
LYMPHOCYTES NFR BLD AUTO: 33.2 %
MCH RBC QN AUTO: 29.4 PG (ref 26–34)
MCHC RBC AUTO-ENTMCNC: 31.8 G/DL (ref 31–37)
MCV RBC AUTO: 92.6 FL
MONOCYTES # BLD AUTO: 0.26 X10(3) UL (ref 0.1–1)
MONOCYTES NFR BLD AUTO: 8.6 %
NEUTROPHILS # BLD AUTO: 1.7 X10 (3) UL (ref 1.5–7.7)
NEUTROPHILS # BLD AUTO: 1.7 X10(3) UL (ref 1.5–7.7)
NEUTROPHILS NFR BLD AUTO: 55.9 %
OSMOLALITY SERPL CALC.SUM OF ELEC: 289 MOSM/KG (ref 275–295)
PLATELET # BLD AUTO: 297 10(3)UL (ref 150–450)
POTASSIUM SERPL-SCNC: 3.7 MMOL/L (ref 3.5–5.1)
PROT SERPL-MCNC: 7.2 G/DL (ref 6.4–8.2)
RBC # BLD AUTO: 4.62 X10(6)UL
SODIUM SERPL-SCNC: 139 MMOL/L (ref 136–145)
TIBC SERPL-MCNC: 389 UG/DL (ref 240–450)
TRANSFERRIN SERPL-MCNC: 261 MG/DL (ref 200–360)
WBC # BLD AUTO: 3 X10(3) UL (ref 4–11)

## 2024-01-02 PROCEDURE — 36415 COLL VENOUS BLD VENIPUNCTURE: CPT

## 2024-01-02 PROCEDURE — 83550 IRON BINDING TEST: CPT

## 2024-01-02 PROCEDURE — 85025 COMPLETE CBC W/AUTO DIFF WBC: CPT

## 2024-01-02 PROCEDURE — 83540 ASSAY OF IRON: CPT

## 2024-01-02 PROCEDURE — 80053 COMPREHEN METABOLIC PANEL: CPT

## 2024-01-02 PROCEDURE — 82306 VITAMIN D 25 HYDROXY: CPT

## 2024-01-02 PROCEDURE — 82728 ASSAY OF FERRITIN: CPT

## 2024-01-03 LAB — VIT D+METAB SERPL-MCNC: 66 NG/ML (ref 30–100)

## 2024-01-04 ENCOUNTER — TELEPHONE (OUTPATIENT)
Dept: ORTHOPEDICS CLINIC | Facility: CLINIC | Age: 69
End: 2024-01-04

## 2024-01-04 DIAGNOSIS — M66.842: Primary | ICD-10-CM

## 2024-01-04 NOTE — TELEPHONE ENCOUNTER
Called and spoke with Nila in regards to getting her scheduled for surgery. I did confirm that surgery will be at [facility]. I did go over the surgical protocol and post op appt date and time. She states understanding with no questions or concerns.

## 2024-01-04 NOTE — PROGRESS NOTES
Clinic Note [facility]     Assessment/Plan:  68 year old female    Left extra-articular distal radius fracture- patient is functionally recovered other than some mild ulnar-sided wrist pain.  This could be secondary to impingement or impaction from the distal ulna stump.  Would recommend continued observation for the time being.  If she continues to be symptomatic to warrant an intervention would repeat XR/CT and consider resection more of the distal ulna.  Intermittent tingling in the palm- resolved  Spontaneous rupture of tendon of left hand-evidence of small finger EDC/EDM tendon rupture is noted.  Patient does have a 15 or so degree extensor lag of the small finger.  Intact extension of small finger likely secondary to juncturerae between the small finger and ring finger.  We discussed a few different treatment options.  We can proceed with observation and see how she does functionally as I think much of the pain will improve, but synovitis is unlikely to improve.  The fourth dorsal compartment tendons look normal without significant concern for tendinosis or attritional wear.  Extensor lag will likely be permanent and do not expect to improve however may not result in significant functional deficit.  Since repair is likely not feasible secondary to the attritional nature of the rupture, could consider an i intercalary graft versus small finger to ring finger tendon transfer of which the latter is likely to be technically easier and provide a more predictable outcome.  The patient would like to observe for the near future to see how much pain improves and what her functional deficits are.  If her functional deficits are significant enough to affect her activities of daily living we will proceed with a small finger EDC/EDM tendon transfer into the ring finger EDC.  Local plus MAC to assess intraoperative tension will be performed.  We will also consider tenosynovectomy of the DRUJ joint.  Patient is  tentatively scheduled for surgery in February    Follow Up: At the time of surgery.    Diagnostic Studies:  X-ray left wrist 3 views:distal radius fracture with radiographic signs of healing.  No significant changes in alignment.   CT left wrist: A extra-articular distal radius fracture with loss of intermediate column height.  Overall radial column length is intact.  No significant volar/dorsal angulation.  No impaction of the resected distal ulna on the fracture  MRI Left Hand: no evidence of sagital band or collateral ligament injury of small finger. In the proximal most axial sequence,discontinuity of small finger EDC/EDM was noted  MRI left wrist: Evidence of EDC EDM discontinuity/rupture at the level of the wrist/DRUJ is noted.  There is significant synovitis of the between the distal radius and the distal ulna stump.  No evidence of fracture deformity resulting in attritional wear/rupture.    BS Ultrasound left hand: no signs of tendon rupture over the dorsal aspect of the hand.    Physical Exam:    Ht 5' 2.6\" (1.59 m)   Wt 123 lb (55.8 kg)   BMI 22.07 kg/m²     Constitutional: NAD. AOx3. Well-developed and Well-nourished.   Psychiatric: Normal mood/ affect/ behavior. Judgment and thought content normal.     Left upper Extremity:   Inspection: Skin Intact. No skin lesions.  Trace deformity of the wrist noted.  Improving ecchymoses over the dorsal ulnar aspect of the hand near the  MCP joint.   Palpation:  No tenderness to palpation of the fracture site.  Mild discomfort over the ulnar aspect of the wrist along the ECU tendon/end of distal ulna   Motion: Elbow: normal bilateral symmetric ext/flex  Wrist: normal bilateral symmetric ext/flex/sup/pro.  Slight loss of radial deviation when compared to the contralateral side  Finger: near full composite fist, able to extend digits at MCP joint   15-20 deg extensor lag of small finger.    Special Tests:  The hand is normally perfused and normally sensate       CC:  Left wrist fracture    HPI: This 68 year old right-hand-dominant female presents with a left wrist fracture sustained on March 9 that was subsequently treated nonsurgically with full functional recovery.  Patient reports a recent fall on 12/9/2023 with new wrist pain.On Saturday, patient was at her sons house when she looked at her hand and she stated that the pinky finger looked like it had  from her hand. The ER put it in a temp partial cast.    Interval Hx (12/20/2023): Patient will be going on a cruise from Jan 2024- Feb 2024.No majorimprovement in functional deficit. Pain is likely better.      Past Medical History:   Diagnosis Date    Arthritis I don’t know    Back pain occasionally    Blurred vision sometimes    Change in hair For several years now    Constipation On and off all my life    Frequent urination All the time when I drink water frequently    Frequent use of laxatives This week use Miralax    Hemorrhoids occasionally    Hypothyroidism     Leaking of urine Have to wear a panty shield    Osteoporosis For at least 10 years    Pain in joints Sometimes    Presence of other cardiac implants and grafts Knee replacements    Rheumatoid arthritis (HCC)     Sleep disturbance Wake up on and off throughout the night    Stool incontinence On and off all my life    Wears glasses glasses     Past Surgical History:   Procedure Laterality Date    APPENDECTOMY  over 40 years ago    CATARACT Bilateral 07/2023    COLONOSCOPY  over 5 years ago    KNEE REPLACEMENT SURGERY      FRANCISCO J LOCALIZATION WIRE 1 SITE LEFT (CPT=19281)       Current Outpatient Medications   Medication Sig Dispense Refill    Upadacitinib ER (RINVOQ) 15 MG Oral Tablet 24 Hr Take 1 tablet by mouth daily. 90 tablet 1    famciclovir 500 MG Oral Tab Take 1 tablet (500 mg total) by mouth 3 (three) times daily. 21 tablet 0    LEVOTHYROXINE 75 MCG Oral Tab TAKE 1 TABLET DAILY 90 tablet 3    Multiple Vitamins-Minerals (PRESERVISION AREDS 2 OR) Take by  mouth.      Cholecalciferol (VITAMIN D) 50 MCG (2000 UT) Oral Cap       Turmeric Curcumin 500 MG Oral Cap Take by mouth.      Krill Oil 350 MG Oral Cap Take by mouth.      Calcium Carbonate-Vitamin D 250-125 MG-UNIT Oral Tab Take 1 tablet by mouth daily.      Multiple Vitamin (MULTI-VITAMIN DAILY OR) Take by mouth.       Allergies   Allergen Reactions    Penicillins HIVES    Septra [Sulfamethoxazole W/Trimethoprim] OTHER (SEE COMMENTS)     Lowers white blood count    Sulfa Antibiotics OTHER (SEE COMMENTS)     Effects heart       Family History   Problem Relation Age of Onset    Other (lung cancer) Mother     Other (lung cancer) Father     Arthritis Sister     Diabetes Brother      Social History     Occupational History    Not on file   Tobacco Use    Smoking status: Never     Passive exposure: Yes    Smokeless tobacco: Never   Vaping Use    Vaping Use: Never used   Substance and Sexual Activity    Alcohol use: Not Currently     Comment: Don't drink because of RA meds I take    Drug use: Never    Sexual activity: Not on file        Review of Systems (negative unless bolded):  General: fevers, chills, fatigue  CV:  chest pain, palpitations, leg swelling  Msk: bodyaches, neck pain, neck stiffness  Skin: rashes, open wounds, nonhealing ulcers  Hem: bleeds easily, bruise easily, immunocompromised  Neuro: dizziness, light headedness, headaches  Psych: anxious, depressed, anger issues        Chris Quiñonez MD  Hand, Wrist, & Elbow Surgery  [facility]  [address] [place] [address], [address], [email or web address]  [email or web address]  t: [phone]  f: [phone]

## 2024-01-04 NOTE — PROGRESS NOTES
SURGICAL BOOKING SHEET   Name: Nila Glass  MRN: [id number]   : 3/19/1955    Surgical Date:   24   Surgical Consent:   Left small finger extensor tendon transfer to left ring finger extensor tendon   Diagnosis:      ICD-10-CM   1. Spontaneous rupture of tendon of left hand, unspecified tendon type  M66.842       Procedure Codes:     Tendon transfer, flexor/extensor, CMC/dorsal hand, each tendon (CPT 65897)   Disposition:   Outpatient   Operative Time:   45 mins   Antibiotics:   Ancef 2g   Anesthesia Type:   Monitored Anesthesia Care (MAC)   Clearance:    NONE   Equipment:   Tendon Repair Instrument Tray, [place] Blade, Local Pre-Mix (1% lidocaine w/ epi, 0.5% marcaine, and 8.4% NaBicarb), 3-0 ethibond, 4-0 monocryl, skin glue, Adaptic, 4x4 gauze, 2 inch ace wrap   Assistant:   Khang Assistant: Sabrina, Tiffany Dias PA-C   Follow Up:   7-10 days post op with Tiffany Dias   Pain Medication:   Norco 325-5mg   Therapy:   [facility]

## 2024-01-04 NOTE — TELEPHONE ENCOUNTER
Date of Surgery: 24       Post Op Appt: 24 @ 0800    Case ID: [id number]    Notes:         SURGICAL BOOKING SHEET   Name: Nila Glass  MRN: [id number]   : 3/19/1955     Surgical Date:    24   Surgical Consent:    Left small finger extensor tendon transfer to left ring finger extensor tendon   Diagnosis:         ICD-10-CM   1. Spontaneous rupture of tendon of left hand, unspecified tendon type  M66.842       Procedure Codes:       Tendon transfer, flexor/extensor, CMC/dorsal hand, each tendon (CPT 40507)   Disposition:    Outpatient   Operative Time:    45 mins   Antibiotics:    Ancef 2g   Anesthesia Type:    Monitored Anesthesia Care (MAC)   Clearance:     NONE   Equipment:    Tendon Repair Instrument Tray, [place] Blade, Local Pre-Mix (1% lidocaine w/ epi, 0.5% marcaine, and 8.4% NaBicarb), 3-0 ethibond, 4-0 monocryl, skin glue, Adaptic, 4x4 gauze, 2 inch ace wrap   Assistant:    Khang Assistant: Yes, Tiffany Dias PA-C   Follow Up:    7-10 days post op with Tiffany Dias   Pain Medication:    Norco 325-5mg   Therapy:    [facility]

## 2024-01-05 ENCOUNTER — OFFICE VISIT (OUTPATIENT)
Dept: RHEUMATOLOGY | Facility: CLINIC | Age: 69
End: 2024-01-05
Payer: MEDICARE

## 2024-01-05 VITALS
SYSTOLIC BLOOD PRESSURE: 100 MMHG | DIASTOLIC BLOOD PRESSURE: 74 MMHG | RESPIRATION RATE: 16 BRPM | TEMPERATURE: 97 F | WEIGHT: 131 LBS | HEIGHT: 62.6 IN | BODY MASS INDEX: 23.5 KG/M2 | OXYGEN SATURATION: 97 % | HEART RATE: 65 BPM

## 2024-01-05 DIAGNOSIS — D84.821 IMMUNOCOMPROMISED STATE DUE TO DRUG THERAPY  (HCC): ICD-10-CM

## 2024-01-05 DIAGNOSIS — M15.9 PRIMARY OSTEOARTHRITIS INVOLVING MULTIPLE JOINTS: ICD-10-CM

## 2024-01-05 DIAGNOSIS — Z79.899 IMMUNOCOMPROMISED STATE DUE TO DRUG THERAPY  (HCC): ICD-10-CM

## 2024-01-05 DIAGNOSIS — B02.9 HERPES ZOSTER WITHOUT COMPLICATION: ICD-10-CM

## 2024-01-05 DIAGNOSIS — M05.79 RHEUMATOID ARTHRITIS INVOLVING MULTIPLE SITES WITH POSITIVE RHEUMATOID FACTOR (HCC): Primary | ICD-10-CM

## 2024-01-05 DIAGNOSIS — D70.2 OTHER DRUG-INDUCED NEUTROPENIA (HCC): ICD-10-CM

## 2024-01-05 DIAGNOSIS — Z79.899 HIGH RISK MEDICATION USE: ICD-10-CM

## 2024-01-05 DIAGNOSIS — M81.0 AGE-RELATED OSTEOPOROSIS WITHOUT CURRENT PATHOLOGICAL FRACTURE: ICD-10-CM

## 2024-01-05 PROCEDURE — 99214 OFFICE O/P EST MOD 30 MIN: CPT | Performed by: INTERNAL MEDICINE

## 2024-01-05 RX ORDER — VALACYCLOVIR HYDROCHLORIDE 500 MG/1
TABLET, FILM COATED ORAL
Qty: 120 TABLET | Refills: 0 | Status: SHIPPED | OUTPATIENT
Start: 2024-01-05

## 2024-01-05 NOTE — PROGRESS NOTES
RHEUMATOLOGY FOLLOW UP   Date of visit: 01/05/2024  ?  Chief Complaint   Patient presents with    Follow - Up     Patient comes into the office today for a f/u apt with Dr for rheumatoid arthritis. Patient states that she is feeling ok at this time. Rapid 3 converted to 1.3       ASSESSMENT, DISCUSSION & PLAN   Assessment:  1. Rheumatoid arthritis involving multiple sites with positive rheumatoid factor (HCC)    2. High risk medication use    3. Primary osteoarthritis involving multiple joints    4. Age-related osteoporosis without current pathological fracture    5. Other drug-induced neutropenia (HCC)    6. Herpes zoster without complication    7. Immunocompromised state due to drug therapy  (HCC)        Discussion:  Ms. Nila Glass is a 69 yo woman with hx of seropositive RA as well as osteoporosis and osteoarthritis who has recently moved to the area and interested in transitioning care to new rheumatologist. She has had worsened joint stiffness and swelling particularly of the wrists bilaterally and interested in possibly changing her current regimen.     Last year, there was concern for possible infection due to abnormal knee tap. Follow up with orthopedics has excluded infection. She did get her booster covid vaccination with post-vaccination symptoms almost a week after vaccine. She was restarted on enbrel and methotrexate but had continued worsened joint pain and stiffness.  She has since been switching to orencia and for some reason, discontinued her methotrexate. At a prior visit, we restarted methotrexate, however has more recently been suffering from elevations in her LFTs. Despite holding med for almost a month, these are stable. I do not feel comfortable continuing/restarting methtorexate at this time.     Since that time, she has been started on Rinvoq and has been doing great overall. Denies any significant morning stiffness or joint pain at this time. Has not required any recent  prednisone and feels better than she has in years.   Actually decreased dose of rinvoq every other day due to her concerns over high cholesterol and has tolerated decrease.  Does have scoliosis, mild, that I recommended PT given her osteoporosis and risk of fracture but not interested in medication intervention.     She does have MGUS for which she is now following with hematology regularly.   She also had slightly worsened leukopenia which is likely related to the Rinvoq therapy. Recommended she be sure to follow with heme/onc.   She continues to have constipation, chronic since childhood. Strongly recommended that she push oral hydration since it seems like she does normally stay dehydrated.  She may also want to consider an over-the-counter enema which she used to do as a child.  She had colonoscopy which showed some diverticulosis as well as internal hemorrhoids.  Denies any history of diverticulitis.  More recently, she suffered a fall and broke her wrist which she is doing okay however now has some pinky finger issues and was told that there is a ligamental tear.  Plan for surgical intervention in February after her upcoming cruise.  Updated BMD still showed osteoporosis however,. she still would like to avoid rx intervention for osteoporosis for now. Recommended she continue calcium/vitamin d supplementation.      At this time, she is doing well from an RA standpoint, however she is suffering from shingles infection over her neck.  She was given a different antiviral so I prescribed valacyclovir for patient to take 3 times daily for a week and then take 500 mg daily for suppression at least while on her trip and can consider stopping thereafter.  Discussed at length that Rinvoq does have an increased risk of shingles and if this continues, we may need to consider changing therapy.    Follow-up in 3 to 4 months or sooner as needed  Encourage patient to reach out in the meantime if symptoms change or  worsen.  She will need labs before that visit and to include a lipid panel.      Patient verbalized understanding of above instructions. No further questions at this time.    Code selection for this visit was based on time spent (30min) on date of service in preparing to see the patient, obtaining and/or reviewing separately obtained history, performing a medically appropriate examination, counseling and educating the patient/family/caregiver, ordering medications or testing, referring and communicating with other healthcare providers, documenting clinical information in the E HR, independently interpreting results and communicating results to the patient/family/caregiver and care coordination with the patient's other providers.  ?  Plan:  Diagnoses and all orders for this visit:    Rheumatoid arthritis involving multiple sites with positive rheumatoid factor (HCC)  -     CBC With Differential With Platelet; Future  -     Comp Metabolic Panel (14); Future    High risk medication use  -     CBC With Differential With Platelet; Future  -     Comp Metabolic Panel (14); Future    Primary osteoarthritis involving multiple joints    Age-related osteoporosis without current pathological fracture    Other drug-induced neutropenia (HCC)    Herpes zoster without complication  -     valACYclovir 500 MG Oral Tab; 1000mg TID x 10 days then 500mg daily for suppression    Immunocompromised state due to drug therapy  (HCC)  -     valACYclovir 500 MG Oral Tab; 1000mg TID x 10 days then 500mg daily for suppression            Return in about 3 months (around 4/5/2024).  ?  HPI   Nila Glass is a 68 year old female with the following active problems who has a hx of seropostive RA on methotrexate and Enbrel. She transitioned care to me several months ago and presents for follow up. Her course was complicated by concern for knee infection. She was then started on Orencia without improvement. She has since been on Rinvoq and  presents for follow up today.     Previously on:  Plaquenil- had issues with eyes  Xeljanz by her PCP when first diagnosed - did not notice difference  Humira too short to know if it helped  Enbrel but was having persistent knee pain    Orencia- no improvement of symptoms.   Methotrexate- elevated LFTs so med had to be discontinued      Since her last visit, she has been doing okay.  Dealing with shingles again over the right side of the neck. Happened at beginning of Dec and resolved with antiviral. But has come back this week. Denies nerve pain but itching/rash.   Has had shingles vaccination   Thinks initially stressed due to left hand/wrist.  Has plans to have surgery in Feb when she gets back from vacation. Told there are some ligaments that are torn. Thinks related to original fracture and the following OT/PT and worsened after that.    Prior ankle swelling resolved, attributes to travel/diet.   Trying to drink more water     Denies recent falls or fractures.   No recurrence of rib pain from prior fracture    In terms of the RA- she's doing well. Denies any other joint pain or swelling. No difficulty ambulating.   Denies any current morning stiffness.   Denies skin rashes aside from shingles.     Tolerating rinvoq daily without obvious other side effects. Taking every other day to see if it would help with her cholesterol levels. Denies worsened joint pain. Did increase her krill oil as well.     + dry eyes, feels worsened after cataract surgery (had done July), now using systane since then and helping.   Denies dry mouth. Did not get any dental work due to stressors around the move.    Stable constipation- only once weekly BMs and with taking stool softeners- dulcolax and milk of mag. Denies recent hemorrhoids     Last colonoscopy was when in [place], around age 58.   Reminds me that she was previously on Forteo injections but had no difference in her BMD.   not interested in medication intervention at this  time.  Has established care with oncology- Dr. Simons following for MGUS. Has to follow yearly (around June).     HPI from initial consultation  referred for rheumatologic evaluation due to transition of care since she moved to the area. She has long standing RA.      States she first had pain about 2013, and woke up with severe pain- couldn't walk or use arms/legs. States she was seen by PCP first, given a shot of steroids, symptoms improved but within a week, symptoms came back. Was seen by rheumatologist in [place]. Switched rheum over time due to initial one retiring.  Was started on combination of Enbrel and methotrexate about 7 years ago. Was on higher methotrexate dosing but had side effect- due to nausea. Improved with the lower dosing- has fluctuated between 3-4 tabs now. Was on folic acid but switched to leucovorin but denies specific reasoning.   Seems like she was on Xeljanz by her PCP but unclear. Seems to have talked about Humira instead of Enbrel.   Did try plaquenil but had blurred vision within a week of taking so medication was discontinued.     Initially affect her knees, has since had bilateral knee replacement.  Also affected her wrists bilaterally. Had nodule over the right wrist, also had 'loose tendon' on the right hand. Left wrist surgery in June 2020 but states did not help as much.   Still has stiffness of the hand and the wrist. Was told there was significant signs of RA.   Still has very sensitivity to the touch of the wrist itself. Has decreased  strength. Has not gone through OT or therapy due to concerns of pandemic.     Has setup PCP locally in [place].  Used to work as .      Still has knee pain as well bilaterally. Would go for drainage and states had 8 vials of fluid drawn and told related to RA.     + morning stiffness improved with activity, felt worse in the left hand; lasts for at least 30 minutes. But still has some stiffness throughout the day.  Stiffness returns after being seated/resting for a period of time.   Denies any new nodule formation  Hx of two toes that are  but not clear if due to RA  + hair loss/thinning attributed to methotrexate   + hx of borderline anemia since young age. Was following with hematologist was told there was an abnormal protein in blood. Had to go through bone marrow biopsy. Was told to follow every 6 months. Told no reason for intervention.   + hx of one early miscarriage, able to conceive afterwards and pregnancy relatively normal   + chronic constipation, no blood in stools; last colonoscopy was normal per pt. No diverticulosis/diverticulitis   + hx of plantar fasciitis, improved with in soles   + rare bloody nose, attributes to dryness     The patient denies oral or nasal ulcers, photosensitive rash, elevated or scarring rashes, Raynaud's phenomenon, prior renal or liver disease, or history of seizures. Denies hx of pericarditis or pleuritis.   No history of prior blood clot in the legs or lungs, strokes or ischemic phenomenon.  Denies nonhealing ulcers on the fingertips, trouble swallowing, or severe acid reflux.  The patient denies any history of uveitis, crampy abdominal pain, diarrhea, bloody stools,  nodular painful shin bruises, Achilles heel pain, psoriatic lesions, spooning or pitting of the nails, history of dactylitis.  There are no symptoms of severe dry eyes, dry mouth, or swelling of the cheeks or under the jawbone.   No fevers, chills, lymphadenopathy, night sweats, unexpected weight loss, easy bruising or bleeding.  Denies chronic sinus infections/disease.  Denies chronic cough or hemoptysis.   Denies obvious blood in the urine.     Family hx:  One niece with autoimmune renal disease   Possible RA in her parents but they had arthritis   3 brothers - one with DM and sister with arthritis     Past Medical History:  Past Medical History:   Diagnosis Date    Arthritis I don’t know    Back pain  occasionally    Blurred vision sometimes    Change in hair For several years now    Constipation On and off all my life    Frequent urination All the time when I drink water frequently    Frequent use of laxatives This week use Miralax    Hemorrhoids occasionally    Hypothyroidism     Leaking of urine Have to wear a panty shield    Osteoporosis For at least 10 years    Pain in joints Sometimes    Presence of other cardiac implants and grafts Knee replacements    Rheumatoid arthritis (HCC)     Sleep disturbance Wake up on and off throughout the night    Stool incontinence On and off all my life    Wears glasses glasses     Past Surgical History:  Past Surgical History:   Procedure Laterality Date    APPENDECTOMY  over 40 years ago    CATARACT Bilateral 07/2023    COLONOSCOPY  over 5 years ago    KNEE REPLACEMENT SURGERY      FRANCISCO J LOCALIZATION WIRE 1 SITE LEFT (CPT=19281)     hx of right knee surgical in 20's  Hx appy    Family History:  Family History   Problem Relation Age of Onset    Other (lung cancer) Mother     Other (lung cancer) Father     Arthritis Sister     Diabetes Brother        Social History:  Social History     Socioeconomic History    Marital status:    Tobacco Use    Smoking status: Never     Passive exposure: Yes    Smokeless tobacco: Never   Vaping Use    Vaping Use: Never used   Substance and Sexual Activity    Alcohol use: Not Currently     Comment: Don't drink because of RA meds I take    Drug use: Never     Medications:  Outpatient Medications Marked as Taking for the 1/5/24 encounter (Office Visit) with Karli Goode DO   Medication Sig Dispense Refill    valACYclovir 500 MG Oral Tab 1000mg TID x 10 days then 500mg daily for suppression 120 tablet 0    Upadacitinib ER (RINVOQ) 15 MG Oral Tablet 24 Hr Take 1 tablet by mouth daily. 90 tablet 1    LEVOTHYROXINE 75 MCG Oral Tab TAKE 1 TABLET DAILY 90 tablet 3    Multiple Vitamins-Minerals (PRESERVISION AREDS 2 OR) Take by mouth.       Cholecalciferol (VITAMIN D) 50 MCG (2000 UT) Oral Cap       Turmeric Curcumin 500 MG Oral Cap Take by mouth.      Krill Oil 350 MG Oral Cap Take by mouth.      Calcium Carbonate-Vitamin D 250-125 MG-UNIT Oral Tab Take 1 tablet by mouth daily.      Multiple Vitamin (MULTI-VITAMIN DAILY OR) Take by mouth.       Modified Medications    No medications on file     Medications Discontinued During This Encounter   Medication Reason    famciclovir 500 MG Oral Tab      ?    Allergies:  Allergies   Allergen Reactions    Penicillins HIVES    Septra [Sulfamethoxazole W/Trimethoprim] OTHER (SEE COMMENTS)     Lowers white blood count    Sulfa Antibiotics OTHER (SEE COMMENTS)     Effects heart       ?  REVIEW OF SYSTEMS   ?  Review of Systems   Constitutional:  Positive for malaise/fatigue and weight loss. Negative for chills and fever.   HENT:  Positive for tinnitus (chronic). Negative for congestion, hearing loss and nosebleeds.    Eyes:  Negative for blurred vision, pain and redness.   Respiratory:  Negative for cough, hemoptysis and shortness of breath.    Cardiovascular:  Positive for palpitations (intermittent). Negative for chest pain and leg swelling.   Gastrointestinal:  Positive for constipation (better,chronic). Negative for abdominal pain, blood in stool, diarrhea, heartburn (hx of hiatla hernia), nausea and vomiting.   Genitourinary:  Positive for frequency and urgency. Negative for dysuria and hematuria.   Musculoskeletal:  Positive for back pain (stable/chronic/intermittent). Negative for joint pain, myalgias and neck pain.   Skin:  Negative for itching and rash.   Neurological:  Negative for dizziness, tingling, weakness and headaches.   Endo/Heme/Allergies:  Negative for environmental allergies. Bruises/bleeds easily (chronic unchanged).   Psychiatric/Behavioral:  Negative for depression. The patient has insomnia (due to back pain). The patient is not nervous/anxious.      PHYSICAL EXAM   Today's  Vitals:  Temperature Blood Pressure Heart Rate Resp Rate SpO2   Temp: 97.2 °F (36.2 °C) BP: 100/74 Pulse: 65 Resp: 16 SpO2: 97 %   ?  Current Weight Height BMI BSA Pain   Wt Readings from Last 1 Encounters:   01/05/24 131 lb (59.4 kg)    Height: 5' 2.6\" (159 cm) Body mass index is 23.5 kg/m². Body surface area is 1.61 meters squared.         Physical Exam  Vitals and nursing note reviewed.   Constitutional:       General: She is not in acute distress.     Appearance: Normal appearance. She is well-developed. She is not diaphoretic.   HENT:      Head: Normocephalic.   Eyes:      General: No scleral icterus.     Extraocular Movements: Extraocular movements intact.      Conjunctiva/sclera: Conjunctivae normal.   Neck:      Vascular: No JVD.      Trachea: No tracheal deviation.   Cardiovascular:      Rate and Rhythm: Normal rate and regular rhythm.      Heart sounds: Normal heart sounds. No murmur heard.  Pulmonary:      Effort: Pulmonary effort is normal. No respiratory distress.      Breath sounds: Normal breath sounds. No wheezing.   Musculoskeletal:         General: Deformity present. No swelling or tenderness.      Cervical back: Neck supple.      Comments: Diffuse OA changes of the hands b/l.  Chronic enlargement of MCPs; active synovitis over b/l thumb, index and middle. - resolved  Left wrist restricted since prior injury-no active tenderness/swelling  Left pinky flexion deformity nontender  No swelling, tenderness, redness or restriction of motion of the elbows, ankles, or joints of the feet.  Bilateral shoulders with full ROM  Bilateral knees without medial joint line tenderness, mild crepitus, no more effusions  Scoliosis noted with right scapula being more prominent than the left.  No spinous process tenderness.   Lymphadenopathy:      Cervical: No cervical adenopathy.   Skin:     General: Skin is warm and dry.      Findings: Erythema and rash present.      Comments: No lesions over right trap    Neurological:      Mental Status: She is alert and oriented to person, place, and time.      Cranial Nerves: No cranial nerve deficit.      Gait: Gait normal.   Psychiatric:         Mood and Affect: Mood normal.         Behavior: Behavior normal.       ?  Radiology review:         Narrative   PROCEDURE:  XR DEXA BONE DENSITOMETRY (CPT=77080)     COMPARISON:  None.     INDICATIONS:    M81.0 Age-related osteoporosis without current pathological fracture     PATIENT STATED HISTORY: (As transcribed by Technologist)  Age related osteoporosis without current pathological fracture.          LUMBAR SPINE ANALYSIS RESULTS:      Bone mineral density (BMD) (g/cm2):  0.758    Lumbar T-Score:  -2.6      % young normals:  72      % age matched controls:  93      Change from prior spine examination:  n/a              TOTAL HIP ANALYSIS RESULTS:        Bone mineral density (BMD) (g/cm2):  0.684      Total Hip T-Score:  -2.1      % young normals:  73      % age matched controls:  90      Change from prior hip examination:  n/a              FEMORAL NECK ANALYSIS RESULTS:        Bone mineral density (BMD) (g/cm2):  0.597      Femoral neck T-Score:  -2.3      % young normals:  70      % age matched controls:  92      Change from prior hip examination:  n/a            ADDITIONAL FINDINGS:  No significant additional findings.           Impression   CONCLUSION:  Bone mineral density values for lumbar spine are compatible with the diagnosis of osteoporosis by WHO definition (T score less than -2.5). If therapy is initiated, a follow-up study in 1 year may aid in evaluation of therapeutic efficacy.           The World Health Organization has defined the following categories based on bone density:  Normal bone:  T-score better than -1  Osteopenia: T-score between -1 and -2.5  Osteoporosis:  T-score less than -2.5        LOCATION:  [facility]                Dictated by (CST): Hector Quiñonez MD on 6/05/2023 at 11:18 AM      Finalized by (CST):  Hector Quiñonez MD on 6/05/2023 at 11:19 AM       PROCEDURE:  XR SHOULDER, COMPLETE (MIN 2 VIEWS), RIGHT (CPT=73030)          TECHNIQUE:  Multiple views were obtained.       COMPARISON:  None.       INDICATIONS:  M25.511 Right shoulder pain, unspecified chronicity       PATIENT STATED HISTORY: (As transcribed by Technologist)  Pt here for ortho consult. Pt having difficulty raising her right arm for 6 weeks. Pt c/o pain laterally. Hx Rheumatoid Arthritis.        FINDINGS:  No evidence of acute displaced fracture or dislocation.  Osteopenia.  Unremarkable soft tissues.                        Impression   CONCLUSION:  No evidence of acute displaced fracture or dislocation in the right shoulder.       Dictated by (CST): Merrill Pope MD on 1/06/2022 at 8:33 AM       Finalized by (CST): Merrill Pope MD on 1/06/2022 at 8:35 AM       05/2021  Bone density  Lumbar spine T score -2.5  Proximal femur T score -2.2  Total hip T score -2.2  FRAX: Major osteoporotic fracture 25.9%; hip 5.8%  Impression: Osteoporosis        Labs:  Lab Results   Component Value Date    WBC 3.0 (L) 01/02/2024    RBC 4.62 01/02/2024    HGB 13.6 01/02/2024    HCT 42.8 01/02/2024    .0 01/02/2024    MCV 92.6 01/02/2024    MCH 29.4 01/02/2024    MCHC 31.8 01/02/2024    RDW 14.6 01/02/2024    NEPRELIM 1.70 01/02/2024    NEPERCENT 55.9 01/02/2024    LYPERCENT 33.2 01/02/2024    MOPERCENT 8.6 01/02/2024    EOPERCENT 1.3 01/02/2024    BAPERCENT 0.7 01/02/2024    NE 1.70 01/02/2024    LYMABS 1.01 01/02/2024    MOABSO 0.26 01/02/2024    EOABSO 0.04 01/02/2024    BAABSO 0.02 01/02/2024     Lab Results   Component Value Date    GLU 94 01/02/2024    BUN 16 01/02/2024    BUNCREA 30.5 (H) 05/04/2021    CREATSERUM 0.66 01/02/2024    ANIONGAP 1 01/02/2024    GFRNAA 64 07/15/2022    GFRAA 73 07/15/2022    CA 9.0 01/02/2024    OSMOCALC 289 01/02/2024    ALKPHO 63 01/02/2024    AST 36 01/02/2024    ALT 49 01/02/2024    BILT 0.5 01/02/2024    TP 7.2  01/02/2024    ALB 4.1 01/02/2024    GLOBULIN 3.1 01/02/2024     01/02/2024    K 3.7 01/02/2024     01/02/2024    CO2 31.0 01/02/2024       Additional Labs:  01/2024  CMP grossly normal  CBC with WBC 3.0 otherwise normal  Vitamin D 66 normal  Ferritin and iron studies normal    On 8/2023  Lipid panel: Total cholesterol 275 elevated; HDL 97; triglycerides 66 normal;  elevated    11/2022  ESR 7 normal  CRP normal  Vitamin D 56.8    07/2022  ESR 9 normal  CRP normal    04/2022  Vit D 29  SPEP with M spike 0.6  CRP 0.41 borderline elevated   ESR 24  PTH normal   CMP with AST 46; ALT 65    03/2022  ESR 5 normal  CRP normal   CMP with  AST 44; ALT 76    02/2022  ESR 50 elevated  CRP 1.30 elevated     11/2021  ESR 57 elevated  CRP 3.20 elevated  SPEP with a clonal spike in gamma region; monoclonal IgG lambda; normal free light chain ratio     08/2021  ESR 44  CRP 1.14    05/2021  CRP 0.42  ESR 24    01/2021  Vit D 54  CRP normal   ESR 11    Records from previous rheum reviewed (scanned in chart)  RF ccp positive started on 5 tab methotrexate and Enbrel   Forteo May 2015  Previously tried humira xeljanz plaquenil  Rodrigo listed as allergy   Hx RA, OA, MGUS and osteoporosis   Hx of Left knee aspiration   Increase 6 tab 8/2020    Karli Goode, DO  [facility]  01/05/2024

## 2024-02-14 ENCOUNTER — TELEPHONE (OUTPATIENT)
Dept: ORTHOPEDICS CLINIC | Facility: CLINIC | Age: 69
End: 2024-02-14

## 2024-02-14 NOTE — TELEPHONE ENCOUNTER
Left message for patient to call our office back. Also called her  and left a message. Providers are wondering if she is still interested in surgery for this Friday. Requested they call us back

## 2024-02-26 ENCOUNTER — NURSE TRIAGE (OUTPATIENT)
Dept: INTERNAL MEDICINE CLINIC | Facility: CLINIC | Age: 69
End: 2024-02-26

## 2024-02-26 NOTE — TELEPHONE ENCOUNTER
She went on a cruise ship and she developed a cough a month ago. She is still coughing wants to get in today. Nothing available in the next few days. Covid negative. If AD will prescribe their preferred pharmacy [facility].

## 2024-02-26 NOTE — TELEPHONE ENCOUNTER
Action Requested: Summary for Provider     []  Critical Lab, Recommendations Needed  [] Need Additional Advice  [x]   FYI    []   Need Orders  [] Need Medications Sent to Pharmacy  []  Other     SUMMARY: Scheduled OV with CS 2/29/24     Reason for call: Sick Call  Onset: Data Unavailable    Was on a cruise last month  Ongoing cough for one month  Denies fever  Covid negative  OTC cough meds not helping (Tried Mucinex and Nyquil)   Stated a lot of people on the cruise ship were coughing   Had abx prophylaxis, ordered by AD. Did take without relief of symptoms   Advised if symptoms worsen to be seen in UC      Reason for Disposition   Cough has been present for > 3 weeks    Protocols used: Cough-A-OH

## 2024-02-29 ENCOUNTER — OFFICE VISIT (OUTPATIENT)
Dept: INTERNAL MEDICINE CLINIC | Facility: CLINIC | Age: 69
End: 2024-02-29
Payer: MEDICARE

## 2024-02-29 ENCOUNTER — HOSPITAL ENCOUNTER (OUTPATIENT)
Dept: GENERAL RADIOLOGY | Age: 69
Discharge: HOME | End: 2024-02-29
Attending: PHYSICIAN ASSISTANT
Payer: MEDICARE

## 2024-02-29 VITALS
SYSTOLIC BLOOD PRESSURE: 112 MMHG | RESPIRATION RATE: 18 BRPM | HEART RATE: 75 BPM | WEIGHT: 130.81 LBS | HEIGHT: 63 IN | TEMPERATURE: 97 F | BODY MASS INDEX: 23.18 KG/M2 | OXYGEN SATURATION: 98 % | DIASTOLIC BLOOD PRESSURE: 68 MMHG

## 2024-02-29 DIAGNOSIS — J06.9 UPPER RESPIRATORY TRACT INFECTION, UNSPECIFIED TYPE: Primary | ICD-10-CM

## 2024-02-29 DIAGNOSIS — J06.9 UPPER RESPIRATORY TRACT INFECTION, UNSPECIFIED TYPE: ICD-10-CM

## 2024-02-29 PROCEDURE — 99213 OFFICE O/P EST LOW 20 MIN: CPT | Performed by: PHYSICIAN ASSISTANT

## 2024-02-29 PROCEDURE — 71046 X-RAY EXAM CHEST 2 VIEWS: CPT | Performed by: PHYSICIAN ASSISTANT

## 2024-02-29 RX ORDER — ALBUTEROL SULFATE 90 UG/1
2 AEROSOL, METERED RESPIRATORY (INHALATION) EVERY 4 HOURS PRN
Qty: 1 EACH | Refills: 0 | Status: SHIPPED | OUTPATIENT
Start: 2024-02-29

## 2024-02-29 RX ORDER — BENZONATATE 100 MG/1
100 CAPSULE ORAL 3 TIMES DAILY PRN
Qty: 30 CAPSULE | Refills: 0 | Status: SHIPPED | OUTPATIENT
Start: 2024-02-29

## 2024-02-29 NOTE — PROGRESS NOTES
Nila Glass is a 68 year old female.   Chief Complaint   Patient presents with    Cough     EJ Rm 11- Pt is here for a cough she had for over a month     HPI:    Pt presents with cough x 1 month. Began while on a cruise to [place] and [facility].   Cough is dry. Denies wheezing and SOB. Denies fever and chills. Covid test negative. She tried taking 2 rounds of z adelia while on the cruise with no improvement.   She has tried dayquil, nyquil, and cough drops with limited benefit.   No asthma   Non-smoker    Allergies:  Allergies   Allergen Reactions    Penicillins HIVES    Septra [Sulfamethoxazole W/Trimethoprim] OTHER (SEE COMMENTS)     Lowers white blood count    Sulfa Antibiotics OTHER (SEE COMMENTS)     Effects heart        Current Meds:  Current Outpatient Medications   Medication Sig Dispense Refill    benzonatate 100 MG Oral Cap Take 1 capsule (100 mg total) by mouth 3 (three) times daily as needed for cough. 30 capsule 0    albuterol (PROAIR HFA) 108 (90 Base) MCG/ACT Inhalation Aero Soln Inhale 2 puffs into the lungs every 4 (four) hours as needed. 1 each 0    valACYclovir 500 MG Oral Tab 1000mg TID x 10 days then 500mg daily for suppression 120 tablet 0    Upadacitinib ER (RINVOQ) 15 MG Oral Tablet 24 Hr Take 1 tablet by mouth daily. 90 tablet 1    LEVOTHYROXINE 75 MCG Oral Tab TAKE 1 TABLET DAILY 90 tablet 3    Multiple Vitamins-Minerals (PRESERVISION AREDS 2 OR) Take by mouth.      Cholecalciferol (VITAMIN D) 50 MCG (2000 UT) Oral Cap       Turmeric Curcumin 500 MG Oral Cap Take by mouth.      Krill Oil 350 MG Oral Cap Take by mouth.      Calcium Carbonate-Vitamin D 250-125 MG-UNIT Oral Tab Take 1 tablet by mouth daily.      Multiple Vitamin (MULTI-VITAMIN DAILY OR) Take by mouth.          PMH:     Past Medical History:   Diagnosis Date    Arthritis I don’t know    Back pain occasionally    Blurred vision sometimes    Change in hair For several years now    Constipation On and off all my life     Disorder of thyroid     Frequent urination All the time when I drink water frequently    Frequent use of laxatives This week use Miralax    Hemorrhoids occasionally    Hypothyroidism     Leaking of urine Have to wear a panty shield    Osteoporosis For at least 10 years    Pain in joints Sometimes    Presence of other cardiac implants and grafts Knee replacements    Rheumatoid arthritis (HCC)     Sleep disturbance Wake up on and off throughout the night    Stool incontinence On and off all my life    Visual impairment     glasses/contacts    Wears glasses glasses       ROS:   GENERAL: Negative for fever, chills and fatigue. NAD.  HENT: Negative for congestion, sore throat, and ear pain.  RESPIRATORY: Negative for chest tightness, shortness of breath and wheezing.  +cough  CV: Negative for chest pain, palpitations and leg swelling.   GI: Negative for nausea, vomiting, abdominal pain, diarrhea, and blood in stool.   : Negative for dysuria, hematuria and difficulty urinating.   MUSCULOSKELETAL: Negative for myalgias, back pain, joint swelling, arthralgias and gait problem.   NEURO: Negative for dizziness, syncope, weakness, numbness, tingling and headaches.   PSYCH: The patient is not nervous/anxious. No depression.      PHYSICAL EXAM:    /68   Pulse 75   Temp 96.7 °F (35.9 °C) (Temporal)   Resp 18   Ht 5' 3\" (1.6 m)   Wt 130 lb 12.8 oz (59.3 kg)   SpO2 98%   BMI 23.17 kg/m²     GENERAL: NAD. A&Ox3  HEENT: Ear canals clear, TMs pearly gray bilaterally. Throat without erythema or exudates.  NECK: No LAD.   RESPIRATORY: CTAB, no R/R/W  CV: RRR, no murmurs.   PSYCH: Appropriate mood and affect.      ASSESSMENT/ PLAN:   1. Upper respiratory tract infection, unspecified type  Likely viral   Check CXR due to duration of symptoms   Tessalon prn   Albuterol prn   - XR CHEST PA + LAT CHEST (CPT=71046); Future       Health Maintenance Due   Topic Date Due    TB Screen  01/27/2022    Fall Risk Screening (Annual)   01/01/2024    Annual Physical  03/30/2024           Pt indicates understanding and agrees to the plan.     No follow-ups on file.    Tayler La PA-C

## 2024-03-01 ENCOUNTER — PATIENT MESSAGE (OUTPATIENT)
Dept: INTERNAL MEDICINE CLINIC | Facility: CLINIC | Age: 69
End: 2024-03-01

## 2024-03-01 NOTE — TELEPHONE ENCOUNTER
From: Nila Glass  To: Tayler La  Sent: 3/1/2024 11:04 AM CST  Subject: Followup after chest xray    Willem Lester. I saw the results of the xray and wondered if you have had time to look at it. They suggest possible COPD, which concerns me. I have never smoked in my life so not sure where this is coming from. Can you look at the xrays and call me with your thoughts. Thank you Nila Glass

## 2024-03-01 NOTE — TELEPHONE ENCOUNTER
Xray ordered by CS completed yesterday, not yet resulted.     Sending to CS for review of imaging.

## 2024-03-10 ENCOUNTER — TELEPHONE (OUTPATIENT)
Dept: INTERNAL MEDICINE CLINIC | Facility: CLINIC | Age: 69
End: 2024-03-10

## 2024-03-10 DIAGNOSIS — E78.00 PURE HYPERCHOLESTEROLEMIA: ICD-10-CM

## 2024-03-10 DIAGNOSIS — Z00.00 ROUTINE GENERAL MEDICAL EXAMINATION AT A HEALTH CARE FACILITY: Primary | ICD-10-CM

## 2024-03-10 DIAGNOSIS — E03.9 ACQUIRED HYPOTHYROIDISM: ICD-10-CM

## 2024-03-25 RX ORDER — LEVOTHYROXINE SODIUM 0.07 MG/1
75 TABLET ORAL DAILY
Qty: 90 TABLET | Refills: 3 | Status: SHIPPED | OUTPATIENT
Start: 2024-03-25

## 2024-03-28 ENCOUNTER — ORDER TRANSCRIPTION (OUTPATIENT)
Dept: ADMINISTRATIVE | Facility: HOSPITAL | Age: 69
End: 2024-03-28

## 2024-03-28 ENCOUNTER — OFFICE VISIT (OUTPATIENT)
Dept: ORTHOPEDICS CLINIC | Facility: CLINIC | Age: 69
End: 2024-03-28
Payer: MEDICARE

## 2024-03-28 VITALS — BODY MASS INDEX: 23.04 KG/M2 | HEIGHT: 63 IN | WEIGHT: 130 LBS

## 2024-03-28 DIAGNOSIS — M66.842: Primary | ICD-10-CM

## 2024-03-28 DIAGNOSIS — Z12.31 ENCOUNTER FOR SCREENING MAMMOGRAM FOR MALIGNANT NEOPLASM OF BREAST: Primary | ICD-10-CM

## 2024-03-28 PROCEDURE — 99212 OFFICE O/P EST SF 10 MIN: CPT | Performed by: ORTHOPAEDIC SURGERY

## 2024-03-28 NOTE — PROGRESS NOTES
Clinic Note [facility]     Assessment/Plan:  69 year old female    Left extra-articular distal radius fracture- patient is functionally recovered other than some mild ulnar-sided wrist pain.  This could be secondary to impingement or impaction from the distal ulna stump.  Would recommend continued observation for the time being.  If she continues to be symptomatic to warrant an intervention would repeat XR/CT and consider resection more of the distal ulna.  Intermittent tingling in the palm- resolved  Spontaneous rupture of tendon of left hand-Patient  has minimal extensor lag- functionally no major deficits other than cannot interdependently extend small finger. She is currently happy with her function and deferred any additional treatment at this point    Follow Up: As Needed    Diagnostic Studies:  X-ray left wrist 3 views:distal radius fracture with radiographic signs of healing.  No significant changes in alignment.   CT left wrist: A extra-articular distal radius fracture with loss of intermediate column height.  Overall radial column length is intact.  No significant volar/dorsal angulation.  No impaction of the resected distal ulna on the fracture  MRI Left Hand: no evidence of sagital band or collateral ligament injury of small finger. In the proximal most axial sequence,discontinuity of small finger EDC/EDM was noted  MRI left wrist: Evidence of EDC EDM discontinuity/rupture at the level of the wrist/DRUJ is noted.  There is significant synovitis of the between the distal radius and the distal ulna stump.  No evidence of fracture deformity resulting in attritional wear/rupture.    BS Ultrasound left hand: no signs of tendon rupture over the dorsal aspect of the hand.    Physical Exam:    Ht 5' 3\" (1.6 m)   Wt 130 lb (59 kg)   BMI 23.03 kg/m²     Constitutional: NAD. AOx3. Well-developed and Well-nourished.   Psychiatric: Normal mood/ affect/ behavior. Judgment and thought content normal.     Left  upper Extremity:   Inspection: Skin Intact. No skin lesions.     Palpation:  No tenderness to palpation of the fracture site.  Mild discomfort over the ulnar aspect of the wrist along the ECU tendon/end of distal ulna. No focalle areas of tenderness.   Motion: Elbow: normal bilateral symmetric ext/flex  Wrist: normal bilateral symmetric ext/flex/sup/pro.  Slight loss of radial deviation when compared to the contralateral side  Finger: Full composite fist   5 degree lag at MCP joint small finger   Special Tests:  The hand is normally perfused and normally sensate       CC: Left wrist fracture    HPI: This 69 year old right-hand-dominant female presents with a left wrist fracture sustained on March 9 that was subsequently treated nonsurgically with full functional recovery.  Patient reports a recent fall on 12/9/2023 with new wrist pain.On Saturday, patient was at her sons house when she looked at her hand and she stated that the pinky finger looked like it had  from her hand. The ER put it in a temp partial cast.    Interval Hx (12/20/2023): Patient will be going on a cruise from Jan 2024- Feb 2024.No majorimprovement in functional deficit. Pain is likely better.    Interval Hx (3/28/2024): Patient is in office today for follow-up visit. She mentions she does not actively feel a lot of pain however she has slight shooting pain from time to time. Patient got a cough from her cruise trip that lasted up to 8 weeks.    Past Medical History:   Diagnosis Date    Arthritis I don’t know    Back pain occasionally    Blurred vision sometimes    Change in hair For several years now    Constipation On and off all my life    Disorder of thyroid     Frequent urination All the time when I drink water frequently    Frequent use of laxatives This week use Miralax    Hemorrhoids occasionally    Hypothyroidism     Leaking of urine Have to wear a panty shield    Osteoporosis For at least 10 years    Pain in joints Sometimes     Presence of other cardiac implants and grafts Knee replacements    Rheumatoid arthritis (HCC)     Sleep disturbance Wake up on and off throughout the night    Stool incontinence On and off all my life    Visual impairment     glasses/contacts    Wears glasses glasses     Past Surgical History:   Procedure Laterality Date    APPENDECTOMY  over 40 years ago    CATARACT Bilateral 07/2023    COLONOSCOPY  over 5 years ago    FOREARM/WRIST SURGERY UNLISTED Bilateral     KNEE REPLACEMENT SURGERY      FRANCISCO J LOCALIZATION WIRE 1 SITE LEFT (CPT=19281)       Current Outpatient Medications   Medication Sig Dispense Refill    levothyroxine (SYNTHROID) 75 MCG Oral Tab Take 1 tablet (75 mcg total) by mouth daily. 90 tablet 3    Upadacitinib ER (RINVOQ) 15 MG Oral Tablet 24 Hr Take 1 tablet by mouth daily. 90 tablet 1    Multiple Vitamins-Minerals (PRESERVISION AREDS 2 OR) Take by mouth.      Cholecalciferol (VITAMIN D) 50 MCG (2000 UT) Oral Cap       Turmeric Curcumin 500 MG Oral Cap Take by mouth.      Krill Oil 350 MG Oral Cap Take by mouth.      Calcium Carbonate-Vitamin D 250-125 MG-UNIT Oral Tab Take 1 tablet by mouth daily.      Multiple Vitamin (MULTI-VITAMIN DAILY OR) Take by mouth.      benzonatate 100 MG Oral Cap Take 1 capsule (100 mg total) by mouth 3 (three) times daily as needed for cough. 30 capsule 0    albuterol (PROAIR HFA) 108 (90 Base) MCG/ACT Inhalation Aero Soln Inhale 2 puffs into the lungs every 4 (four) hours as needed. 1 each 0    valACYclovir 500 MG Oral Tab 1000mg TID x 10 days then 500mg daily for suppression 120 tablet 0     Allergies   Allergen Reactions    Penicillins HIVES    Septra [Sulfamethoxazole W/Trimethoprim] OTHER (SEE COMMENTS)     Lowers white blood count    Sulfa Antibiotics OTHER (SEE COMMENTS)     Effects heart       Family History   Problem Relation Age of Onset    Other (lung cancer) Mother     Other (lung cancer) Father     Arthritis Sister     Diabetes Brother      Social History      Occupational History    Not on file   Tobacco Use    Smoking status: Never     Passive exposure: Yes    Smokeless tobacco: Never   Vaping Use    Vaping Use: Never used   Substance and Sexual Activity    Alcohol use: Not Currently     Comment: Don't drink because of RA meds I take    Drug use: Never    Sexual activity: Not on file        Review of Systems (negative unless bolded):  General: fevers, chills, fatigue  CV:  chest pain, palpitations, leg swelling  Msk: bodyaches, neck pain, neck stiffness  Skin: rashes, open wounds, nonhealing ulcers  Hem: bleeds easily, bruise easily, immunocompromised  Neuro: dizziness, light headedness, headaches  Psych: anxious, depressed, anger issues    Attention: This note has been scribed by Zarina Limon under the supervision of Chris Quiñonez MD.        Chris Quiñonez MD  Hand, Wrist, & Elbow Surgery  [facility]  [address] [place] [address], [address], [email or web address]  [email or web address]  t: [phone]  f: [phone]

## 2024-04-12 RX ORDER — PREDNISONE 20 MG/1
20 TABLET ORAL DAILY
Qty: 5 TABLET | Refills: 0 | Status: SHIPPED | OUTPATIENT
Start: 2024-04-12 | End: 2024-04-17

## 2024-04-19 ENCOUNTER — LAB ENCOUNTER (OUTPATIENT)
Dept: LAB | Age: 69
End: 2024-04-19
Attending: INTERNAL MEDICINE
Payer: MEDICARE

## 2024-04-19 DIAGNOSIS — Z00.00 ROUTINE GENERAL MEDICAL EXAMINATION AT A HEALTH CARE FACILITY: ICD-10-CM

## 2024-04-19 DIAGNOSIS — Z79.899 HIGH RISK MEDICATION USE: ICD-10-CM

## 2024-04-19 DIAGNOSIS — E03.9 ACQUIRED HYPOTHYROIDISM: ICD-10-CM

## 2024-04-19 DIAGNOSIS — E78.00 PURE HYPERCHOLESTEROLEMIA: ICD-10-CM

## 2024-04-19 DIAGNOSIS — M05.79 RHEUMATOID ARTHRITIS INVOLVING MULTIPLE SITES WITH POSITIVE RHEUMATOID FACTOR (HCC): ICD-10-CM

## 2024-04-19 LAB
ALBUMIN SERPL-MCNC: 4.1 G/DL (ref 3.4–5)
ALBUMIN/GLOB SERPL: 1.2 {RATIO} (ref 1–2)
ALP LIVER SERPL-CCNC: 59 U/L
ALT SERPL-CCNC: 24 U/L
ANION GAP SERPL CALC-SCNC: 8 MMOL/L (ref 0–18)
AST SERPL-CCNC: 28 U/L (ref 15–37)
BASOPHILS # BLD AUTO: 0.04 X10(3) UL (ref 0–0.2)
BASOPHILS NFR BLD AUTO: 1.3 %
BILIRUB SERPL-MCNC: 0.5 MG/DL (ref 0.1–2)
BUN BLD-MCNC: 14 MG/DL (ref 9–23)
CALCIUM BLD-MCNC: 9.5 MG/DL (ref 8.5–10.1)
CHLORIDE SERPL-SCNC: 106 MMOL/L (ref 98–112)
CHOLEST SERPL-MCNC: 254 MG/DL (ref ?–200)
CO2 SERPL-SCNC: 25 MMOL/L (ref 21–32)
CREAT BLD-MCNC: 0.73 MG/DL
EGFRCR SERPLBLD CKD-EPI 2021: 88 ML/MIN/1.73M2 (ref 60–?)
EOSINOPHIL # BLD AUTO: 0.08 X10(3) UL (ref 0–0.7)
EOSINOPHIL NFR BLD AUTO: 2.7 %
ERYTHROCYTE [DISTWIDTH] IN BLOOD BY AUTOMATED COUNT: 14.3 %
FASTING PATIENT LIPID ANSWER: YES
FASTING STATUS PATIENT QL REPORTED: YES
GLOBULIN PLAS-MCNC: 3.3 G/DL (ref 2.8–4.4)
GLUCOSE BLD-MCNC: 89 MG/DL (ref 70–99)
HCT VFR BLD AUTO: 43.9 %
HDLC SERPL-MCNC: 85 MG/DL (ref 40–59)
HGB BLD-MCNC: 14.7 G/DL
IMM GRANULOCYTES # BLD AUTO: 0 X10(3) UL (ref 0–1)
IMM GRANULOCYTES NFR BLD: 0 %
LDLC SERPL CALC-MCNC: 157 MG/DL (ref ?–100)
LYMPHOCYTES # BLD AUTO: 1.09 X10(3) UL (ref 1–4)
LYMPHOCYTES NFR BLD AUTO: 36.3 %
MCH RBC QN AUTO: 29.9 PG (ref 26–34)
MCHC RBC AUTO-ENTMCNC: 33.5 G/DL (ref 31–37)
MCV RBC AUTO: 89.4 FL
MONOCYTES # BLD AUTO: 0.36 X10(3) UL (ref 0.1–1)
MONOCYTES NFR BLD AUTO: 12 %
NEUTROPHILS # BLD AUTO: 1.43 X10 (3) UL (ref 1.5–7.7)
NEUTROPHILS # BLD AUTO: 1.43 X10(3) UL (ref 1.5–7.7)
NEUTROPHILS NFR BLD AUTO: 47.7 %
NONHDLC SERPL-MCNC: 169 MG/DL (ref ?–130)
OSMOLALITY SERPL CALC.SUM OF ELEC: 288 MOSM/KG (ref 275–295)
PLATELET # BLD AUTO: 270 10(3)UL (ref 150–450)
POTASSIUM SERPL-SCNC: 4.7 MMOL/L (ref 3.5–5.1)
PROT SERPL-MCNC: 7.4 G/DL (ref 6.4–8.2)
RBC # BLD AUTO: 4.91 X10(6)UL
SODIUM SERPL-SCNC: 139 MMOL/L (ref 136–145)
TRIGL SERPL-MCNC: 74 MG/DL (ref 30–149)
TSI SER-ACNC: 2.25 MIU/ML (ref 0.36–3.74)
VLDLC SERPL CALC-MCNC: 14 MG/DL (ref 0–30)
WBC # BLD AUTO: 3 X10(3) UL (ref 4–11)

## 2024-04-19 PROCEDURE — 85025 COMPLETE CBC W/AUTO DIFF WBC: CPT

## 2024-04-19 PROCEDURE — 80053 COMPREHEN METABOLIC PANEL: CPT

## 2024-04-19 PROCEDURE — 80061 LIPID PANEL: CPT

## 2024-04-19 PROCEDURE — 36415 COLL VENOUS BLD VENIPUNCTURE: CPT

## 2024-04-19 PROCEDURE — 84443 ASSAY THYROID STIM HORMONE: CPT

## 2024-04-24 ENCOUNTER — OFFICE VISIT (OUTPATIENT)
Dept: RHEUMATOLOGY | Facility: CLINIC | Age: 69
End: 2024-04-24
Payer: MEDICARE

## 2024-04-24 VITALS
BODY MASS INDEX: 23 KG/M2 | DIASTOLIC BLOOD PRESSURE: 62 MMHG | OXYGEN SATURATION: 99 % | HEART RATE: 70 BPM | WEIGHT: 132.38 LBS | SYSTOLIC BLOOD PRESSURE: 106 MMHG | TEMPERATURE: 98 F | RESPIRATION RATE: 20 BRPM

## 2024-04-24 DIAGNOSIS — M81.0 AGE-RELATED OSTEOPOROSIS WITHOUT CURRENT PATHOLOGICAL FRACTURE: ICD-10-CM

## 2024-04-24 DIAGNOSIS — M15.9 PRIMARY OSTEOARTHRITIS INVOLVING MULTIPLE JOINTS: ICD-10-CM

## 2024-04-24 DIAGNOSIS — M05.79 RHEUMATOID ARTHRITIS INVOLVING MULTIPLE SITES WITH POSITIVE RHEUMATOID FACTOR (HCC): Primary | ICD-10-CM

## 2024-04-24 PROBLEM — M15.0 PRIMARY OSTEOARTHRITIS INVOLVING MULTIPLE JOINTS: Status: ACTIVE | Noted: 2024-04-24

## 2024-04-24 PROCEDURE — 99214 OFFICE O/P EST MOD 30 MIN: CPT | Performed by: INTERNAL MEDICINE

## 2024-04-24 RX ORDER — PREDNISONE 5 MG/1
TABLET ORAL
Qty: 30 TABLET | Refills: 0 | Status: SHIPPED | OUTPATIENT
Start: 2024-04-24

## 2024-04-24 NOTE — PROGRESS NOTES
RHEUMATOLOGY FOLLOW UP   Date of visit: 04/24/2024  ?  Chief Complaint   Patient presents with    Follow - Up     April f/u. Pt did fall on 4/22/2024. Did not go to the ER or UC. Left pinky finger is black and blue but is still able to bend it.        ASSESSMENT, DISCUSSION & PLAN   Assessment:  1. Rheumatoid arthritis involving multiple sites with positive rheumatoid factor (HCC)    2. Primary osteoarthritis involving multiple joints    3. Age-related osteoporosis without current pathological fracture        Discussion:  Ms. Nila Glass is a 70 yo woman with hx of seropositive RA as well as osteoporosis and osteoarthritis who has recently moved to the area and interested in transitioning care to new rheumatologist. She has had worsened joint stiffness and swelling particularly of the wrists bilaterally and interested in possibly changing her current regimen.     Last year, there was concern for possible infection due to abnormal knee tap. Follow up with orthopedics has excluded infection. She did get her booster covid vaccination with post-vaccination symptoms almost a week after vaccine. She was restarted on enbrel and methotrexate but had continued worsened joint pain and stiffness.  She has since been switching to orencia and for some reason, discontinued her methotrexate. At a prior visit, we restarted methotrexate, however has more recently been suffering from elevations in her LFTs. Despite holding med for almost a month, these are stable. I do not feel comfortable continuing/restarting methtorexate at this time.     Since that time, she has been started on Rinvoq and has been doing great overall. Denies any significant morning stiffness or joint pain at this time. Has not required any recent prednisone and feels better than she has in years.   Actually decreased dose of rinvoq every other day due to her concerns over high cholesterol and has tolerated decrease.  Does have scoliosis, mild, that I  recommended PT given her osteoporosis and risk of fracture but not interested in medication intervention.     She does have MGUS for which she is now following with hematology regularly.   She also had slightly worsened leukopenia which is likely related to the Rinvoq therapy. Recommended she be sure to follow with heme/onc.   She continues to have constipation, chronic since childhood. Strongly recommended that she push oral hydration since it seems like she does normally stay dehydrated.  She may also want to consider an over-the-counter enema which she used to do as a child.  She had colonoscopy which showed some diverticulosis as well as internal hemorrhoids.  Denies any history of diverticulitis.  More recently, she suffered a fall and broke her wrist which she is doing okay however now has some pinky finger issues and was told that there is a ligamental tear.  Plan for surgical intervention in February after her upcoming cruise.  Updated BMD still showed osteoporosis however,. she still would like to avoid rx intervention for osteoporosis for now. Recommended she continue calcium/vitamin d supplementation.      At this time, she is doing well from an RA standpoint.   She actually stopped her  rinvoq a few months ago due to persistent shingles as well as cold-like symptoms while traveling.  She has been off of the medication for about 2 months and despite holding this, she feels well overall.  She has no signs of active synovitis.  Will have her do a trial off of medication and see if her rheumatoid has gotten into remission.  Prednisone taper ordered for her in case she feels like she is flaring since she does have a long trip planned for the spring.  She does also have leftover rinvoq if needed.  We will have her get updated inflammatory markers with a neck set of labs.  Okay to keep follow-up in about 3 to 4 months.  Encouraged patient to reach out if symptoms worsen in the meantime.    Of note, patient did  fall and her left pinky finger is bruised.  Offered x-ray due to her history of osteoporosis, however patient declined.  Has very minimal pain/swelling on exam.    She was previously seen by Ortho hand and determined that she does have a tear in the left finger tendon.  This is likely the reason for the lack of strength in that finger and range of motion restriction.  It is not bothering her so she does not plan on pursuing any surgical intervention.    Patient verbalized understanding of above instructions. No further questions at this time.    Code selection for this visit was based on time spent (30min) on date of service in preparing to see the patient, obtaining and/or reviewing separately obtained history, performing a medically appropriate examination, counseling and educating the patient/family/caregiver, ordering medications or testing, referring and communicating with other healthcare providers, documenting clinical information in the E HR, independently interpreting results and communicating results to the patient/family/caregiver and care coordination with the patient's other providers.  ?  Plan:  Diagnoses and all orders for this visit:    Rheumatoid arthritis involving multiple sites with positive rheumatoid factor (HCC)  -     Sed Fred Meade (Automated) [E]; Future  -     C-Reactive Protein [E]; Future  -     predniSONE 5 MG Oral Tab; Take 20mg daily x 3 days, then 15mg daily x 3 days, then 10mg daily x 3 days, then 5mg daily x 3 days, then stop.    Primary osteoarthritis involving multiple joints    Age-related osteoporosis without current pathological fracture              Return in about 3 months (around 7/24/2024).  ?  HPI   Nila Glass is a 69 year old female with the following active problems who has a hx of seropostive RA on methotrexate and Enbrel. She transitioned care to me several months ago and presents for follow up. Her course was complicated by concern for knee infection.  She was then started on Orencia without improvement. She has since been on Rinvoq and presents for follow up today.     Previously on:  Plaquenil- had issues with eyes  Xeljanz by her PCP when first diagnosed - did not notice difference  Humira too short to know if it helped  Enbrel but was having persistent knee pain    Orencia- no improvement of symptoms.   Methotrexate- elevated LFTs so med had to be discontinued      Since her last visit, she has been doing okay.  Was having some blurred vision, has appt next week to follow up (soonest appt she could get). Denies pain in the eye. Feels like a film over the eyes. Does have hx of cataract sx years ago.     Stopped her rinvoq around 2 months ago (was on vacation, had persistent shingles and cough)   Despite being off her medication, she feels well overall.   Denies any other morning stiffness or swelling.     Has followed with ortho hand- has a tear in the 5th finger tendon. No plan for surgery at this point. Can get a pain down the side and up the wrist. Also noted on MRI to have extensive OA changes of the carpus and post-truamtic deformity of the distal radius and ulna   Suffered another fall last week, tripped on her driveway and landed with hands outstretched. Does have some left hand ring finger pain/bruising. States not debilitating.   Some right knee discomfort from the fall but is improving.     Denies recurrence of shingles.   Still dealing with residual cough- nonproductive. Not really taking anything. Had CXR   Prior ankle swelling resolved.  Trying to drink more water   Denies skin rashes    Increased krill oil. Has plans to walk more with warmer weather.     + dry eyes, feels worsened after cataract surgery (had done July), now using systane and helping.   Denies dry mouth. Did get two crowns done but no other dental issues.   Stable constipation- only once weekly BMs and with taking stool softeners- dulcolax and milk of mag. Denies recent hemorrhoids      Last colonoscopy was when in [place], around age 58.   Reminds me that she was previously on Forteo injections but had no difference in her BMD.   not interested in medication intervention at this time.  Has established care with oncology- Dr. Simons following for MGUS. Has to follow yearly (around June).     HPI from initial consultation  referred for rheumatologic evaluation due to transition of care since she moved to the area. She has long standing RA.      States she first had pain about 2013, and woke up with severe pain- couldn't walk or use arms/legs. States she was seen by PCP first, given a shot of steroids, symptoms improved but within a week, symptoms came back. Was seen by rheumatologist in [place]. Switched rheum over time due to initial one retiring.  Was started on combination of Enbrel and methotrexate about 7 years ago. Was on higher methotrexate dosing but had side effect- due to nausea. Improved with the lower dosing- has fluctuated between 3-4 tabs now. Was on folic acid but switched to leucovorin but denies specific reasoning.   Seems like she was on Xeljanz by her PCP but unclear. Seems to have talked about Humira instead of Enbrel.   Did try plaquenil but had blurred vision within a week of taking so medication was discontinued.     Initially affect her knees, has since had bilateral knee replacement.  Also affected her wrists bilaterally. Had nodule over the right wrist, also had 'loose tendon' on the right hand. Left wrist surgery in June 2020 but states did not help as much.   Still has stiffness of the hand and the wrist. Was told there was significant signs of RA.   Still has very sensitivity to the touch of the wrist itself. Has decreased  strength. Has not gone through OT or therapy due to concerns of pandemic.     Has setup PCP locally in [place].  Used to work as .      Still has knee pain as well bilaterally. Would go for drainage and states had 8 vials of  fluid drawn and told related to RA.     + morning stiffness improved with activity, felt worse in the left hand; lasts for at least 30 minutes. But still has some stiffness throughout the day. Stiffness returns after being seated/resting for a period of time.   Denies any new nodule formation  Hx of two toes that are  but not clear if due to RA  + hair loss/thinning attributed to methotrexate   + hx of borderline anemia since young age. Was following with hematologist was told there was an abnormal protein in blood. Had to go through bone marrow biopsy. Was told to follow every 6 months. Told no reason for intervention.   + hx of one early miscarriage, able to conceive afterwards and pregnancy relatively normal   + chronic constipation, no blood in stools; last colonoscopy was normal per pt. No diverticulosis/diverticulitis   + hx of plantar fasciitis, improved with in soles   + rare bloody nose, attributes to dryness     The patient denies oral or nasal ulcers, photosensitive rash, elevated or scarring rashes, Raynaud's phenomenon, prior renal or liver disease, or history of seizures. Denies hx of pericarditis or pleuritis.   No history of prior blood clot in the legs or lungs, strokes or ischemic phenomenon.  Denies nonhealing ulcers on the fingertips, trouble swallowing, or severe acid reflux.  The patient denies any history of uveitis, crampy abdominal pain, diarrhea, bloody stools,  nodular painful shin bruises, Achilles heel pain, psoriatic lesions, spooning or pitting of the nails, history of dactylitis.  There are no symptoms of severe dry eyes, dry mouth, or swelling of the cheeks or under the jawbone.   No fevers, chills, lymphadenopathy, night sweats, unexpected weight loss, easy bruising or bleeding.  Denies chronic sinus infections/disease.  Denies chronic cough or hemoptysis.   Denies obvious blood in the urine.     Family hx:  One niece with autoimmune renal disease   Possible RA in her  parents but they had arthritis   3 brothers - one with DM and sister with arthritis     Past Medical History:  Past Medical History:    Arthritis    Back pain    Blurred vision    Change in hair    Constipation    Disorder of thyroid    Frequent urination    Frequent use of laxatives    Hemorrhoids    Hypothyroidism    Leaking of urine    Osteoporosis    Pain in joints    Presence of other cardiac implants and grafts    Rheumatoid arthritis (HCC)    Sleep disturbance    Stool incontinence    Visual impairment    glasses/contacts    Wears glasses     Past Surgical History:  Past Surgical History:   Procedure Laterality Date    Appendectomy  over 40 years ago    Cataract Bilateral 07/2023    Colonoscopy  over 5 years ago    Forearm/wrist surgery unlisted Bilateral     Knee replacement surgery      Abby localization wire 1 site left (cpt=[id number])     hx of right knee surgical in 20's  Hx appy    Family History:  Family History   Problem Relation Age of Onset    Other (lung cancer) Mother     Other (lung cancer) Father     Arthritis Sister     Diabetes Brother        Social History:  Social History     Socioeconomic History    Marital status:    Tobacco Use    Smoking status: Never     Passive exposure: Yes    Smokeless tobacco: Never   Vaping Use    Vaping status: Never Used   Substance and Sexual Activity    Alcohol use: Not Currently     Comment: Don't drink because of RA meds I take    Drug use: Never     Medications:  Outpatient Medications Marked as Taking for the 4/24/24 encounter (Office Visit) with Karli Goode, DO   Medication Sig Dispense Refill    predniSONE 5 MG Oral Tab Take 20mg daily x 3 days, then 15mg daily x 3 days, then 10mg daily x 3 days, then 5mg daily x 3 days, then stop. 30 tablet 0    levothyroxine (SYNTHROID) 75 MCG Oral Tab Take 1 tablet (75 mcg total) by mouth daily. 90 tablet 3    Multiple Vitamins-Minerals (PRESERVISION AREDS 2 OR) Take by mouth.      Cholecalciferol (VITAMIN D) 50  MCG (2000 UT) Oral Cap       Turmeric Curcumin 500 MG Oral Cap Take by mouth.      Krill Oil 350 MG Oral Cap Take by mouth.      Calcium Carbonate-Vitamin D 250-125 MG-UNIT Oral Tab Take 1 tablet by mouth daily.      Multiple Vitamin (MULTI-VITAMIN DAILY OR) Take by mouth.       Modified Medications    No medications on file     Medications Discontinued During This Encounter   Medication Reason    albuterol (PROAIR HFA) 108 (90 Base) MCG/ACT Inhalation Aero Soln     benzonatate 100 MG Oral Cap     valACYclovir 500 MG Oral Tab     Upadacitinib ER (RINVOQ) 15 MG Oral Tablet 24 Hr      ?    Allergies:  Allergies   Allergen Reactions    Penicillins HIVES    Septra [Sulfamethoxazole W/Trimethoprim] OTHER (SEE COMMENTS)     Lowers white blood count    Sulfa Antibiotics OTHER (SEE COMMENTS)     Effects heart       ?  REVIEW OF SYSTEMS   ?  Review of Systems   Constitutional:  Positive for malaise/fatigue. Negative for chills, fever and weight loss.   HENT:  Positive for congestion and tinnitus (chronic). Negative for hearing loss.    Eyes:  Positive for blurred vision. Negative for pain and redness.   Respiratory:  Positive for cough. Negative for hemoptysis, sputum production and shortness of breath.    Cardiovascular:  Positive for palpitations (intermittent). Negative for chest pain and leg swelling.   Gastrointestinal:  Positive for constipation (better,chronic). Negative for abdominal pain, blood in stool, diarrhea, heartburn (hx of hiatla hernia) and nausea.   Genitourinary:  Positive for frequency and urgency. Negative for dysuria and hematuria.   Musculoskeletal:  Positive for back pain (stable/chronic/intermittent) and joint pain (relates to fall and not RA). Negative for myalgias and neck pain.   Skin:  Negative for itching and rash.   Neurological:  Negative for dizziness, tingling, weakness and headaches.   Endo/Heme/Allergies:  Negative for environmental allergies. Bruises/bleeds easily (chronic unchanged).    Psychiatric/Behavioral:  Negative for depression. The patient has insomnia (due to back pain). The patient is not nervous/anxious.      PHYSICAL EXAM   Today's Vitals:  Temperature Blood Pressure Heart Rate Resp Rate SpO2   Temp: 98.2 °F (36.8 °C) BP: 106/62 Pulse: 70 Resp: 20 SpO2: 99 % (RA)   ?  Current Weight Height BMI BSA Pain   Wt Readings from Last 1 Encounters:   04/24/24 132 lb 6.4 oz (60.1 kg)      Body mass index is 23.45 kg/m². Body surface area is 1.62 meters squared. Pain Score: 1 - (Mild)       Physical Exam  Vitals and nursing note reviewed.   Constitutional:       General: She is not in acute distress.     Appearance: Normal appearance. She is well-developed. She is not diaphoretic.   HENT:      Head: Normocephalic.   Eyes:      General: No scleral icterus.     Extraocular Movements: Extraocular movements intact.      Conjunctiva/sclera: Conjunctivae normal.   Neck:      Vascular: No JVD.      Trachea: No tracheal deviation.   Cardiovascular:      Rate and Rhythm: Normal rate and regular rhythm.      Heart sounds: Normal heart sounds. No murmur heard.  Pulmonary:      Effort: Pulmonary effort is normal. No respiratory distress.      Breath sounds: Normal breath sounds. No wheezing.   Musculoskeletal:         General: Deformity present. No swelling or tenderness.      Cervical back: Neck supple.      Comments: Diffuse OA changes of the hands b/l.  Chronic enlargement of MCPs; active synovitis over b/l thumb, index and middle. - resolved  Left wrist restricted since prior injury-no active tenderness/swelling  Left pinky flexion deformity nontender  No swelling, tenderness, redness or restriction of motion of the elbows, ankles, or joints of the feet.  Bilateral shoulders with full ROM  Bilateral knees without medial joint line tenderness, mild crepitus, no more effusions  Scoliosis noted with right scapula being more prominent than the left.  No spinous process tenderness.   Lymphadenopathy:       Cervical: No cervical adenopathy.   Skin:     General: Skin is warm and dry.      Findings: Erythema and rash present.      Comments: No lesions over right trap   Neurological:      Mental Status: She is alert and oriented to person, place, and time.      Cranial Nerves: No cranial nerve deficit.      Gait: Gait normal.   Psychiatric:         Mood and Affect: Mood normal.         Behavior: Behavior normal.       Routine Assessment of Patient Index Data       1. Please check the one that best answers the patients abilities at this time:  A. Dress yourself, including tying shoelaces and doing buttons?: Without any difficulty Dress Raw Score: 0     B. Get in and out of bed?: Without any difficulty Bed Raw Score: 0     C. Lift a full cup or glass to your mouth?: Without any difficulty Cup Raw Score: 0     D. Walk outdoors on flat ground?: Without any difficulty Walk Raw Score: 0     E. Wash and dry your entire body?: Without any difficulty Bathe Raw Score: 0     F. Bend down to  clothing from the floor?: Without any difficulty Bend Raw Score: 0     G. Turn regular faucets on and off?: Without any difficulty Faucet Raw Score: 0     H. Get in and out of a car, bus, train, or airplane?: Without any difficulty Vehicle Raw Score: 0     I. Walk two miles or three kilometers, if you wish?: Without any difficulty Miles Raw Score: 0     J. Participate in recreational activities and sports as you would like, if you wish?: With some difficulty Participate Raw Score: 1     Functional Status (FN) Pre-Score - Rows A-J: 1 A-J FN Converted Score: 0.3       K. Get a good night's sleep?: With much difficulty     L. Deal with feelings of anxiety or being nervous?: Without any difficulty     M. Deal with feelings of depression or feeling blue?: Without any difficulty         2. How much pain have you had because of your condition over the past week?:  Patient's Pain Tolerance (PN): 0.5         3. Considering all the ways in which  illness and health conditions may affect you at this time, how are you doing?:  Patient's Global Estimate (PTGE): 0         RAPID 3 Score and Severity:  RAPID 3 Cumulative Score: 0.8 Severity: Near Remission                 Radiology review:         Narrative   PROCEDURE:  XR DEXA BONE DENSITOMETRY (CPT=77080)     COMPARISON:  None.     INDICATIONS:    M81.0 Age-related osteoporosis without current pathological fracture     PATIENT STATED HISTORY: (As transcribed by Technologist)  Age related osteoporosis without current pathological fracture.          LUMBAR SPINE ANALYSIS RESULTS:      Bone mineral density (BMD) (g/cm2):  0.758    Lumbar T-Score:  -2.6      % young normals:  72      % age matched controls:  93      Change from prior spine examination:  n/a              TOTAL HIP ANALYSIS RESULTS:        Bone mineral density (BMD) (g/cm2):  0.684      Total Hip T-Score:  -2.1      % young normals:  73      % age matched controls:  90      Change from prior hip examination:  n/a              FEMORAL NECK ANALYSIS RESULTS:        Bone mineral density (BMD) (g/cm2):  0.597      Femoral neck T-Score:  -2.3      % young normals:  70      % age matched controls:  92      Change from prior hip examination:  n/a            ADDITIONAL FINDINGS:  No significant additional findings.           Impression   CONCLUSION:  Bone mineral density values for lumbar spine are compatible with the diagnosis of osteoporosis by WHO definition (T score less than -2.5). If therapy is initiated, a follow-up study in 1 year may aid in evaluation of therapeutic efficacy.           The World Health Organization has defined the following categories based on bone density:  Normal bone:  T-score better than -1  Osteopenia: T-score between -1 and -2.5  Osteoporosis:  T-score less than -2.5        LOCATION:  [facility]                Dictated by (CST): Hector Quiñonez MD on 6/05/2023 at 11:18 AM      Finalized by (CST): Hector Quiñonez MD on 6/05/2023 at  11:19 AM       PROCEDURE:  XR SHOULDER, COMPLETE (MIN 2 VIEWS), RIGHT (CPT=73030)          TECHNIQUE:  Multiple views were obtained.       COMPARISON:  None.       INDICATIONS:  M25.511 Right shoulder pain, unspecified chronicity       PATIENT STATED HISTORY: (As transcribed by Technologist)  Pt here for ortho consult. Pt having difficulty raising her right arm for 6 weeks. Pt c/o pain laterally. Hx Rheumatoid Arthritis.        FINDINGS:  No evidence of acute displaced fracture or dislocation.  Osteopenia.  Unremarkable soft tissues.                        Impression   CONCLUSION:  No evidence of acute displaced fracture or dislocation in the right shoulder.       Dictated by (CST): Merrill Pope MD on 1/06/2022 at 8:33 AM       Finalized by (CST): Merrill Pope MD on 1/06/2022 at 8:35 AM       05/2021  Bone density  Lumbar spine T score -2.5  Proximal femur T score -2.2  Total hip T score -2.2  FRAX: Major osteoporotic fracture 25.9%; hip 5.8%  Impression: Osteoporosis        Labs:  Lab Results   Component Value Date    WBC 3.0 (L) 04/19/2024    RBC 4.91 04/19/2024    HGB 14.7 04/19/2024    HCT 43.9 04/19/2024    .0 04/19/2024    MCV 89.4 04/19/2024    MCH 29.9 04/19/2024    MCHC 33.5 04/19/2024    RDW 14.3 04/19/2024    NEPRELIM 1.43 (L) 04/19/2024    NEPERCENT 47.7 04/19/2024    LYPERCENT 36.3 04/19/2024    MOPERCENT 12.0 04/19/2024    EOPERCENT 2.7 04/19/2024    BAPERCENT 1.3 04/19/2024    NE 1.43 (L) 04/19/2024    LYMABS 1.09 04/19/2024    MOABSO 0.36 04/19/2024    EOABSO 0.08 04/19/2024    BAABSO 0.04 04/19/2024     Lab Results   Component Value Date    GLU 89 04/19/2024    BUN 14 04/19/2024    BUNCREA 30.5 (H) 05/04/2021    CREATSERUM 0.73 04/19/2024    ANIONGAP 8 04/19/2024    GFRNAA 64 07/15/2022    GFRAA 73 07/15/2022    CA 9.5 04/19/2024    OSMOCALC 288 04/19/2024    ALKPHO 59 04/19/2024    AST 28 04/19/2024    ALT 24 04/19/2024    BILT 0.5 04/19/2024    TP 7.4 04/19/2024    ALB 4.1 04/19/2024     GLOBULIN 3.3 04/19/2024     04/19/2024    K 4.7 04/19/2024     04/19/2024    CO2 25.0 04/19/2024       Additional Labs:  01/2024  CMP grossly normal  CBC with WBC 3.0 otherwise normal  Vitamin D 66 normal  Ferritin and iron studies normal    On 8/2023  Lipid panel: Total cholesterol 275 elevated; HDL 97; triglycerides 66 normal;  elevated    11/2022  ESR 7 normal  CRP normal  Vitamin D 56.8    07/2022  ESR 9 normal  CRP normal    04/2022  Vit D 29  SPEP with M spike 0.6  CRP 0.41 borderline elevated   ESR 24  PTH normal   CMP with AST 46; ALT 65    03/2022  ESR 5 normal  CRP normal   CMP with  AST 44; ALT 76    02/2022  ESR 50 elevated  CRP 1.30 elevated     11/2021  ESR 57 elevated  CRP 3.20 elevated  SPEP with a clonal spike in gamma region; monoclonal IgG lambda; normal free light chain ratio     08/2021  ESR 44  CRP 1.14    05/2021  CRP 0.42  ESR 24    01/2021  Vit D 54  CRP normal   ESR 11    Records from previous rheum reviewed (scanned in chart)  RF ccp positive started on 5 tab methotrexate and Enbrel   Forteo May 2015  Previously tried humira xeljanz plaquenil  Belindaa listed as allergy   Hx RA, OA, MGUS and osteoporosis   Hx of Left knee aspiration   Increase 6 tab 8/2020    Karli Goode, DO  [facility]  04/24/2024

## 2024-05-08 ENCOUNTER — OFFICE VISIT (OUTPATIENT)
Dept: INTERNAL MEDICINE CLINIC | Facility: CLINIC | Age: 69
End: 2024-05-08
Payer: MEDICARE

## 2024-05-08 VITALS
TEMPERATURE: 98 F | HEIGHT: 64 IN | SYSTOLIC BLOOD PRESSURE: 112 MMHG | BODY MASS INDEX: 22.4 KG/M2 | HEART RATE: 70 BPM | DIASTOLIC BLOOD PRESSURE: 68 MMHG | OXYGEN SATURATION: 99 % | RESPIRATION RATE: 16 BRPM | WEIGHT: 131.19 LBS

## 2024-05-08 DIAGNOSIS — Z00.00 ENCOUNTER FOR ANNUAL HEALTH EXAMINATION: Primary | ICD-10-CM

## 2024-05-08 DIAGNOSIS — D12.3 BENIGN NEOPLASM OF TRANSVERSE COLON: ICD-10-CM

## 2024-05-08 DIAGNOSIS — M05.79 RHEUMATOID ARTHRITIS INVOLVING MULTIPLE SITES WITH POSITIVE RHEUMATOID FACTOR (HCC): ICD-10-CM

## 2024-05-08 DIAGNOSIS — K44.9 HIATAL HERNIA: ICD-10-CM

## 2024-05-08 DIAGNOSIS — E78.00 ELEVATED LDL CHOLESTEROL LEVEL: ICD-10-CM

## 2024-05-08 DIAGNOSIS — E03.9 ACQUIRED HYPOTHYROIDISM: ICD-10-CM

## 2024-05-08 DIAGNOSIS — M81.0 AGE-RELATED OSTEOPOROSIS WITHOUT CURRENT PATHOLOGICAL FRACTURE: ICD-10-CM

## 2024-05-08 DIAGNOSIS — K59.04 CHRONIC IDIOPATHIC CONSTIPATION: ICD-10-CM

## 2024-05-08 DIAGNOSIS — M15.9 PRIMARY OSTEOARTHRITIS INVOLVING MULTIPLE JOINTS: ICD-10-CM

## 2024-05-08 DIAGNOSIS — D47.2 MGUS (MONOCLONAL GAMMOPATHY OF UNKNOWN SIGNIFICANCE): ICD-10-CM

## 2024-05-08 PROCEDURE — G0439 PPPS, SUBSEQ VISIT: HCPCS | Performed by: INTERNAL MEDICINE

## 2024-05-08 PROCEDURE — 99214 OFFICE O/P EST MOD 30 MIN: CPT | Performed by: INTERNAL MEDICINE

## 2024-05-08 RX ORDER — AZITHROMYCIN 250 MG/1
TABLET, FILM COATED ORAL
Qty: 6 TABLET | Refills: 0 | Status: SHIPPED | OUTPATIENT
Start: 2024-05-08 | End: 2024-05-09 | Stop reason: ALTCHOICE

## 2024-05-08 RX ORDER — FLUTICASONE PROPIONATE 50 MCG
2 SPRAY, SUSPENSION (ML) NASAL DAILY
Qty: 1 EACH | Refills: 0 | Status: SHIPPED | OUTPATIENT
Start: 2024-05-08 | End: 2025-05-03

## 2024-05-08 NOTE — PROGRESS NOTES
Subjective:   Nila Glass is a 69 year old female who presents for a Medicare Subsequent Annual Wellness visit (Pt already had Initial Annual Wellness) and scheduled follow up of multiple significant but stable problems.     Since last evaluation the patient continues to experience a cough. She reports a three-week duration of a dry cough without shortness of breath, wheezing, or sputum production. Symptom-onset was following a suspected viral URI while on a cruise ship and has since persisted. She initially took azithromycin therapy, followed by oral steroid and inhaled michelle therapies, without improvement. Following recent evaluation by the rheumatology service she underwent a prolonged steroid taper x 12 days, without any improvement. CXR revealed hyperinflation suggestive of COPD; no personal history of tobacco use, however, ~18 years of second hand exposure from both parents. On further discussion she reports some mucous drainage triggering this cough, and also a history of hiatal hernia with infrequent GERD. No further acute concerns at this time.    History/Other:   Fall Risk Assessment:   She has been screened for Falls and is High Risk. Fall Prevention information provided to patient in After Visit Summary.    Do you feel unsteady when standing or walking?: Yes  Do you worry about falling?: Yes  Have you fallen in the past year?: Yes  How many times have you fallen?: (P) 2  Were you injured?: (P) No     Cognitive Assessment:   Abnormal  What day of the week is this?: Correct  What month is it?: Correct  What year is it?: Correct  Recall \"Ball\": Correct  Recall \"Flag\": Correct  Recall \"Tree\": Incorrect    Functional Ability/Status:   Nila Glass has some abnormal functions as listed below:  She has Vision problems based on screening of functional status. She has problems with Memory based on screening of functional status.       Depression Screening (PHQ-2/PHQ-9): PHQ-2 SCORE: 0  , done  5/1/2024        5 minutes spent screening and counseling for depression    Advanced Directives:   She does have a Living Will but we do NOT have it on file in Epic.    She does have a POA but we do NOT have it on file in Epic.    Discussed Advance Care Planning with patient (and family/surrogate if present). Standard forms made available to patient in After Visit Summary.      Patient Active Problem List   Diagnosis    Rheumatoid arthritis involving multiple sites (HCC)    Age-related osteoporosis without current pathological fracture    Chronic idiopathic constipation    Acquired hypothyroidism    MGUS (monoclonal gammopathy of unknown significance)    Special screening for malignant neoplasm of colon    Benign neoplasm of transverse colon    Primary osteoarthritis involving multiple joints     Allergies:  She is allergic to penicillins, septra [sulfamethoxazole w/trimethoprim], and sulfa antibiotics.    Current Medications:  Outpatient Medications Marked as Taking for the 5/8/24 encounter (Office Visit) with Anthony Soto MD   Medication Sig    azithromycin (ZITHROMAX Z-JOVANNA) 250 MG Oral Tab Take 2 tablets (500 mg total) by mouth daily for 1 day, THEN 1 tablet (250 mg total) daily for 4 days.    fluticasone propionate 50 MCG/ACT Nasal Suspension 2 sprays by Each Nare route daily.    levothyroxine (SYNTHROID) 75 MCG Oral Tab Take 1 tablet (75 mcg total) by mouth daily.    Multiple Vitamins-Minerals (PRESERVISION AREDS 2 OR) Take by mouth.    Cholecalciferol (VITAMIN D) 50 MCG (2000 UT) Oral Cap     Turmeric Curcumin 500 MG Oral Cap Take by mouth.    Krill Oil 350 MG Oral Cap Take by mouth.    Calcium Carbonate-Vitamin D 250-125 MG-UNIT Oral Tab Take 1 tablet by mouth daily.    Multiple Vitamin (MULTI-VITAMIN DAILY OR) Take by mouth.       Medical History:  She  has a past medical history of Arthritis (I don’t know), Back pain (occasionally), Blurred vision (sometimes), Change in hair (For several years now),  Constipation (On and off all my life), Disorder of thyroid, Frequent urination (All the time when I drink water frequently), Frequent use of laxatives (This week use Miralax), Hemorrhoids (occasionally), Hypothyroidism, Leaking of urine (Have to wear a panty shield), Osteoporosis (For at least 10 years), Pain in joints (Sometimes), Presence of other cardiac implants and grafts (Knee replacements), Rheumatoid arthritis (HCC), Sleep disturbance (Wake up on and off throughout the night), Stool incontinence (On and off all my life), Visual impairment, and Wears glasses (glasses).  Surgical History:  She  has a past surgical history that includes knee replacement surgery; appendectomy (over 40 years ago); colonoscopy (over 5 years ago); milli localization wire 1 site left (cpt=19281); cataract (Bilateral, 07/2023); and forearm/wrist surgery unlisted (Bilateral).   Family History:  Her family history includes Arthritis in her sister; Diabetes in her brother; lung cancer in her father and mother.  Social History:  She  reports that she has never smoked. She has been exposed to tobacco smoke. She has never used smokeless tobacco. She reports that she does not currently use alcohol. She reports that she does not use drugs.    Tobacco:  She has never smoked tobacco.    CAGE Alcohol Screen:   CAGE screening score of 0 on 5/1/2024, showing low risk of alcohol abuse.      Patient Care Team:  Anthony Soto MD as PCP - General (Internal Medicine)  Karli Goode DO (RHEUMATOLOGY)  Teresa Moreno PT as Physical Therapist  Marah Jenkins RN as Registered Nurse (Registered Nurse)    Review of Systems  Constitutional: negative  Eyes: negative  Ears, nose, mouth, throat, and face: negative  Respiratory: positive for cough  Cardiovascular: negative  Gastrointestinal: negative  Genitourinary:negative  Integument/breast: negative  Hematologic/lymphatic: negative  Musculoskeletal:negative  Neurological: negative  Behavioral/Psych:  negative  Endocrine: negative  Allergic/Immunologic: negative    Objective:   Physical Exam  General Appearance:  Alert, cooperative, no distress, appears stated age   Head:  Normocephalic, without obvious abnormality, atraumatic   Eyes:  BL conjunctiva WNL   Ears:  TM WNL BL   Nose: Deferred   Throat: Deferred   Neck: Supple, symmetrical, trachea midline, no adenopathy;  thyroid: not enlarged, symmetric, no tenderness/mass/nodules; no carotid bruit or JVD   Back:   Symmetric, no curvature, ROM normal, no CVA tenderness   Lungs:   Clear to auscultation bilaterally, respirations unlabored   Heart:  Regular rate and rhythm, S1 and S2 normal, no murmur, rub, or gallop   Abdomen:   Soft, non-tender, bowel sounds active all four quadrants,  no masses, no organomegaly   Pelvic: Deferred   Extremities: No edema   Pulses: 2+ and symmetric   Skin: Skin color, texture, turgor normal, no rashes or lesions   Lymph nodes: Cervical nodes normal   Neurologic: Grossly normal       /68 (BP Location: Left arm, Patient Position: Standing, Cuff Size: adult)   Pulse 70   Temp 97.5 °F (36.4 °C) (Skin)   Resp 16   Ht 5' 4\" (1.626 m)   Wt 131 lb 3.2 oz (59.5 kg)   SpO2 99%   BMI 22.52 kg/m²  Estimated body mass index is 22.52 kg/m² as calculated from the following:    Height as of this encounter: 5' 4\" (1.626 m).    Weight as of this encounter: 131 lb 3.2 oz (59.5 kg).    Medicare Hearing Assessment:   Hearing Screening    Time taken: 5/8/2024 12:39 PM  Entry User: Anthony Soto MD  Screening Method: Whisper Test  Whisper Test Result: Pass               Assessment & Plan:   Nila Glass is a 69 year old female who presents for a Medicare Assessment.     Outstanding screening and preventive measures:  Screening for breast cancer: mammogram ordered    Chronic cough:  Post-viral bronchospasm now less likely given 3 month duration, and unresponsiveness to oral steroid and inhaled michelle therapies  CXR: hyperinflation. May  benefit from spirometry evaluation, however, findings were not overly consistent with COPD.  Hiatal hernia with intermittent GERD: trial of H2 blocking therapy   Mucous drainage: trial of intranasal glucocorticoid therapy   Rheumatoid arthritis: possible ILD, however, steroid unresponsive   Referred to pulmonary service; if cannot be seen soon, will order CT chest    RA:  Stable  ESR and CRP prior to 6 mo follow-up with rheumatology service    Osteoarthritis of multiple joints:  Primary  Stable  Following with ortho and rheumatology services    Osteoporosis:  Following with rheumatology service    MGUS and leukopenia:  Stable  Following with hematology service    Hypothyroidism:  Stable and asymptomatic  Continue levothyroxine 75 mcg daily    Elevated LDL cholesterol:  Continue efforts to improve dietary and lifestyle habits  CMP and lipid panel in 3 months    Chronic idiopathic constipation:  Improving  No longer taking miralax    1. Encounter for annual health examination (Primary)  Other orders  -     Azithromycin; Take 2 tablets (500 mg total) by mouth daily for 1 day, THEN 1 tablet (250 mg total) daily for 4 days.  Dispense: 6 tablet; Refill: 0  -     Fluticasone Propionate; 2 sprays by Each Nare route daily.  Dispense: 1 each; Refill: 0  The patient indicates understanding of these issues and agrees to the plan.        Return in 6 months (on 11/8/2024).     Anthony Soto MD, 5/8/2024     Supplementary Documentation:   General Health:  In the past six months, have you lost more than 10 pounds without trying?: 2 - No  Has your appetite been poor?: No  Type of Diet: Balanced  How does the patient maintain a good energy level?: Daily Walks  How would you describe your daily physical activity?: Moderate  How would you describe your current health state?: Fair  How do you maintain positive mental well-being?: Games;Visiting Family  On a scale of 0 to 10, with 0 being no pain and 10 being severe pain, what is your  pain level?: 2 - (Mild)  In the past six months, have you experienced urine leakage?: 1-Yes  At any time do you feel concerned for the safety/well-being of yourself and/or your children, in your home or elsewhere?: No  Have you had any immunizations at another office such as Influenza, Hepatitis B, Tetanus, or Pneumococcal?: No       Nila Glass's SCREENING SCHEDULE   Tests on this list are recommended by your physician but may not be covered, or covered at this frequency, by your insurer.   Please check with your insurance carrier before scheduling to verify coverage.   PREVENTATIVE SERVICES FREQUENCY &  COVERAGE DETAILS LAST COMPLETION DATE   Diabetes Screening    Fasting Blood Sugar /  Glucose    One screening every 12 months if never tested or if previously tested but not diagnosed with pre-diabetes   One screening every 6 months if diagnosed with pre-diabetes Lab Results   Component Value Date    GLU 89 04/19/2024        Cardiovascular Disease Screening    Lipid Panel  Cholesterol  Lipoprotein (HDL)  Triglycerides Covered every 5 years for all Medicare beneficiaries without apparent signs or symptoms of cardiovascular disease Lab Results   Component Value Date    CHOLEST 254 (H) 04/19/2024    HDL 85 (H) 04/19/2024     (H) 04/19/2024    TRIG 74 04/19/2024         Electrocardiogram (EKG)   Covered if needed at Welcome to Medicare, and non-screening if indicated for medical reasons -      Ultrasound Screening for Abdominal Aortic Aneurysm (AAA) Covered once in a lifetime for one of the following risk factors    Men who are 65-75 years old and have ever smoked    Anyone with a family history -     Colorectal Cancer Screening  Covered for ages 50-85; only need ONE of the following:    Colonoscopy   Covered every 10 years    Covered every 2 years if patient is at high risk or previous colonoscopy was abnormal 05/24/2023    Health Maintenance   Topic Date Due    Colorectal Cancer Screening  05/24/2030        Flexible Sigmoidoscopy   Covered every 4 years -    Fecal Occult Blood Test Covered annually -   Bone Density Screening    Bone density screening    Covered every 2 years after age 65 if diagnosed with risk of osteoporosis or estrogen deficiency.    Covered yearly for long-term glucocorticoid medication use (Steroids) Last Dexa Scan:    XR DEXA BONE DENSITOMETRY (CPT=77080) 06/05/2023      No recommendations at this time   Pap and Pelvic    Pap   Covered every 2 years for women at normal risk; Annually if at high risk -  No recommendations at this time    Chlamydia Annually if high risk -  No recommendations at this time   Screening Mammogram    Mammogram     Recommend annually for all female patients aged 40 and older    One baseline mammogram covered for patients aged 35-39 06/05/2023    Health Maintenance   Topic Date Due    Mammogram  06/05/2024       Immunizations    Influenza Covered once per flu season  Please get every year 09/18/2023  No recommendations at this time    Pneumococcal Each vaccine ([id number] & [id number]) covered once after 65 Prevnar 13: 10/01/2017    Lpaeyjqpe42: 04/01/2019     No recommendations at this time    Hepatitis B One screening covered for patients with certain risk factors   -  No recommendations at this time    Tetanus Toxoid Not covered by Medicare Part B unless medically necessary (cut with metal); may be covered with your pharmacy prescription benefits -    Tetanus, Diptheria and Pertusis TD and TDaP Not covered by Medicare Part B -  No recommendations at this time    Zoster Not covered by Medicare Part B; may be covered with your pharmacy  prescription benefits -  No recommendations at this time

## 2024-05-08 NOTE — PATIENT INSTRUCTIONS
Nila Glass's SCREENING SCHEDULE   Tests on this list are recommended by your physician but may not be covered, or covered at this frequency, by your insurer.   Please check with your insurance carrier before scheduling to verify coverage.   PREVENTATIVE SERVICES FREQUENCY &  COVERAGE DETAILS LAST COMPLETION DATE   Diabetes Screening    Fasting Blood Sugar /  Glucose    One screening every 12 months if never tested or if previously tested but not diagnosed with pre-diabetes   One screening every 6 months if diagnosed with pre-diabetes Lab Results   Component Value Date    GLU 89 04/19/2024        Cardiovascular Disease Screening    Lipid Panel  Cholesterol  Lipoprotein (HDL)  Triglycerides Covered every 5 years for all Medicare beneficiaries without apparent signs or symptoms of cardiovascular disease Lab Results   Component Value Date    CHOLEST 254 (H) 04/19/2024    HDL 85 (H) 04/19/2024     (H) 04/19/2024    TRIG 74 04/19/2024         Electrocardiogram (EKG)   Covered if needed at Welcome to Medicare, and non-screening if indicated for medical reasons -      Ultrasound Screening for Abdominal Aortic Aneurysm (AAA) Covered once in a lifetime for one of the following risk factors   • Men who are 65-75 years old and have ever smoked   • Anyone with a family history -     Colorectal Cancer Screening  Covered for ages 50-85; only need ONE of the following:    Colonoscopy   Covered every 10 years    Covered every 2 years if patient is at high risk or previous colonoscopy was abnormal 05/24/2023    Health Maintenance   Topic Date Due   • Colorectal Cancer Screening  05/24/2030       Flexible Sigmoidoscopy   Covered every 4 years -    Fecal Occult Blood Test Covered annually -   Bone Density Screening    Bone density screening    Covered every 2 years after age 65 if diagnosed with risk of osteoporosis or estrogen deficiency.    Covered yearly for long-term glucocorticoid medication use (Steroids) Last  Dexa Scan:    XR DEXA BONE DENSITOMETRY (CPT=77080) 06/05/2023      No recommendations at this time   Pap and Pelvic    Pap   Covered every 2 years for women at normal risk; Annually if at high risk -  No recommendations at this time    Chlamydia Annually if high risk -  No recommendations at this time   Screening Mammogram    Mammogram     Recommend annually for all female patients aged 40 and older    One baseline mammogram covered for patients aged 35-39 06/05/2023    Health Maintenance   Topic Date Due   • Mammogram  06/05/2024       Immunizations    Influenza Covered once per flu season  Please get every year 09/18/2023  No recommendations at this time    Pneumococcal Each vaccine ([id number] & [id number]) covered once after 65 Prevnar 13: 10/01/2017    [id number]: 04/01/2019     No recommendations at this time    Hepatitis B One screening covered for patients with certain risk factors   -  No recommendations at this time    Tetanus Toxoid Not covered by Medicare Part B unless medically necessary (cut with metal); may be covered with your pharmacy prescription benefits -    Tetanus, Diptheria and Pertusis TD and TDaP Not covered by Medicare Part B -  No recommendations at this time    Zoster Not covered by Medicare Part B; may be covered with your pharmacy  prescription benefits -  No recommendations at this time

## 2024-05-09 ENCOUNTER — OFFICE VISIT (OUTPATIENT)
Facility: CLINIC | Age: 69
End: 2024-05-09
Payer: MEDICARE

## 2024-05-09 VITALS
HEIGHT: 64 IN | DIASTOLIC BLOOD PRESSURE: 68 MMHG | WEIGHT: 131 LBS | RESPIRATION RATE: 16 BRPM | HEART RATE: 88 BPM | OXYGEN SATURATION: 97 % | SYSTOLIC BLOOD PRESSURE: 118 MMHG | BODY MASS INDEX: 22.36 KG/M2 | TEMPERATURE: 97 F

## 2024-05-09 DIAGNOSIS — J30.9 ALLERGIC RHINITIS, UNSPECIFIED SEASONALITY, UNSPECIFIED TRIGGER: Primary | ICD-10-CM

## 2024-05-09 PROCEDURE — 99204 OFFICE O/P NEW MOD 45 MIN: CPT | Performed by: INTERNAL MEDICINE

## 2024-05-09 RX ORDER — FLUTICASONE PROPIONATE 50 MCG
2 SPRAY, SUSPENSION (ML) NASAL DAILY
Qty: 1 EACH | Refills: 0 | OUTPATIENT
Start: 2024-05-09 | End: 2025-05-04

## 2024-05-09 RX ORDER — AZELASTINE HYDROCHLORIDE 137 UG/1
1-2 SPRAY, METERED NASAL 2 TIMES DAILY
Qty: 1 EACH | Refills: 3 | Status: SHIPPED | OUTPATIENT
Start: 2024-05-09 | End: 2024-06-08

## 2024-05-09 NOTE — PROGRESS NOTES
Pulmonary/Critical Care/Sleep Medicine    Consult Note     PCP: Anthony Soto MD   Phone: [phone]   Fax: [phone]     Ref Provider: No ref. provider found     Chief Complaint   Patient presents with    Consult     Cough x3 months / CXR - 02/29/24       HPI  I had the pleasure of seeing Nila Glass who is a pleasant 69 year old female who presents for evaluation of     The patient states that they were on a cruise in February 2024. She had taken Z Pack cough did not go away, she has been fighting it. She took medrol dose pack with no relief. She has cough so hard that her muscles sore on the right lower chest.  Patient states that last, Sunday coughed up yellow mucous. She does have some nasal congestion, frequent throat clearing and sudden bouts of cough, denies runny nose. Denies GERD although she has hiatal hernia history.     She is in remission with rheumatoid arthritis.     She has been prescribed Flonase that she is yet to start        She has no pets     Cruised to [place] and [facility]       Hx of tobacco use: She  reports that she has never smoked. She has been exposed to tobacco smoke. She has never used smokeless tobacco.    Past Medical History:    Arthritis    Back pain    Blurred vision    Change in hair    Constipation    Disorder of thyroid    Frequent urination    Frequent use of laxatives    Hemorrhoids    Hypothyroidism    Leaking of urine    Osteoporosis    Pain in joints    Pneumonia due to organism    Presence of other cardiac implants and grafts    Rheumatoid arthritis (HCC)    Sleep disturbance    Stool incontinence    Visual impairment    glasses/contacts    Wears glasses      Past Surgical History:   Procedure Laterality Date    Appendectomy  over 40 years ago    Cataract Bilateral 07/2023    Colonoscopy  over 5 years ago    D & c  over 45 years ago    Forearm/wrist surgery unlisted Bilateral     Knee replacement surgery      Abby localization wire 1 site left  (cpt=19281)            Other surgical history  Wrists/hands 2 years ago    Tonsillectomy  over 60 years ago     Allergies   Allergen Reactions    Penicillins HIVES    Septra [Sulfamethoxazole W/Trimethoprim] OTHER (SEE COMMENTS)     Lowers white blood count    Sulfa Antibiotics OTHER (SEE COMMENTS)     Effects heart       Current Outpatient Medications   Medication Sig Dispense Refill    levothyroxine (SYNTHROID) 75 MCG Oral Tab Take 1 tablet (75 mcg total) by mouth daily. 90 tablet 3    Multiple Vitamins-Minerals (PRESERVISION AREDS 2 OR) Take by mouth.      Cholecalciferol (VITAMIN D) 50 MCG (2000 UT) Oral Cap       Turmeric Curcumin 500 MG Oral Cap Take by mouth.      Krill Oil 350 MG Oral Cap Take by mouth.      Calcium Carbonate-Vitamin D 250-125 MG-UNIT Oral Tab Take 1 tablet by mouth daily.      Multiple Vitamin (MULTI-VITAMIN DAILY OR) Take by mouth.      fluticasone propionate 50 MCG/ACT Nasal Suspension 2 sprays by Each Nare route daily. (Patient not taking: Reported on 2024) 1 each 0    predniSONE 5 MG Oral Tab Take 20mg daily x 3 days, then 15mg daily x 3 days, then 10mg daily x 3 days, then 5mg daily x 3 days, then stop. (Patient not taking: Reported on 2024) 30 tablet 0      Social History     Socioeconomic History    Marital status:    Tobacco Use    Smoking status: Never     Passive exposure: Yes    Smokeless tobacco: Never   Vaping Use    Vaping status: Never Used   Substance and Sexual Activity    Alcohol use: Not Currently     Comment: Don't drink because of RA meds I take    Drug use: Never      Immunization History   Administered Date(s) Administered    Covid-19 Vaccine Moderna 100 mcg/0.5 ml 2019    Covid-19 Vaccine Pfizer 30 mcg/0.3 ml 2021, 2021, 2021    Covid-19 Vaccine Pfizer Bivalent 30mcg/0.3mL 10/25/2022    Covid-19 Vaccine Pfizer Patrick-Sucrose 30 mcg/0.3 ml 2022    FLU VAC High Dose 65 YRS & Older PRSV Free (31174) 2023    FLUAD  High Dose 65 yr and older (17467) 10/16/2020    FLUZONE 6 months and older PFS 0.5 ml (09228) 2021, 10/09/2022    Fluvirin, 3 Years & >, Im 10/01/2020    Moderna Covid-19 Vaccine 50mcg/0.5ml 12yrs+ (3365-5334) 2023    Pneumococcal (Prevnar 13) 10/01/2017    Pneumovax 23 2018, 2019    Zoster Vaccine Recombinant Adjuvanted (Shingrix) 2021, 11/15/2021      Family History   Problem Relation Age of Onset    Other (lung cancer) Mother     Cancer Mother          of Lung Cancer    Other (lung cancer) Father     Cancer Father          of Lung Cancer    Arthritis Sister     Diabetes Brother         Review of Systems   Constitutional:  Negative for fatigue, fever and unexpected weight change.   HENT:  Positive for congestion and postnasal drip. Negative for mouth sores, nosebleeds, rhinorrhea, sore throat and trouble swallowing.    Eyes:  Negative for visual disturbance.   Respiratory:  Positive for cough and shortness of breath. Negative for apnea, choking, chest tightness and wheezing.    Cardiovascular:  Positive for chest pain (deep breathing on right). Negative for palpitations and leg swelling.   Gastrointestinal:  Negative for abdominal pain, constipation, diarrhea, nausea and vomiting.   Genitourinary:  Negative for difficulty urinating.   Musculoskeletal:  Positive for arthralgias. Negative for back pain, gait problem and myalgias.   Neurological:  Negative for dizziness, weakness and headaches.   Psychiatric/Behavioral:  Negative for sleep disturbance.        Vitals:    24 0826   BP: 118/68   Pulse: 88   Resp: 16   Temp: 97.3 °F (36.3 °C)      SpO2: 97 %  Ht Readings from Last 1 Encounters:   24 5' 4\" (1.626 m)     Wt Readings from Last 1 Encounters:   24 131 lb (59.4 kg)     Body mass index is 22.49 kg/m².     Physical Exam  Constitutional:       General: She is not in acute distress.     Appearance: Normal appearance. She is not ill-appearing or diaphoretic.    HENT:      Head: Normocephalic and atraumatic.      Nose: Nose normal. No congestion or rhinorrhea.      Comments: Very narrow right nares > left with edema and clear secretions     Mouth/Throat:      Mouth: Mucous membranes are moist.      Pharynx: Oropharynx is clear. No oropharyngeal exudate or posterior oropharyngeal erythema.   Eyes:      Extraocular Movements: Extraocular movements intact.      Pupils: Pupils are equal, round, and reactive to light.   Cardiovascular:      Rate and Rhythm: Normal rate.      Pulses: Normal pulses.      Heart sounds: Normal heart sounds. No murmur heard.  Pulmonary:      Effort: Pulmonary effort is normal. No respiratory distress.      Breath sounds: Normal breath sounds. No wheezing or rhonchi.   Chest:      Chest wall: No tenderness.   Abdominal:      General: Abdomen is flat. Bowel sounds are normal.      Palpations: Abdomen is soft.   Musculoskeletal:         General: Normal range of motion.   Skin:     General: Skin is warm.   Neurological:      General: No focal deficit present.      Mental Status: She is alert and oriented to person, place, and time.   Psychiatric:         Mood and Affect: Mood normal.         Behavior: Behavior normal.         Thought Content: Thought content normal.         Judgment: Judgment normal.             Labs:  Last BMP  Lab Results   Component Value Date    GLU 89 04/19/2024    BUN 14 04/19/2024    CREATSERUM 0.73 04/19/2024    BUNCREA 30.5 (H) 05/04/2021    ANIONGAP 8 04/19/2024    GFRAA 73 07/15/2022    GFRNAA 64 07/15/2022    CA 9.5 04/19/2024     04/19/2024    K 4.7 04/19/2024     04/19/2024    CO2 25.0 04/19/2024    OSMOCALC 288 04/19/2024      Last CBC  Lab Results   Component Value Date    WBC 3.0 (L) 04/19/2024    RBC 4.91 04/19/2024    HGB 14.7 04/19/2024    HCT 43.9 04/19/2024    MCV 89.4 04/19/2024    MCH 29.9 04/19/2024    MCHC 33.5 04/19/2024    RDW 14.3 04/19/2024    .0 04/19/2024      Last CMP  Lab Results    Component Value Date    GLU 89 04/19/2024    BUN 14 04/19/2024    BUNCREA 30.5 (H) 05/04/2021    CREATSERUM 0.73 04/19/2024    ANIONGAP 8 04/19/2024    GFRNAA 64 07/15/2022    GFRAA 73 07/15/2022    CA 9.5 04/19/2024    OSMOCALC 288 04/19/2024    ALKPHO 59 04/19/2024    AST 28 04/19/2024    ALT 24 04/19/2024    BILT 0.5 04/19/2024    TP 7.4 04/19/2024    ALB 4.1 04/19/2024    GLOBULIN 3.3 04/19/2024     04/19/2024    K 4.7 04/19/2024     04/19/2024    CO2 25.0 04/19/2024      Last Thyroid Function  Lab Results   Component Value Date    TSH 2.250 04/19/2024        Imaging:     PROCEDURE:  XR CHEST PA + LAT CHEST (JXI=24038) personally reviewed     INDICATIONS:  J06.9 Upper respiratory tract infection, unspecified type     COMPARISON:  None.     TECHNIQUE:  PA and lateral chest radiographs were obtained.     PATIENT STATED HISTORY: (As transcribed by Technologist)  Patient stated chest congestion.         FINDINGS:    Normal heart size and pulmonary vascularity.  Mildly large lung volumes.  No focal pulmonary opacity.  No pleural effusion.                   Impression   CONCLUSION:  Large lung volumes are suggestive of COPD.  No acute cardiopulmonary pathology.        LOCATION:  [facility]        Dictated by (CST): Damian Rodgers MD on 2/29/2024 at 3:50 PM             Impression:    Cough suspect due to rhinitis, postnasal drip and upper airway cough syndrome  Acute rhinitis  Allergic rhinitis with narrow edematous naris bilaterally  Hiatal Hernia   Rheumatoid arthritis: In remission  Hypothyroidism   Osteoporosis   Backache                              Plan:    Azelastine 137 mcg 1 puff twice daily as needed for congested and runny nose   Flonase 50 mcg  2 puffs each nostril daily  Normal saline OTC nasal spray 2 puffs 4 times daily as needed to prevent nasal dryness   Robitussin CF 2 teaspoon 4 times daily as needed for cough         Follow up:  2  months     Thank you for allowing me to participate in your  patient care.    JANETH Wu MD, FACP, FCCP, FAA - Pulmonary/Critical care/Sleep Medicine  Please contact our office if you have any questions or concerns at [phone]

## 2024-05-09 NOTE — PATIENT INSTRUCTIONS
Plan:    Azelastine 137 mcg 1 puff twice daily as needed for congested and runny nose   Flonase 50 mcg  2 puffs each nostril daily  Normal saline OTC nasal spray 2 puffs 4 times daily as needed to prevent nasal dryness   Robitussin CF 2 teaspoon 4 times daily as needed for cough         Follow up:  2  months       Lucas Wu MD      Allergic Rhinitis  Allergic rhinitis is an allergic reaction that affects the nose, and often the eyes. It’s often known as nasal allergies. Nasal allergies are often due to things in the environment that are breathed in. Depending what you are sensitive to, nasal allergies may occur only during certain seasons, or they may occur year round. Common indoor allergens include house dust mites, mold, cockroaches, and pet dander. Outdoor allergens include pollen from trees, grasses, and weeds.   Symptoms include a drippy, stuffy, and itchy nose. They also include sneezing and red and itchy eyes. You may feel tired more often. Severe allergies may also affect your breathing and trigger a condition called asthma.   Tests can be done to see what allergens are affecting you. You may be referred to an allergy specialist for testing and further evaluation.  Home care  Your healthcare provider may prescribe medicines to help relieve allergy symptoms. These may include oral medicines, nasal sprays, or eye drops.  Ask your provider for advice on how to stay away from substances that you are allergic to. Below are a few tips for each type of allergen.  Pet dander:  Do not have pets with fur and feathers.  If you have a pet, keep it out of your bedroom and off upholstered furniture.  Pollen:  When pollen counts are high, keep windows of your car and home closed. If possible, use an air conditioner instead.  Wear a filter mask when mowing or doing yard work.  House dust mites:  Wash bedding every week in warm water and detergent and dry on a hot setting.  Cover the mattress,  box spring, and pillows with allergy covers.   If possible, sleep in a room with no carpet, curtains, or upholstered furniture.  Cockroaches:  Store food in sealed containers.  Remove garbage from the home promptly.  Fix water leaks.  Mold:  Keep humidity low by using a dehumidifier or air conditioner. Keep the dehumidifier and air conditioner clean and free of mold.  Clean moldy areas with bleach and water. Don't mix bleach with other .  In general:  Vacuum once or twice a week. If possible, use a vacuum with a high-efficiency particulate air (HEPA) filter.  Don't smoke. Stay away from cigarette smoke. Cigarette smoke is an irritant that can make symptoms worse.  Follow-up care  Follow up as advised by the healthcare provider or our staff. If you were referred to an allergy specialist, make this appointment promptly.  When to seek medical advice  Call your healthcare provider or get medical care right away if the following occur:  Coughing  Fever of 100.4°F (38°C) or higher, or as directed by your healthcare provider  Raised red bumps (hives)  Continuing symptoms, new symptoms, or worsening symptoms  Call 911  Call 911 if you have:  Trouble breathing  Severe swelling of the face or severe itching of the eyes or mouth  Wheezing or shortness of breath  Chest tightness  Dizziness or lightheadedness  Feeling of doom  Stomach pain, bloating, vomiting, or diarrhea  Radha last reviewed this educational content on 10/1/2019  © 3660-0354 [employer]. All rights reserved. This information is not intended as a substitute for professional medical care. Always follow your healthcare professional's instructions.

## 2024-06-06 ENCOUNTER — HOSPITAL ENCOUNTER (OUTPATIENT)
Dept: MAMMOGRAPHY | Age: 69
Discharge: HOME | End: 2024-06-06
Attending: INTERNAL MEDICINE
Payer: MEDICARE

## 2024-06-06 DIAGNOSIS — Z12.31 ENCOUNTER FOR SCREENING MAMMOGRAM FOR MALIGNANT NEOPLASM OF BREAST: ICD-10-CM

## 2024-06-06 PROCEDURE — 77067 SCR MAMMO BI INCL CAD: CPT | Performed by: INTERNAL MEDICINE

## 2024-06-06 PROCEDURE — 77063 BREAST TOMOSYNTHESIS BI: CPT | Performed by: INTERNAL MEDICINE

## 2024-06-07 ENCOUNTER — PATIENT MESSAGE (OUTPATIENT)
Dept: HEMATOLOGY/ONCOLOGY | Facility: HOSPITAL | Age: 69
End: 2024-06-07

## 2024-06-07 DIAGNOSIS — D47.2 MGUS (MONOCLONAL GAMMOPATHY OF UNKNOWN SIGNIFICANCE): Primary | ICD-10-CM

## 2024-06-07 NOTE — TELEPHONE ENCOUNTER
From: Nila Glass  To: Brandon Simons  Sent: 6/7/2024 11:10 AM CDT  Subject: Upcoming appointment    Do I need labs for my upcoming appointment

## 2024-06-17 ENCOUNTER — LAB ENCOUNTER (OUTPATIENT)
Dept: LAB | Age: 69
End: 2024-06-17
Attending: INTERNAL MEDICINE

## 2024-06-17 DIAGNOSIS — D47.2 MGUS (MONOCLONAL GAMMOPATHY OF UNKNOWN SIGNIFICANCE): ICD-10-CM

## 2024-06-17 LAB
ALBUMIN SERPL-MCNC: 3.6 G/DL (ref 3.4–5)
ALBUMIN/GLOB SERPL: 1.2 {RATIO} (ref 1–2)
ALP LIVER SERPL-CCNC: 73 U/L
ALT SERPL-CCNC: 27 U/L
ANION GAP SERPL CALC-SCNC: 5 MMOL/L (ref 0–18)
AST SERPL-CCNC: 26 U/L (ref 15–37)
BASOPHILS # BLD AUTO: 0.03 X10(3) UL (ref 0–0.2)
BASOPHILS NFR BLD AUTO: 0.8 %
BILIRUB SERPL-MCNC: 0.3 MG/DL (ref 0.1–2)
BUN BLD-MCNC: 24 MG/DL (ref 9–23)
CALCIUM BLD-MCNC: 8.6 MG/DL (ref 8.5–10.1)
CHLORIDE SERPL-SCNC: 110 MMOL/L (ref 98–112)
CO2 SERPL-SCNC: 26 MMOL/L (ref 21–32)
CREAT BLD-MCNC: 0.93 MG/DL
EGFRCR SERPLBLD CKD-EPI 2021: 66 ML/MIN/1.73M2 (ref 60–?)
EOSINOPHIL # BLD AUTO: 0.18 X10(3) UL (ref 0–0.7)
EOSINOPHIL NFR BLD AUTO: 4.7 %
ERYTHROCYTE [DISTWIDTH] IN BLOOD BY AUTOMATED COUNT: 15.3 %
FASTING STATUS PATIENT QL REPORTED: YES
GLOBULIN PLAS-MCNC: 2.9 G/DL (ref 2.8–4.4)
GLUCOSE BLD-MCNC: 69 MG/DL (ref 70–99)
HCT VFR BLD AUTO: 40.7 %
HGB BLD-MCNC: 13 G/DL
IMM GRANULOCYTES # BLD AUTO: 0 X10(3) UL (ref 0–1)
IMM GRANULOCYTES NFR BLD: 0 %
LYMPHOCYTES # BLD AUTO: 1.16 X10(3) UL (ref 1–4)
LYMPHOCYTES NFR BLD AUTO: 30.2 %
MCH RBC QN AUTO: 29.1 PG (ref 26–34)
MCHC RBC AUTO-ENTMCNC: 31.9 G/DL (ref 31–37)
MCV RBC AUTO: 91.3 FL
MONOCYTES # BLD AUTO: 0.36 X10(3) UL (ref 0.1–1)
MONOCYTES NFR BLD AUTO: 9.4 %
NEUTROPHILS # BLD AUTO: 2.11 X10 (3) UL (ref 1.5–7.7)
NEUTROPHILS # BLD AUTO: 2.11 X10(3) UL (ref 1.5–7.7)
NEUTROPHILS NFR BLD AUTO: 54.9 %
OSMOLALITY SERPL CALC.SUM OF ELEC: 294 MOSM/KG (ref 275–295)
PLATELET # BLD AUTO: 251 10(3)UL (ref 150–450)
POTASSIUM SERPL-SCNC: 4 MMOL/L (ref 3.5–5.1)
PROT SERPL-MCNC: 6.5 G/DL (ref 6.4–8.2)
RBC # BLD AUTO: 4.46 X10(6)UL
SODIUM SERPL-SCNC: 141 MMOL/L (ref 136–145)
WBC # BLD AUTO: 3.8 X10(3) UL (ref 4–11)

## 2024-06-17 PROCEDURE — 80053 COMPREHEN METABOLIC PANEL: CPT

## 2024-06-17 PROCEDURE — 36415 COLL VENOUS BLD VENIPUNCTURE: CPT

## 2024-06-17 PROCEDURE — 86334 IMMUNOFIX E-PHORESIS SERUM: CPT

## 2024-06-17 PROCEDURE — 83521 IG LIGHT CHAINS FREE EACH: CPT

## 2024-06-17 PROCEDURE — 84165 PROTEIN E-PHORESIS SERUM: CPT

## 2024-06-17 PROCEDURE — 85025 COMPLETE CBC W/AUTO DIFF WBC: CPT

## 2024-06-21 LAB
ALBUMIN SERPL ELPH-MCNC: 3.93 G/DL (ref 3.75–5.21)
ALBUMIN/GLOB SERPL: 1.73 {RATIO} (ref 1–2)
ALPHA1 GLOB SERPL ELPH-MCNC: 0.23 G/DL (ref 0.19–0.46)
ALPHA2 GLOB SERPL ELPH-MCNC: 0.58 G/DL (ref 0.48–1.05)
B-GLOBULIN SERPL ELPH-MCNC: 0.6 G/DL (ref 0.68–1.23)
GAMMA GLOB SERPL ELPH-MCNC: 0.86 G/DL (ref 0.62–1.7)
KAPPA LC FREE SER-MCNC: 1.24 MG/DL (ref 0.33–1.94)
KAPPA LC FREE/LAMBDA FREE SER NEPH: 0.5 {RATIO} (ref 0.26–1.65)
LAMBDA LC FREE SERPL-MCNC: 2.47 MG/DL (ref 0.57–2.63)
M PROTEIN 1 SERPL ELPH-MCNC: 0.44 G/DL (ref ?–0)
PROT SERPL-MCNC: 6.2 G/DL (ref 5.7–8.2)

## 2024-06-24 ENCOUNTER — OFFICE VISIT (OUTPATIENT)
Dept: HEMATOLOGY/ONCOLOGY | Facility: HOSPITAL | Age: 69
End: 2024-06-24
Attending: INTERNAL MEDICINE

## 2024-06-24 VITALS
RESPIRATION RATE: 16 BRPM | HEART RATE: 71 BPM | OXYGEN SATURATION: 97 % | BODY MASS INDEX: 22.88 KG/M2 | TEMPERATURE: 98 F | DIASTOLIC BLOOD PRESSURE: 77 MMHG | SYSTOLIC BLOOD PRESSURE: 116 MMHG | WEIGHT: 134 LBS | HEIGHT: 64.02 IN

## 2024-06-24 DIAGNOSIS — D47.2 MGUS (MONOCLONAL GAMMOPATHY OF UNKNOWN SIGNIFICANCE): Primary | ICD-10-CM

## 2024-06-24 PROCEDURE — 99213 OFFICE O/P EST LOW 20 MIN: CPT | Performed by: INTERNAL MEDICINE

## 2024-06-24 NOTE — PROGRESS NOTES
Cancer Center Progress Note  Patient Name: Nila Glass   YOB: 1955   Medical Record Number: [id number]   CSN: [id number]   Attending Physician: Brandon Simons M.D.       Date of Visit: 2024        Chief Complaint:  Chief Complaint   Patient presents with    Follow - Up        Oncologic History:  Nila Glass is a 69 year old female referred for evaluation of a monoclonal IgG Lambda protein. The patient has rheumatoid arthritis, for which she has taken methotrexate weekly. That was replaced with Orencia in 2021.  She had no pain, other than her RA pain. No F/C/NS.  No palpable LAD. She underwent a full workup for this issue, at the [facility] in 2016. And been found to have MGUS. A review of those records reveals that her moat recent M-spike was 0.9 in 2020.     History of Present Illness:  Pt is here for follow up. No new complaints. No pain. No F/C/NS    Performance Status:  ECOG 0    Past Medical History:  Past Medical History:    Arthritis    Back pain    Blurred vision    Change in hair    Constipation    Disorder of thyroid    Frequent urination    Frequent use of laxatives    Hemorrhoids    Hypothyroidism    Leaking of urine    Osteoporosis    Pain in joints    Pneumonia due to organism    Presence of other cardiac implants and grafts    Rheumatoid arthritis (HCC)    Sleep disturbance    Stool incontinence    Visual impairment    glasses/contacts    Wears glasses       Past Surgical History:  Past Surgical History:   Procedure Laterality Date    Appendectomy  over 40 years ago    Cataract Bilateral 2023    Colonoscopy  over 5 years ago    D & c  over 45 years ago    Forearm/wrist surgery unlisted Bilateral     Knee replacement surgery      Abby localization wire 1 site left (cpt=19281) Left     usnure of date, benign.          Other surgical history  Wrists/hands 2 years ago    Tonsillectomy  over 60 years ago       Family  History:  Family History   Problem Relation Age of Onset    Other (lung cancer) Mother     Cancer Mother          of Lung Cancer    Other (lung cancer) Father     Cancer Father          of Lung Cancer    Arthritis Sister     Diabetes Brother        Social History:  Social History     Socioeconomic History    Marital status:      Spouse name: Not on file    Number of children: Not on file    Years of education: Not on file    Highest education level: Not on file   Occupational History    Occupation:    Tobacco Use    Smoking status: Never     Passive exposure: Yes    Smokeless tobacco: Never   Vaping Use    Vaping status: Never Used   Substance and Sexual Activity    Alcohol use: Not Currently     Comment: Don't drink because of RA meds I take    Drug use: Never    Sexual activity: Not on file   Other Topics Concern    Caffeine Concern Not Asked    Exercise Not Asked    Seat Belt Not Asked    Special Diet Not Asked    Stress Concern Not Asked    Weight Concern Not Asked   Social History Narrative    Not on file     Social Determinants of Health     Financial Resource Strain: Not on file   Food Insecurity: Not on file   Transportation Needs: Not on file   Physical Activity: Not on file   Stress: Not on file   Social Connections: Not on file   Housing Stability: Not on file       Current Medications:    Current Outpatient Medications:     fluticasone propionate 50 MCG/ACT Nasal Suspension, 2 sprays by Each Nare route daily. (Patient not taking: Reported on 2024), Disp: 1 each, Rfl: 0    predniSONE 5 MG Oral Tab, Take 20mg daily x 3 days, then 15mg daily x 3 days, then 10mg daily x 3 days, then 5mg daily x 3 days, then stop. (Patient not taking: Reported on 2024), Disp: 30 tablet, Rfl: 0    levothyroxine (SYNTHROID) 75 MCG Oral Tab, Take 1 tablet (75 mcg total) by mouth daily., Disp: 90 tablet, Rfl: 3    Multiple Vitamins-Minerals (PRESERVISION AREDS 2 OR), Take by mouth., Disp: ,  Rfl:     Cholecalciferol (VITAMIN D) 50 MCG (2000 UT) Oral Cap, , Disp: , Rfl:     Turmeric Curcumin 500 MG Oral Cap, Take by mouth., Disp: , Rfl:     Krill Oil 350 MG Oral Cap, Take by mouth., Disp: , Rfl:     Calcium Carbonate-Vitamin D 250-125 MG-UNIT Oral Tab, Take 1 tablet by mouth daily., Disp: , Rfl:     Multiple Vitamin (MULTI-VITAMIN DAILY OR), Take by mouth., Disp: , Rfl:     Allergies:  Allergies   Allergen Reactions    Penicillins HIVES    Septra [Sulfamethoxazole W/Trimethoprim] OTHER (SEE COMMENTS)     Lowers white blood count    Sulfa Antibiotics OTHER (SEE COMMENTS)     Effects heart          Review of Systems:    Constitutional No fevers, chills, night sweats, excessive fatigue or weight loss.   Eyes No significant visual difficulties. No diplopia. No yellowing of the eyes.   Hematologic/Lymphatic No easy bruising or bleeding.  No any tender or palpable lymph nodes.   Respiratory No dyspnea, Pleuritic chest pain, cough or hemoptysis.   Cardiovascular No anginal chest pain, palpitations or orthopnea.   Gastrointestinal No nausea, vomiting, diarrhea, GI bleeding, or constipation.   Genitorurinary ( No hematuria, dysuria, or incontinence. No abnormal bleeding.   Integumentary No rashes or yellowing of the skin   Neurologic No headache, blurred vision, and no areas of focal weakness. Normal gait.   Psychiatric No insomnia, depression, reji or mood swings.         Vital Signs:  /77 (BP Location: Left arm, Patient Position: Sitting, Cuff Size: adult)   Pulse 71   Temp 97.5 °F (36.4 °C) (Temporal)   Resp 16   Ht 1.626 m (5' 4.02\")   Wt 60.8 kg (134 lb)   SpO2 97%   BMI 22.99 kg/m²     Physical Examination:          Laboratory:   Latest Reference Range & Units 06/17/24 07:02   Glucose 70 - 99 mg/dL 69 (L)   Sodium 136 - 145 mmol/L 141   Potassium 3.5 - 5.1 mmol/L 4.0   Chloride 98 - 112 mmol/L 110   Carbon Dioxide, Total 21.0 - 32.0 mmol/L 26.0   BUN 9 - 23 mg/dL 24 (H)   CREATININE 0.55 -  1.02 mg/dL 0.93   CALCIUM 8.5 - 10.1 mg/dL 8.6   EGFR >=60 mL/min/1.73m2 66   ANION GAP 0 - 18 mmol/L 5   CALCULATED OSMOLALITY 275 - 295 mOsm/kg 294   ALKALINE PHOSPHATASE 55 - 142 U/L 73   AST (SGOT) 15 - 37 U/L 26   ALT (SGPT) 13 - 56 U/L 27   Total Bilirubin 0.1 - 2.0 mg/dL 0.3   Globulin 2.8 - 4.4 g/dL 2.9   (L): Data is abnormally low  (H): Data is abnormally high     Latest Reference Range & Units 06/17/24 07:02   A/G Ratio 1.0 - 2.0  1.2   KAPPA FREE LIGHT CHAIN 0.330 - 1.940 mg/dL 1.238   LAMBDA FREE LIGHT CHAIN 0.571 - 2.630 mg/dL 2.473   KAPPA/LAMBDA FLC RATIO 0.26 - 1.65  0.50   PROTEIN, TOTAL 5.7 - 8.2 g/dL  6.4 - 8.2 g/dL 6.2  6.5   Albumin 3.75 - 5.21 g/dL  3.4 - 5.0 g/dL 3.93  3.6   ALPHA-1-GLOBULINS 0.19 - 0.46 g/dL 0.23   ALPHA-2-GLOBULINS 0.48 - 1.05 g/dL 0.58   BETA GLOBULINS 0.68 - 1.23 g/dL 0.60 (L)   GAMMA GLOBULINS 0.62 - 1.70 g/dL 0.86   ALBUMIN/GLOBULIN RATIO 1.00 - 2.00  1.73   M-Jose <=0.00 g/dL 0.44 (H)   SPE INTERPRETATION  Monoclonal spike in the gamma region.    Reviewed by Cathryn Goldberg, M.D. Pathology 06/21/24 at 4:33 PM     IMMUNOFIXATION  Monoclonal IgG lambda. If clinically indicated, suggest 24 hour urine monoclonal       protein studies.        Reviewed by Cathryn Goldberg, M.D. Pathology 06/21/24 at 4:33 PM     (L): Data is abnormally low  (H): Data is abnormally high   Latest Reference Range & Units 06/17/24 07:02   WBC 4.0 - 11.0 x10(3) uL 3.8 (L)   Hemoglobin 12.0 - 16.0 g/dL 13.0   Hematocrit 35.0 - 48.0 % 40.7   Platelet Count 150.0 - 450.0 10(3)uL 251.0   RBC 3.80 - 5.30 x10(6)uL 4.46   MCH 26.0 - 34.0 pg 29.1   MCHC 31.0 - 37.0 g/dL 31.9   MCV 80.0 - 100.0 fL 91.3   RDW % 15.3   Prelim Neutrophil Abs 1.50 - 7.70 x10 (3) uL 2.11   Neutrophils Absolute 1.50 - 7.70 x10(3) uL 2.11   Lymphocytes Absolute 1.00 - 4.00 x10(3) uL 1.16   Monocytes Absolute 0.10 - 1.00 x10(3) uL 0.36   Eosinophils Absolute 0.00 - 0.70 x10(3) uL 0.18   Basophils Absolute 0.00 - 0.20 x10(3) uL  0.03   Immature Granulocyte Absolute 0.00 - 1.00 x10(3) uL 0.00   Neutrophils % % 54.9   Lymphocytes % % 30.2   Monocytes % % 9.4   Eosinophils % % 4.7   Basophils % % 0.8   Immature Granulocyte % % 0.0   (L): Data is abnormally low  Radiology:    Pathology:    Impression and Plan:  IgG Lambda MGUS: No evidence of end organ damage. Labs are stable. Follow up 1 year.    Leukopenia: Mild and stable. Continue to follow.    Planned Follow Up:  12 months        Electronically Signed by:    Brandon Simons M.D.  [facility]

## 2024-06-24 NOTE — PROGRESS NOTES
Pt here for follow up.   She would like to go over her labs.    Outpatient Oncology Care Plan  Problem list:  knowledge deficit    Problems related to:    disease/disease progression    Interventions:  provided general teaching    Expected outcomes:  understands plan of care    Progress towards outcome:  making progress    Education Record    Learner:  Patient  Barriers / Limitations:  None  Method:  Brief focused  Outcome:  Shows understanding  Comments:

## 2024-07-31 ENCOUNTER — LABORATORY ENCOUNTER (OUTPATIENT)
Dept: LAB | Age: 69
End: 2024-07-31
Attending: INTERNAL MEDICINE
Payer: MEDICARE

## 2024-07-31 DIAGNOSIS — M05.79 RHEUMATOID ARTHRITIS INVOLVING MULTIPLE SITES WITH POSITIVE RHEUMATOID FACTOR (HCC): ICD-10-CM

## 2024-07-31 LAB
CRP SERPL-MCNC: <0.4 MG/DL (ref ?–0.5)
ERYTHROCYTE [SEDIMENTATION RATE] IN BLOOD: 27 MM/HR

## 2024-07-31 PROCEDURE — 86140 C-REACTIVE PROTEIN: CPT

## 2024-07-31 PROCEDURE — 85652 RBC SED RATE AUTOMATED: CPT

## 2024-07-31 PROCEDURE — 36415 COLL VENOUS BLD VENIPUNCTURE: CPT

## 2024-08-06 ENCOUNTER — OFFICE VISIT (OUTPATIENT)
Dept: RHEUMATOLOGY | Facility: CLINIC | Age: 69
End: 2024-08-06
Payer: MEDICARE

## 2024-08-06 VITALS
HEIGHT: 63 IN | WEIGHT: 133 LBS | BODY MASS INDEX: 23.57 KG/M2 | DIASTOLIC BLOOD PRESSURE: 60 MMHG | RESPIRATION RATE: 16 BRPM | TEMPERATURE: 98 F | SYSTOLIC BLOOD PRESSURE: 106 MMHG | HEART RATE: 72 BPM | OXYGEN SATURATION: 99 %

## 2024-08-06 DIAGNOSIS — E55.9 VITAMIN D DEFICIENCY: ICD-10-CM

## 2024-08-06 DIAGNOSIS — Z79.899 IMMUNOCOMPROMISED STATE DUE TO DRUG THERAPY (HCC): ICD-10-CM

## 2024-08-06 DIAGNOSIS — R53.82 CHRONIC FATIGUE: ICD-10-CM

## 2024-08-06 DIAGNOSIS — M15.0 PRIMARY OSTEOARTHRITIS INVOLVING MULTIPLE JOINTS: ICD-10-CM

## 2024-08-06 DIAGNOSIS — D84.821 IMMUNOCOMPROMISED STATE DUE TO DRUG THERAPY (HCC): ICD-10-CM

## 2024-08-06 DIAGNOSIS — M81.0 AGE-RELATED OSTEOPOROSIS WITHOUT CURRENT PATHOLOGICAL FRACTURE: ICD-10-CM

## 2024-08-06 DIAGNOSIS — Z79.899 HIGH RISK MEDICATION USE: ICD-10-CM

## 2024-08-06 DIAGNOSIS — M05.79 RHEUMATOID ARTHRITIS INVOLVING MULTIPLE SITES WITH POSITIVE RHEUMATOID FACTOR (HCC): Primary | ICD-10-CM

## 2024-08-06 PROCEDURE — 99214 OFFICE O/P EST MOD 30 MIN: CPT | Performed by: INTERNAL MEDICINE

## 2024-08-06 RX ORDER — UPADACITINIB 15 MG/1
15 TABLET, EXTENDED RELEASE ORAL AS NEEDED
COMMUNITY

## 2024-08-06 RX ORDER — PREDNISONE 1 MG/1
2 TABLET ORAL DAILY
Qty: 180 TABLET | Refills: 0 | Status: SHIPPED | OUTPATIENT
Start: 2024-08-06 | End: 2024-11-04

## 2024-08-06 NOTE — PROGRESS NOTES
Two sample boxes of Rinvoq given at her office appointment    Expiration date: 7/19/2025  Lot number: [id number]

## 2024-08-06 NOTE — PROGRESS NOTES
RHEUMATOLOGY FOLLOW UP   Date of visit: 08/06/2024  ?  Chief Complaint   Patient presents with    Rheumatoid Arthritis     3 month f/u. Feeling good. Will have very occational joint pain and will use the rinvoq as needed. Uses prednisone with the very rare flares. Converted rapid score of 1.0       ASSESSMENT, DISCUSSION & PLAN   Assessment:  1. Rheumatoid arthritis involving multiple sites with positive rheumatoid factor (HCC)    2. Primary osteoarthritis involving multiple joints    3. Age-related osteoporosis without current pathological fracture    4. High risk medication use    5. Immunocompromised state due to drug therapy (HCC)    6. Chronic fatigue    7. Vitamin D deficiency          Discussion:  Ms. Nila Glass is a 68 yo woman with hx of seropositive RA as well as osteoporosis and osteoarthritis who has recently moved to the area and interested in transitioning care to new rheumatologist. She has had worsened joint stiffness and swelling particularly of the wrists bilaterally and interested in possibly changing her current regimen.     Last year, there was concern for possible infection due to abnormal knee tap. Follow up with orthopedics has excluded infection. She did get her booster covid vaccination with post-vaccination symptoms almost a week after vaccine. She was restarted on enbrel and methotrexate but had continued worsened joint pain and stiffness.  She has since been switching to orencia and for some reason, discontinued her methotrexate. At a prior visit, we restarted methotrexate, however had elevations in her LFTs. Despite holding med for almost a month, they were stable. I do not feel comfortable continuing/restarting methtorexate at this time.     Since that time, she has been started on Rinvoq and has been doing great overall. Denies any significant morning stiffness or joint pain and felt better than she has in years.   Actually decreased dose of rinvoq every other day due to  her concerns over high cholesterol and initially tolerated decrease.    She does have MGUS for which she is now following with hematology regularly.   She also had slightly worsened leukopenia which was thought to be likely related to the Rinvoq therapy. Recommended she continue to follow with heme/onc.   She continues to have constipation, chronic since childhood. Strongly recommended that she push oral hydration since it seems like she does normally stay dehydrated.  She may also want to consider an over-the-counter enema which she used to do as a child.  She had colonoscopy which showed some diverticulosis as well as internal hemorrhoids.  Denies any history of diverticulitis.  More recently, she suffered a fall and broke her wrist which she is doing okay however now has some pinky finger issues and was told that there is a ligamental tear.  Avoided sx intervention.   Updated BMD still showed osteoporosis however,. she still would like to avoid rx intervention for osteoporosis for now. Recommended she continue calcium/vitamin d supplementation.      At this time, she is doing well from an RA standpoint.   She continues off rinvoq- only taking as needed. Took prednisone once for a flare after traveling.   She lacks signs of synovitis on exam and inflammatory markers normal  She is under extreme stress with her 's recent cancer diagnosis and still not sure what plan of treatment will be  Despite the stress, she has not had shingles recurrence or major RA flare.  Recommended she try 1-2mg of prednisone daily rather than taking rinvoq sparingly (no definitive data about taking medication on prn basis).   Will need to monitor osteoporosis closely but pt still would like to proceed with low dose prednisone. May consider prn rinvoq since it has helped her vs resuming daily dosing.     Okay to keep follow-up in about 3 to 4 months.  Labs to be done at that time.   Encouraged patient to reach out if symptoms worsen  in the meantime.    Patient verbalized understanding of above instructions. No further questions at this time.    Code selection for this visit was based on time spent (30min) on date of service in preparing to see the patient, obtaining and/or reviewing separately obtained history, performing a medically appropriate examination, counseling and educating the patient/family/caregiver, ordering medications or testing, referring and communicating with other healthcare providers, documenting clinical information in the E HR, independently interpreting results and communicating results to the patient/family/caregiver and care coordination with the patient's other providers.  ?  Plan:  Diagnoses and all orders for this visit:    Rheumatoid arthritis involving multiple sites with positive rheumatoid factor (HCC)  -     predniSONE 1 MG Oral Tab; Take 2 tablets (2 mg total) by mouth daily.  -     CBC With Differential With Platelet; Future  -     Comp Metabolic Panel (14); Future  -     C-Reactive Protein; Future  -     Sed Rate, Westergren (Automated); Future  -     Vitamin D; Future    Primary osteoarthritis involving multiple joints    Age-related osteoporosis without current pathological fracture  -     Vitamin D; Future    High risk medication use  -     CBC With Differential With Platelet; Future  -     Comp Metabolic Panel (14); Future  -     C-Reactive Protein; Future  -     Sed Rate, Westergren (Automated); Future    Immunocompromised state due to drug therapy (HCC)  -     CBC With Differential With Platelet; Future  -     Comp Metabolic Panel (14); Future  -     C-Reactive Protein; Future  -     Sed Rate, Westergren (Automated); Future    Chronic fatigue    Vitamin D deficiency  -     Vitamin D; Future                Return in about 4 months (around 12/6/2024).  ?  HPI   Nila Glass is a 69 year old female with the following active problems who has a hx of seropostive RA on methotrexate and Enbrel. She  transitioned care to me several months ago and presents for follow up. Her course was complicated by concern for knee infection. She was then started on Orencia without improvement. She has since been on Rinvoq and presents for follow up today.     Previously on:  Plaquenil- had issues with eyes  Xeljanz by her PCP when first diagnosed - did not notice difference  Humira too short to know if it helped  Enbrel but was having persistent knee pain    Orencia- no improvement of symptoms.   Methotrexate- elevated LFTs so med had to be discontinued      Since her last visit, she has been doing well.  Had been doing well overall.   When she came [place] in [place], felt a flare. - took a few days of prednisone with improvement (taper wise)  States intermittently, she'll have some morning stiffness and takes rinvoq when that happens and responds well.  States can take up to once weekly but there are some weeks she goes without.   When pain present, primarily in the hands. Can get in the feet. States more ache rather than pain.  Denies obvious swelling.     Denies any recent falls or fractures.   Denies recurrence of knee pain   Still with bruising with blood draws.     Was having some blurred vision, told her macular degeneration. Does have hx of cataract sx years ago.     Reminds me previously seen by ortho hand- has a tear in the 5th finger tendon. No plan for surgery at this point. Also noted on MRI to have extensive OA changes of the carpus and post-truamtic deformity of the distal radius and ulna.     Denies recurrence of shingles.   Still dealing with residual cough- nonproductive. Not really taking anything. Had CXR. Using nasal spray.   Prior ankle swelling resolved.  Trying to drink more water     Continue krill oil. Trying to walk a least a mile a day.     + dry eyes, feels worsened after cataract surgery (had done July), now using systane and helping.   Denies dry mouth. Did get two crowns done but  no other dental issues.   Stable constipation- only once weekly BMs and with taking stool softeners- dulcolax and milk of mag. Denies recent hemorrhoids     Last colonoscopy was when in [place], around age 58.   Reminds me that she was previously on Forteo injections but had no difference in her BMD.   not interested in medication intervention at this time.  Has established care with oncology- Dr. Simons following for MGUS. Has to follow yearly (around June).     HPI from initial consultation  referred for rheumatologic evaluation due to transition of care since she moved to the area. She has long standing RA.      States she first had pain about 2013, and woke up with severe pain- couldn't walk or use arms/legs. States she was seen by PCP first, given a shot of steroids, symptoms improved but within a week, symptoms came back. Was seen by rheumatologist in [place]. Switched rheum over time due to initial one retiring.  Was started on combination of Enbrel and methotrexate about 7 years ago. Was on higher methotrexate dosing but had side effect- due to nausea. Improved with the lower dosing- has fluctuated between 3-4 tabs now. Was on folic acid but switched to leucovorin but denies specific reasoning.   Seems like she was on Xeljanz by her PCP but unclear. Seems to have talked about Humira instead of Enbrel.   Did try plaquenil but had blurred vision within a week of taking so medication was discontinued.     Initially affect her knees, has since had bilateral knee replacement.  Also affected her wrists bilaterally. Had nodule over the right wrist, also had 'loose tendon' on the right hand. Left wrist surgery in June 2020 but states did not help as much.   Still has stiffness of the hand and the wrist. Was told there was significant signs of RA.   Still has very sensitivity to the touch of the wrist itself. Has decreased  strength. Has not gone through OT or therapy due to concerns of pandemic.     Has setup  PCP locally in [place].  Used to work as .      Still has knee pain as well bilaterally. Would go for drainage and states had 8 vials of fluid drawn and told related to RA.     + morning stiffness improved with activity, felt worse in the left hand; lasts for at least 30 minutes. But still has some stiffness throughout the day. Stiffness returns after being seated/resting for a period of time.   Denies any new nodule formation  Hx of two toes that are  but not clear if due to RA  + hair loss/thinning attributed to methotrexate   + hx of borderline anemia since young age. Was following with hematologist was told there was an abnormal protein in blood. Had to go through bone marrow biopsy. Was told to follow every 6 months. Told no reason for intervention.   + hx of one early miscarriage, able to conceive afterwards and pregnancy relatively normal   + chronic constipation, no blood in stools; last colonoscopy was normal per pt. No diverticulosis/diverticulitis   + hx of plantar fasciitis, improved with in soles   + rare bloody nose, attributes to dryness     The patient denies oral or nasal ulcers, photosensitive rash, elevated or scarring rashes, Raynaud's phenomenon, prior renal or liver disease, or history of seizures. Denies hx of pericarditis or pleuritis.   No history of prior blood clot in the legs or lungs, strokes or ischemic phenomenon.  Denies nonhealing ulcers on the fingertips, trouble swallowing, or severe acid reflux.  The patient denies any history of uveitis, crampy abdominal pain, diarrhea, bloody stools,  nodular painful shin bruises, Achilles heel pain, psoriatic lesions, spooning or pitting of the nails, history of dactylitis.  There are no symptoms of severe dry eyes, dry mouth, or swelling of the cheeks or under the jawbone.   No fevers, chills, lymphadenopathy, night sweats, unexpected weight loss, easy bruising or bleeding.  Denies chronic sinus  infections/disease.  Denies chronic cough or hemoptysis.   Denies obvious blood in the urine.     Family hx:  One niece with autoimmune renal disease   Possible RA in her parents but they had arthritis   3 brothers - one with DM and sister with arthritis     Past Medical History:  Past Medical History:    Arthritis    Back pain    Blurred vision    Change in hair    Constipation    Disorder of thyroid    Frequent urination    Frequent use of laxatives    Hemorrhoids    Hypothyroidism    Leaking of urine    Osteoporosis    Pain in joints    Pneumonia due to organism    Presence of other cardiac implants and grafts    Rheumatoid arthritis (HCC)    Sleep disturbance    Stool incontinence    Visual impairment    glasses/contacts    Wears glasses     Past Surgical History:  Past Surgical History:   Procedure Laterality Date    Appendectomy  over 40 years ago    Cataract Bilateral 2023    Colonoscopy  over 5 years ago    D & c  over 45 years ago    Forearm/wrist surgery unlisted Bilateral     Knee replacement surgery      Abby localization wire 1 site left (cpt=[id number]) Left     usnure of date, benign.          Other surgical history  Wrists/hands 2 years ago    Tonsillectomy  over 60 years ago   hx of right knee surgical in 's  Hx appy    Family History:  Family History   Problem Relation Age of Onset    Other (lung cancer) Mother     Cancer Mother          of Lung Cancer    Other (lung cancer) Father     Cancer Father          of Lung Cancer    Arthritis Sister     Diabetes Brother        Social History:  Social History     Socioeconomic History    Marital status:    Occupational History    Occupation:    Tobacco Use    Smoking status: Never     Passive exposure: Yes    Smokeless tobacco: Never   Vaping Use    Vaping status: Never Used   Substance and Sexual Activity    Alcohol use: Not Currently     Comment: Don't drink because of RA meds I take    Drug use: Never      Medications:  Outpatient Medications Marked as Taking for the 8/6/24 encounter (Office Visit) with Karli Goode,    Medication Sig Dispense Refill    Upadacitinib ER (RINVOQ) 15 MG Oral Tablet 24 Hr Take 15 mg by mouth as needed.      predniSONE 1 MG Oral Tab Take 2 tablets (2 mg total) by mouth daily. 180 tablet 0    COLLAGEN OR Take by mouth.      azelastine 137 MCG/SPRAY Nasal Solution 1-2 sprays by Each Nare route 2 (two) times daily. Use as needed for congested or runny nose 1 each 3    levothyroxine (SYNTHROID) 75 MCG Oral Tab Take 1 tablet (75 mcg total) by mouth daily. 90 tablet 3    Multiple Vitamins-Minerals (PRESERVISION AREDS 2 OR) Take by mouth.      Cholecalciferol (VITAMIN D) 50 MCG (2000 UT) Oral Cap       Turmeric Curcumin 500 MG Oral Cap Take by mouth.      Krill Oil 350 MG Oral Cap Take by mouth.      Calcium Carbonate-Vitamin D 250-125 MG-UNIT Oral Tab Take 1 tablet by mouth daily.      Multiple Vitamin (MULTI-VITAMIN DAILY OR) Take by mouth.       Modified Medications    No medications on file     There are no discontinued medications.    ?    Allergies:  Allergies   Allergen Reactions    Penicillins HIVES    Septra [Sulfamethoxazole W/Trimethoprim] OTHER (SEE COMMENTS)     Lowers white blood count    Sulfa Antibiotics OTHER (SEE COMMENTS)     Effects heart       ?  REVIEW OF SYSTEMS   ?  Review of Systems   Constitutional:  Positive for malaise/fatigue. Negative for chills, fever and weight loss.   HENT:  Positive for congestion and tinnitus (chronic). Negative for hearing loss.    Eyes:  Positive for blurred vision. Negative for pain and redness.   Respiratory:  Positive for cough. Negative for hemoptysis, sputum production and shortness of breath.    Cardiovascular:  Positive for palpitations (intermittent). Negative for chest pain and leg swelling.   Gastrointestinal:  Positive for constipation (better,chronic). Negative for abdominal pain, blood in stool, diarrhea, heartburn (hx of  hiatla hernia) and nausea.   Genitourinary:  Positive for frequency and urgency. Negative for dysuria and hematuria.   Musculoskeletal:  Positive for back pain (stable/chronic/intermittent) and joint pain (relates to fall and not RA). Negative for myalgias and neck pain.   Skin:  Negative for itching and rash.   Neurological:  Negative for dizziness, tingling, weakness and headaches.   Endo/Heme/Allergies:  Negative for environmental allergies. Bruises/bleeds easily (chronic unchanged).   Psychiatric/Behavioral:  Negative for depression. The patient has insomnia (due to back pain). The patient is not nervous/anxious.      PHYSICAL EXAM   Today's Vitals:  Temperature Blood Pressure Heart Rate Resp Rate SpO2   Temp: 98.4 °F (36.9 °C) BP: 106/60 Pulse: 72 Resp: 16 SpO2: 99 %   ?  Current Weight Height BMI BSA Pain   Wt Readings from Last 1 Encounters:   08/06/24 133 lb (60.3 kg)    Height: 5' 3\" (160 cm) Body mass index is 23.56 kg/m². Body surface area is 1.63 meters squared.         Physical Exam  Vitals and nursing note reviewed.   Constitutional:       General: She is not in acute distress.     Appearance: Normal appearance. She is well-developed. She is not diaphoretic.   HENT:      Head: Normocephalic.   Eyes:      General: No scleral icterus.     Extraocular Movements: Extraocular movements intact.      Conjunctiva/sclera: Conjunctivae normal.   Neck:      Vascular: No JVD.      Trachea: No tracheal deviation.   Cardiovascular:      Rate and Rhythm: Normal rate and regular rhythm.      Heart sounds: Normal heart sounds. No murmur heard.  Pulmonary:      Effort: Pulmonary effort is normal. No respiratory distress.      Breath sounds: Normal breath sounds. No wheezing.   Musculoskeletal:         General: Deformity present. No swelling or tenderness.      Cervical back: Neck supple.      Comments: Diffuse OA changes of the hands b/l.  Chronic enlargement of MCPs; active synovitis over b/l thumb, index and middle. -  resolved  Left wrist restricted since prior injury-no active tenderness/swelling  Left pinky flexion deformity nontender  No swelling, tenderness, redness or restriction of motion of the elbows, ankles, or joints of the feet.  Bilateral shoulders with full ROM  Bilateral knees without medial joint line tenderness, mild crepitus, no more effusions  Scoliosis noted with right scapula being more prominent than the left.  No spinous process tenderness.   Lymphadenopathy:      Cervical: No cervical adenopathy.   Skin:     General: Skin is warm and dry.      Findings: No erythema or rash.   Neurological:      Mental Status: She is alert and oriented to person, place, and time.      Cranial Nerves: No cranial nerve deficit.      Gait: Gait normal.   Psychiatric:         Mood and Affect: Mood normal.         Behavior: Behavior normal.       Routine Assessment of Patient Index Data       1. Please check the one that best answers the patients abilities at this time:  A. Dress yourself, including tying shoelaces and doing buttons?: Without any difficulty Dress Raw Score: 0     B. Get in and out of bed?: Without any difficulty Bed Raw Score: 0     C. Lift a full cup or glass to your mouth?: Without any difficulty Cup Raw Score: 0     D. Walk outdoors on flat ground?: Without any difficulty Walk Raw Score: 0     E. Wash and dry your entire body?: Without any difficulty Bathe Raw Score: 0     F. Bend down to  clothing from the floor?: Without any difficulty Bend Raw Score: 0     G. Turn regular faucets on and off?: Without any difficulty Faucet Raw Score: 0     H. Get in and out of a car, bus, train, or airplane?: Without any difficulty Vehicle Raw Score: 0     I. Walk two miles or three kilometers, if you wish?: Without any difficulty Miles Raw Score: 0     J. Participate in recreational activities and sports as you would like, if you wish?: With much difficulty Participate Raw Score: 2     Functional Status (FN)  Pre-Score - Rows A-J: 2 A-J FN Converted Score: 0.7       K. Get a good night's sleep?: With some difficulty     L. Deal with feelings of anxiety or being nervous?: Without any difficulty     M. Deal with feelings of depression or feeling blue?: Without any difficulty         2. How much pain have you had because of your condition over the past week?:  Patient's Pain Tolerance (PN): 1.0         3. Considering all the ways in which illness and health conditions may affect you at this time, how are you doing?:  Patient's Global Estimate (PTGE): 1.5         RAPID 3 Score and Severity:  RAPID 3 Cumulative Score: 3.2 Severity: Near Remission                 Radiology review:         Narrative   PROCEDURE:  XR DEXA BONE DENSITOMETRY (CPT=77080)     COMPARISON:  None.     INDICATIONS:    M81.0 Age-related osteoporosis without current pathological fracture     PATIENT STATED HISTORY: (As transcribed by Technologist)  Age related osteoporosis without current pathological fracture.          LUMBAR SPINE ANALYSIS RESULTS:      Bone mineral density (BMD) (g/cm2):  0.758    Lumbar T-Score:  -2.6      % young normals:  72      % age matched controls:  93      Change from prior spine examination:  n/a              TOTAL HIP ANALYSIS RESULTS:        Bone mineral density (BMD) (g/cm2):  0.684      Total Hip T-Score:  -2.1      % young normals:  73      % age matched controls:  90      Change from prior hip examination:  n/a              FEMORAL NECK ANALYSIS RESULTS:        Bone mineral density (BMD) (g/cm2):  0.597      Femoral neck T-Score:  -2.3      % young normals:  70      % age matched controls:  92      Change from prior hip examination:  n/a            ADDITIONAL FINDINGS:  No significant additional findings.           Impression   CONCLUSION:  Bone mineral density values for lumbar spine are compatible with the diagnosis of osteoporosis by WHO definition (T score less than -2.5). If therapy is initiated, a follow-up study  in 1 year may aid in evaluation of therapeutic efficacy.           The World Health Organization has defined the following categories based on bone density:  Normal bone:  T-score better than -1  Osteopenia: T-score between -1 and -2.5  Osteoporosis:  T-score less than -2.5        LOCATION:  [id number]                Dictated by (CST): Hector Quiñonez MD on 6/05/2023 at 11:18 AM      Finalized by (CST): Hector Quiñonez MD on 6/05/2023 at 11:19 AM       PROCEDURE:  XR SHOULDER, COMPLETE (MIN 2 VIEWS), RIGHT (CPT=73030)          TECHNIQUE:  Multiple views were obtained.       COMPARISON:  None.       INDICATIONS:  M25.511 Right shoulder pain, unspecified chronicity       PATIENT STATED HISTORY: (As transcribed by Technologist)  Pt here for ortho consult. Pt having difficulty raising her right arm for 6 weeks. Pt c/o pain laterally. Hx Rheumatoid Arthritis.        FINDINGS:  No evidence of acute displaced fracture or dislocation.  Osteopenia.  Unremarkable soft tissues.                        Impression   CONCLUSION:  No evidence of acute displaced fracture or dislocation in the right shoulder.       Dictated by (CST): Merrill Pope MD on 1/06/2022 at 8:33 AM       Finalized by (CST): Merrill Pope MD on 1/06/2022 at 8:35 AM       05/2021  Bone density  Lumbar spine T score -2.5  Proximal femur T score -2.2  Total hip T score -2.2  FRAX: Major osteoporotic fracture 25.9%; hip 5.8%  Impression: Osteoporosis        Labs:  Lab Results   Component Value Date    WBC 3.8 (L) 06/17/2024    RBC 4.46 06/17/2024    HGB 13.0 06/17/2024    HCT 40.7 06/17/2024    .0 06/17/2024    MCV 91.3 06/17/2024    MCH 29.1 06/17/2024    MCHC 31.9 06/17/2024    RDW 15.3 06/17/2024    NEPRELIM 2.11 06/17/2024    NEPERCENT 54.9 06/17/2024    LYPERCENT 30.2 06/17/2024    MOPERCENT 9.4 06/17/2024    EOPERCENT 4.7 06/17/2024    BAPERCENT 0.8 06/17/2024    NE 2.11 06/17/2024    LYMABS 1.16 06/17/2024    MOABSO 0.36 06/17/2024    EOABSO 0.18  06/17/2024    BAABSO 0.03 06/17/2024     Lab Results   Component Value Date    GLU 69 (L) 06/17/2024    BUN 24 (H) 06/17/2024    BUNCREA 30.5 (H) 05/04/2021    CREATSERUM 0.93 06/17/2024    ANIONGAP 5 06/17/2024    GFRNAA 64 07/15/2022    GFRAA 73 07/15/2022    CA 8.6 06/17/2024    OSMOCALC 294 06/17/2024    ALKPHO 73 06/17/2024    AST 26 06/17/2024    ALT 27 06/17/2024    BILT 0.3 06/17/2024    TP 6.5 06/17/2024    TP 6.2 06/17/2024    ALB 3.6 06/17/2024    ALB 3.93 06/17/2024    GLOBULIN 2.9 06/17/2024     06/17/2024    K 4.0 06/17/2024     06/17/2024    CO2 26.0 06/17/2024       Additional Labs:  07/2024  ESR 27 normal  CRP normal     01/2024  CMP grossly normal  CBC with WBC 3.0 otherwise normal  Vitamin D 66 normal  Ferritin and iron studies normal    On 8/2023  Lipid panel: Total cholesterol 275 elevated; HDL 97; triglycerides 66 normal;  elevated    11/2022  ESR 7 normal  CRP normal  Vitamin D 56.8    07/2022  ESR 9 normal  CRP normal    04/2022  Vit D 29  SPEP with M spike 0.6  CRP 0.41 borderline elevated   ESR 24  PTH normal   CMP with AST 46; ALT 65    03/2022  ESR 5 normal  CRP normal   CMP with  AST 44; ALT 76    02/2022  ESR 50 elevated  CRP 1.30 elevated     11/2021  ESR 57 elevated  CRP 3.20 elevated  SPEP with a clonal spike in gamma region; monoclonal IgG lambda; normal free light chain ratio     08/2021  ESR 44  CRP 1.14    05/2021  CRP 0.42  ESR 24    01/2021  Vit D 54  CRP normal   ESR 11    Records from previous rheum reviewed (scanned in chart)  RF ccp positive started on 5 tab methotrexate and Enbrel   Forteo May 2015  Previously tried humira xeljanz plaquenil  Sza listed as allergy   Hx RA, OA, MGUS and osteoporosis   Hx of Left knee aspiration   Increase 6 tab 8/2020    Karli Goode DO  [facility]  08/06/2024

## 2024-08-07 PROBLEM — D84.821 IMMUNOCOMPROMISED STATE DUE TO DRUG THERAPY (HCC): Status: ACTIVE | Noted: 2024-08-07

## 2024-08-07 PROBLEM — Z79.899 IMMUNOCOMPROMISED STATE DUE TO DRUG THERAPY (HCC): Status: ACTIVE | Noted: 2024-08-07

## 2024-08-22 ENCOUNTER — PATIENT MESSAGE (OUTPATIENT)
Dept: RHEUMATOLOGY | Facility: CLINIC | Age: 69
End: 2024-08-22

## 2024-08-22 DIAGNOSIS — M05.79 RHEUMATOID ARTHRITIS INVOLVING MULTIPLE SITES WITH POSITIVE RHEUMATOID FACTOR (HCC): Primary | ICD-10-CM

## 2024-08-22 RX ORDER — UPADACITINIB 15 MG/1
15 TABLET, EXTENDED RELEASE ORAL AS NEEDED
Qty: 30 TABLET | Refills: 3 | Status: SHIPPED | OUTPATIENT
Start: 2024-08-22 | End: 2024-08-23

## 2024-08-23 RX ORDER — UPADACITINIB 15 MG/1
15 TABLET, EXTENDED RELEASE ORAL AS NEEDED
Qty: 60 TABLET | Refills: 0 | Status: SHIPPED | OUTPATIENT
Start: 2024-08-23

## 2024-09-21 ENCOUNTER — OFFICE VISIT (OUTPATIENT)
Dept: FAMILY MEDICINE CLINIC | Facility: CLINIC | Age: 69
End: 2024-09-21
Payer: MEDICARE

## 2024-09-21 VITALS
DIASTOLIC BLOOD PRESSURE: 70 MMHG | HEIGHT: 63 IN | TEMPERATURE: 98 F | RESPIRATION RATE: 16 BRPM | WEIGHT: 135 LBS | SYSTOLIC BLOOD PRESSURE: 108 MMHG | BODY MASS INDEX: 23.92 KG/M2 | OXYGEN SATURATION: 95 % | HEART RATE: 78 BPM

## 2024-09-21 DIAGNOSIS — J02.9 SORE THROAT: Primary | ICD-10-CM

## 2024-09-21 LAB
CONTROL LINE PRESENT WITH A CLEAR BACKGROUND (YES/NO): YES YES/NO
KIT LOT #: NORMAL NUMERIC
STREP GRP A CUL-SCR: NEGATIVE

## 2024-09-21 PROCEDURE — 99213 OFFICE O/P EST LOW 20 MIN: CPT | Performed by: FAMILY MEDICINE

## 2024-09-21 PROCEDURE — 87635 SARS-COV-2 COVID-19 AMP PRB: CPT | Performed by: FAMILY MEDICINE

## 2024-09-21 PROCEDURE — 87880 STREP A ASSAY W/OPTIC: CPT | Performed by: FAMILY MEDICINE

## 2024-09-21 NOTE — PATIENT INSTRUCTIONS
Your testing will be back in 24-36 hours.    Use OTC meds for comfort as needed--  Ibuprofen/Tylenol for fever/pain  Use Benadryl at bedtime to reduce drainage and promote rest.  Zyrtec/Claritin/Allegra in the AM to reduce nasal drainage without sedation.   Use saline nasal sprays to reduce congestion and thin secretions.   Use Delsym for cough.   Consider applying sara's vapo-rub or eucayptus oil to chest and feet at bedtime to reduce chest and nasal congestion.   Warm tea with honey, cough lozenges, vaporizers/steam etc.    Since your symptoms are very recent, you would benefit from some preventive supplements.   Take vit C 1000mg 3-4 times daily x 7 days  Zinc lozenges (cold-eeze, zicam) 4-5 times daily x 7 days  Vit D 5000IU daily  Take probiotics daily-- or double if you're already taking.     If no better in 2-3 days, follow-up with your PCP for further evaluation.

## 2024-09-21 NOTE — PROGRESS NOTES
CHIEF COMPLAINT:     Chief Complaint   Patient presents with    Sore Throat     This morning, sore throat  OTC dayquill, cough drops,          HPI:   Nila Glass is a 69 year old female presents to clinic with complaint of sore throat. Patient has had since this morning.  Patient  denies congestion, cough, fever, headache, nausea, rash. Had episode of headache and chills 2 days ago, but that resolved quickly.  Has no recent history of strep throat. No one is sick at home.   is undergoing treatments at cancer center for head/neck cancer. Pt is in and out of the hospital daily. Pt is concerned about strep and covid due to her 's health.  Treating symptoms with dayquil, cough drops. .    Current Outpatient Medications   Medication Sig Dispense Refill    Upadacitinib ER (RINVOQ) 15 MG Oral Tablet 24 Hr Take 15 mg by mouth as needed. 60 tablet 0    predniSONE 1 MG Oral Tab Take 2 tablets (2 mg total) by mouth daily. 180 tablet 0    COLLAGEN OR Take by mouth.      azelastine 137 MCG/SPRAY Nasal Solution 1-2 sprays by Each Nare route 2 (two) times daily. Use as needed for congested or runny nose 1 each 3    levothyroxine (SYNTHROID) 75 MCG Oral Tab Take 1 tablet (75 mcg total) by mouth daily. 90 tablet 3    Multiple Vitamins-Minerals (PRESERVISION AREDS 2 OR) Take by mouth.      Cholecalciferol (VITAMIN D) 50 MCG (2000 UT) Oral Cap       Turmeric Curcumin 500 MG Oral Cap Take by mouth.      Krill Oil 350 MG Oral Cap Take by mouth.      Calcium Carbonate-Vitamin D 250-125 MG-UNIT Oral Tab Take 1 tablet by mouth daily.      Multiple Vitamin (MULTI-VITAMIN DAILY OR) Take by mouth.       No current facility-administered medications for this visit.      Past Medical History:    Arthritis    Back pain    Blurred vision    Change in hair    Constipation    Disorder of thyroid    Frequent urination    Frequent use of laxatives    Hemorrhoids    Hypothyroidism    Leaking of urine    Osteoporosis    Pain in  joints    Pneumonia due to organism    Presence of other cardiac implants and grafts    Rheumatoid arthritis (HCC)    Sleep disturbance    Stool incontinence    Visual impairment    glasses/contacts    Wears glasses      Social History:  Social History     Socioeconomic History    Marital status:    Occupational History    Occupation:    Tobacco Use    Smoking status: Never     Passive exposure: Yes    Smokeless tobacco: Never   Vaping Use    Vaping status: Never Used   Substance and Sexual Activity    Alcohol use: Not Currently     Comment: Don't drink because of RA meds I take    Drug use: Never        REVIEW OF SYSTEMS:   GENERAL HEALTH: feels well otherwise, normal appetite  SKIN: denies any unusual skin lesions or rashes  HEENT: denies ear pain, See HPI  RESPIRATORY: denies shortness of breath or wheezing  CARDIOVASCULAR: denies chest pain or palpitations   GI: denies vomiting or diarrhea  NEURO: denies dizziness or lightheadedness    EXAM:   /70   Pulse 78   Temp 98.1 °F (36.7 °C)   Resp 16   Ht 5' 3\" (1.6 m)   Wt 135 lb (61.2 kg)   SpO2 95%   BMI 23.91 kg/m²   GENERAL: well developed, well nourished,in no apparent distress  SKIN: no rashes,no suspicious lesions  HEAD: atraumatic, normocephalic  EYES: conjunctivae clear, EOM intact  EARS: TM's clear, non-injected, no bulging, retraction, or fluid bilaterally  NOSE: nostrils patent, no exudates, nasal mucosa pink and noninflamed  THROAT: oral mucosa pink, moist. Posterior pharynx erythematous and cobblestoned. no exudates. Tonsils 2/4.  Breath not malodorous   NECK: supple, non-tender  LUNGS: clear to auscultation bilaterally, no wheezes or rhonchi. Breathing is non labored.  CARDIO: RRR without murmur  EXTREMITIES: no cyanosis, clubbing or edema  LYMPH: no anterior cervical and submandibular lymphadenopathy.  No posterior cervical or occipital lymphadenopathy.    Recent Results (from the past 24 hour(s))   Strep A Assay  W/Optic    Collection Time: 09/21/24  2:54 PM   Result Value Ref Range    Strep Grp A Screen negative Negative    Control Line Present with a clear background (yes/no) yes Yes/No    Kit Lot # [id number] Numeric    Kit Expiration Date 5-21-25 Date         ASSESSMENT AND PLAN:     Encounter Diagnosis   Name Primary?    Sore throat Yes       Orders Placed This Encounter   Procedures    Strep A Assay W/Optic    SARS-CoV-2 by PCR       Meds & Refills for this Visit:  Requested Prescriptions      No prescriptions requested or ordered in this encounter       Imaging & Consults:  None.    Comfort measures explained and discussed as listed in Patient Instructions    Follow up in 3-5 days if not improving, condition worsens, or fever greater than or equal to 100.4 persists for 72 hours.      Patient Instructions   Your testing will be back in 24-36 hours.    Use OTC meds for comfort as needed--  Ibuprofen/Tylenol for fever/pain  Use Benadryl at bedtime to reduce drainage and promote rest.  Zyrtec/Claritin/Allegra in the AM to reduce nasal drainage without sedation.   Use saline nasal sprays to reduce congestion and thin secretions.   Use Delsym for cough.   Consider applying sara's vapo-rub or eucayptus oil to chest and feet at bedtime to reduce chest and nasal congestion.   Warm tea with honey, cough lozenges, vaporizers/steam etc.    Since your symptoms are very recent, you would benefit from some preventive supplements.   Take vit C 1000mg 3-4 times daily x 7 days  Zinc lozenges (cold-eeze, zicam) 4-5 times daily x 7 days  Vit D 5000IU daily  Take probiotics daily-- or double if you're already taking.     If no better in 2-3 days, follow-up with your PCP for further evaluation.       The patient/parent indicates understanding of these issues and agrees to the plan.  The patient is asked to follow up with their PCP as needed.

## 2024-09-22 LAB — SARS-COV-2 RNA RESP QL NAA+PROBE: NOT DETECTED

## 2024-09-25 ENCOUNTER — PATIENT MESSAGE (OUTPATIENT)
Dept: RHEUMATOLOGY | Facility: CLINIC | Age: 69
End: 2024-09-25

## 2024-09-25 DIAGNOSIS — Z79.899 IMMUNOCOMPROMISED STATE DUE TO DRUG THERAPY (HCC): ICD-10-CM

## 2024-09-25 DIAGNOSIS — J02.9 SORE THROAT: Primary | ICD-10-CM

## 2024-09-25 DIAGNOSIS — D84.821 IMMUNOCOMPROMISED STATE DUE TO DRUG THERAPY (HCC): ICD-10-CM

## 2024-09-25 RX ORDER — AZITHROMYCIN 250 MG/1
TABLET, FILM COATED ORAL
Qty: 6 TABLET | Refills: 0 | Status: SHIPPED | OUTPATIENT
Start: 2024-09-25 | End: 2024-09-29

## 2024-10-21 DIAGNOSIS — M05.79 RHEUMATOID ARTHRITIS INVOLVING MULTIPLE SITES WITH POSITIVE RHEUMATOID FACTOR (HCC): ICD-10-CM

## 2024-10-21 RX ORDER — UPADACITINIB 15 MG/1
1 TABLET, EXTENDED RELEASE ORAL DAILY
Qty: 90 TABLET | Refills: 0 | Status: SHIPPED | OUTPATIENT
Start: 2024-10-21

## 2024-10-21 NOTE — TELEPHONE ENCOUNTER
Last office visit: 8/6/2024    Next Rheum Apt:12/3/2024 [facility] bills,     Last fill: 8/23/2024 60 tab, 0 refill    Sending pt my chart message that she is due for lab work    Labs:   Lab Results   Component Value Date    CREATSERUM 0.93 06/17/2024    ALKPHO 73 06/17/2024    AST 26 06/17/2024    ALT 27 06/17/2024    BILT 0.3 06/17/2024    TP 6.5 06/17/2024    TP 6.2 06/17/2024    ALB 3.6 06/17/2024    ALB 3.93 06/17/2024       Lab Results   Component Value Date    WBC 3.8 (L) 06/17/2024    HGB 13.0 06/17/2024    .0 06/17/2024    NEPRELIM 2.11 06/17/2024    NEPERCENT 54.9 06/17/2024    LYPERCENT 30.2 06/17/2024    NE 2.11 06/17/2024    LYMABS 1.16 06/17/2024

## 2024-11-25 ENCOUNTER — LAB ENCOUNTER (OUTPATIENT)
Dept: LAB | Age: 69
End: 2024-11-25
Attending: INTERNAL MEDICINE
Payer: MEDICARE

## 2024-11-25 DIAGNOSIS — D84.821 IMMUNOCOMPROMISED STATE DUE TO DRUG THERAPY (HCC): ICD-10-CM

## 2024-11-25 DIAGNOSIS — E78.00 ELEVATED LDL CHOLESTEROL LEVEL: ICD-10-CM

## 2024-11-25 DIAGNOSIS — Z79.899 IMMUNOCOMPROMISED STATE DUE TO DRUG THERAPY (HCC): ICD-10-CM

## 2024-11-25 DIAGNOSIS — Z79.899 HIGH RISK MEDICATION USE: ICD-10-CM

## 2024-11-25 DIAGNOSIS — E55.9 VITAMIN D DEFICIENCY: ICD-10-CM

## 2024-11-25 DIAGNOSIS — M81.0 AGE-RELATED OSTEOPOROSIS WITHOUT CURRENT PATHOLOGICAL FRACTURE: ICD-10-CM

## 2024-11-25 DIAGNOSIS — M05.79 RHEUMATOID ARTHRITIS INVOLVING MULTIPLE SITES WITH POSITIVE RHEUMATOID FACTOR (HCC): ICD-10-CM

## 2024-11-25 LAB
ALBUMIN SERPL-MCNC: 4.5 G/DL (ref 3.2–4.8)
ALBUMIN/GLOB SERPL: 1.7 {RATIO} (ref 1–2)
ALP LIVER SERPL-CCNC: 85 U/L
ALT SERPL-CCNC: 11 U/L
ANION GAP SERPL CALC-SCNC: 8 MMOL/L (ref 0–18)
AST SERPL-CCNC: 22 U/L (ref ?–34)
BASOPHILS # BLD AUTO: 0.03 X10(3) UL (ref 0–0.2)
BASOPHILS NFR BLD AUTO: 0.6 %
BILIRUB SERPL-MCNC: 0.5 MG/DL (ref 0.2–1.1)
BUN BLD-MCNC: 11 MG/DL (ref 9–23)
CALCIUM BLD-MCNC: 9.4 MG/DL (ref 8.7–10.4)
CHLORIDE SERPL-SCNC: 106 MMOL/L (ref 98–112)
CHOLEST SERPL-MCNC: 206 MG/DL (ref ?–200)
CO2 SERPL-SCNC: 28 MMOL/L (ref 21–32)
CREAT BLD-MCNC: 0.72 MG/DL
CRP SERPL-MCNC: 1.2 MG/DL (ref ?–0.5)
EGFRCR SERPLBLD CKD-EPI 2021: 89 ML/MIN/1.73M2 (ref 60–?)
EOSINOPHIL # BLD AUTO: 0.14 X10(3) UL (ref 0–0.7)
EOSINOPHIL NFR BLD AUTO: 2.7 %
ERYTHROCYTE [DISTWIDTH] IN BLOOD BY AUTOMATED COUNT: 15.1 %
ERYTHROCYTE [SEDIMENTATION RATE] IN BLOOD: 61 MM/HR
FASTING PATIENT LIPID ANSWER: YES
FASTING STATUS PATIENT QL REPORTED: YES
GLOBULIN PLAS-MCNC: 2.7 G/DL (ref 2–3.5)
GLUCOSE BLD-MCNC: 81 MG/DL (ref 70–99)
HCT VFR BLD AUTO: 39 %
HDLC SERPL-MCNC: 59 MG/DL (ref 40–59)
HGB BLD-MCNC: 12.2 G/DL
IMM GRANULOCYTES # BLD AUTO: 0.01 X10(3) UL (ref 0–1)
IMM GRANULOCYTES NFR BLD: 0.2 %
LDLC SERPL CALC-MCNC: 132 MG/DL (ref ?–100)
LYMPHOCYTES # BLD AUTO: 1.18 X10(3) UL (ref 1–4)
LYMPHOCYTES NFR BLD AUTO: 22.9 %
MCH RBC QN AUTO: 28.8 PG (ref 26–34)
MCHC RBC AUTO-ENTMCNC: 31.3 G/DL (ref 31–37)
MCV RBC AUTO: 92 FL
MONOCYTES # BLD AUTO: 0.43 X10(3) UL (ref 0.1–1)
MONOCYTES NFR BLD AUTO: 8.3 %
NEUTROPHILS # BLD AUTO: 3.36 X10 (3) UL (ref 1.5–7.7)
NEUTROPHILS # BLD AUTO: 3.36 X10(3) UL (ref 1.5–7.7)
NEUTROPHILS NFR BLD AUTO: 65.3 %
NONHDLC SERPL-MCNC: 147 MG/DL (ref ?–130)
OSMOLALITY SERPL CALC.SUM OF ELEC: 292 MOSM/KG (ref 275–295)
PLATELET # BLD AUTO: 364 10(3)UL (ref 150–450)
POTASSIUM SERPL-SCNC: 4 MMOL/L (ref 3.5–5.1)
PROT SERPL-MCNC: 7.2 G/DL (ref 5.7–8.2)
RBC # BLD AUTO: 4.24 X10(6)UL
SODIUM SERPL-SCNC: 142 MMOL/L (ref 136–145)
TRIGL SERPL-MCNC: 83 MG/DL (ref 30–149)
VIT D+METAB SERPL-MCNC: 99.7 NG/ML (ref 30–100)
VLDLC SERPL CALC-MCNC: 15 MG/DL (ref 0–30)
WBC # BLD AUTO: 5.2 X10(3) UL (ref 4–11)

## 2024-11-25 PROCEDURE — 86140 C-REACTIVE PROTEIN: CPT

## 2024-11-25 PROCEDURE — 85025 COMPLETE CBC W/AUTO DIFF WBC: CPT

## 2024-11-25 PROCEDURE — 80061 LIPID PANEL: CPT

## 2024-11-25 PROCEDURE — 80053 COMPREHEN METABOLIC PANEL: CPT

## 2024-11-25 PROCEDURE — 36415 COLL VENOUS BLD VENIPUNCTURE: CPT

## 2024-11-25 PROCEDURE — 82306 VITAMIN D 25 HYDROXY: CPT

## 2024-11-25 PROCEDURE — 85652 RBC SED RATE AUTOMATED: CPT

## 2024-12-03 ENCOUNTER — OFFICE VISIT (OUTPATIENT)
Dept: RHEUMATOLOGY | Facility: CLINIC | Age: 69
End: 2024-12-03
Payer: MEDICARE

## 2024-12-03 VITALS
RESPIRATION RATE: 16 BRPM | WEIGHT: 134 LBS | OXYGEN SATURATION: 99 % | TEMPERATURE: 98 F | HEART RATE: 58 BPM | SYSTOLIC BLOOD PRESSURE: 116 MMHG | BODY MASS INDEX: 23.74 KG/M2 | HEIGHT: 63 IN | DIASTOLIC BLOOD PRESSURE: 64 MMHG

## 2024-12-03 DIAGNOSIS — M81.0 AGE-RELATED OSTEOPOROSIS WITHOUT CURRENT PATHOLOGICAL FRACTURE: ICD-10-CM

## 2024-12-03 DIAGNOSIS — Z79.899 IMMUNOCOMPROMISED STATE DUE TO DRUG THERAPY (HCC): ICD-10-CM

## 2024-12-03 DIAGNOSIS — M05.79 RHEUMATOID ARTHRITIS INVOLVING MULTIPLE SITES WITH POSITIVE RHEUMATOID FACTOR (HCC): Primary | ICD-10-CM

## 2024-12-03 DIAGNOSIS — M15.0 PRIMARY OSTEOARTHRITIS INVOLVING MULTIPLE JOINTS: ICD-10-CM

## 2024-12-03 DIAGNOSIS — R79.82 ELEVATED C-REACTIVE PROTEIN (CRP): ICD-10-CM

## 2024-12-03 DIAGNOSIS — E67.3 HIGH VITAMIN D LEVEL: ICD-10-CM

## 2024-12-03 DIAGNOSIS — D84.821 IMMUNOCOMPROMISED STATE DUE TO DRUG THERAPY (HCC): ICD-10-CM

## 2024-12-03 DIAGNOSIS — Z79.899 HIGH RISK MEDICATION USE: ICD-10-CM

## 2024-12-03 DIAGNOSIS — R70.0 ELEVATED SED RATE: ICD-10-CM

## 2024-12-03 PROCEDURE — G2211 COMPLEX E/M VISIT ADD ON: HCPCS | Performed by: INTERNAL MEDICINE

## 2024-12-03 PROCEDURE — 99214 OFFICE O/P EST MOD 30 MIN: CPT | Performed by: INTERNAL MEDICINE

## 2024-12-03 RX ORDER — UPADACITINIB 15 MG/1
1 TABLET, EXTENDED RELEASE ORAL DAILY
Qty: 90 TABLET | Refills: 0 | Status: SHIPPED | OUTPATIENT
Start: 2024-12-03

## 2024-12-03 NOTE — PROGRESS NOTES
RHEUMATOLOGY FOLLOW UP   Date of visit: 12/03/2024  ?  Chief Complaint   Patient presents with    Rheumatoid Arthritis     3 month f/u. Feeling fine. No joint pain or swelling. No stiffness in the morning. No new symptoms. Converted rapid score of 0.7       ASSESSMENT, DISCUSSION & PLAN   Assessment:  1. Rheumatoid arthritis involving multiple sites with positive rheumatoid factor (HCC)    2. Immunocompromised state due to drug therapy (HCC)    3. Primary osteoarthritis involving multiple joints    4. Age-related osteoporosis without current pathological fracture    5. High risk medication use    6. Elevated sed rate    7. Elevated C-reactive protein (CRP)    8. High vitamin D level            Discussion:  Ms. Nila Glass is a 68 yo woman with hx of seropositive RA as well as osteoporosis and osteoarthritis who has recently moved to the area and interested in transitioning care to new rheumatologist. She has had worsened joint stiffness and swelling particularly of the wrists bilaterally and interested in possibly changing her current regimen.     Last year, there was concern for possible infection due to abnormal knee tap. Follow up with orthopedics has excluded infection. She did get her booster covid vaccination with post-vaccination symptoms almost a week after vaccine. She was restarted on enbrel and methotrexate but had continued worsened joint pain and stiffness.  She has since been switching to orencia and for some reason, discontinued her methotrexate. At a prior visit, we restarted methotrexate, however had elevations in her LFTs. Despite holding med for almost a month, they were stable. I do not feel comfortable continuing/restarting methtorexate at this time.     Since that time, she has been started on Rinvoq and has been doing great overall. Denies any significant morning stiffness or joint pain and felt better than she has in years.   Actually decreased dose of rinvoq every other day due to  her concerns over high cholesterol and initially tolerated decrease.  She has been under a lot of stress revolving around her 's recent dx with head/neck cancer and ongoing treatments. Despite the stress, she has not had any overt flares. Recent labs showed elevations in the inflammatory markers but reports having vaccinations right before labs  Will have her continue every other day rinvoq dosing.  Repeat inflammatory markers in about 4-6 weeks to be sure returning to normal. She lacks overt signs of synovitis on exam.   Recommended decreasing total amt of vitamin d since almost too high.     She does have MGUS for which she is now following with hematology annually.   She also had slightly worsened leukopenia which was thought to be likely related to the Rinvoq therapy. Recommended she continue to follow with heme/onc.   She continues to have constipation, chronic since childhood. Strongly recommended that she push oral hydration since it seems like she does normally stay dehydrated. She had colonoscopy which showed some diverticulosis as well as internal hemorrhoids.  Denies any history of diverticulitis.  Last year, she suffered a fall and broke her wrist which she is doing okay however now has some pinky finger issues and was told that there is a ligamental tear.  Avoided sx intervention.   Updated BMD still showed osteoporosis however,. she still would like to avoid rx intervention for osteoporosis for now. Recommended she continue calcium/vitamin d supplementation.      At this time, she is doing well from an RA standpoint.   Despite her current levels of stress, she has not had shingles recurrence or major RA flare.  Will need to monitor osteoporosis closely but pt still would like to proceed with low dose prednisone. May consider prn rinvoq since it has helped her vs resuming daily dosing.     Okay to keep follow-up in about 3 to 4 months.  Routine labs to be done at that time.   Encouraged patient  to reach out if symptoms worsen in the meantime.    Patient verbalized understanding of above instructions. No further questions at this time.    Code selection for this visit was based on time spent (30min) on date of service in preparing to see the patient, obtaining and/or reviewing separately obtained history, performing a medically appropriate examination, counseling and educating the patient/family/caregiver, ordering medications or testing, referring and communicating with other healthcare providers, documenting clinical information in the E HR, independently interpreting results and communicating results to the patient/family/caregiver and care coordination with the patient's other providers.  ?  Plan:  Diagnoses and all orders for this visit:    Rheumatoid arthritis involving multiple sites with positive rheumatoid factor (HCC)  -     Upadacitinib ER (RINVOQ) 15 MG Oral Tablet 24 Hr; Take 1 tablet by mouth daily.  -     CBC With Differential With Platelet; Future  -     Comp Metabolic Panel (14); Future  -     C-Reactive Protein; Future  -     Sed Rate, Westergren (Automated); Future  -     C-Reactive Protein; Future  -     Sed Rate, Westergren (Automated); Future    Immunocompromised state due to drug therapy (HCC)  -     CBC With Differential With Platelet; Future  -     Comp Metabolic Panel (14); Future    Primary osteoarthritis involving multiple joints    Age-related osteoporosis without current pathological fracture  -     Vitamin D; Future    High risk medication use  -     CBC With Differential With Platelet; Future  -     Comp Metabolic Panel (14); Future    Elevated sed rate  -     C-Reactive Protein; Future  -     Sed Rate, Westergren (Automated); Future  -     C-Reactive Protein; Future  -     Sed Rate, Westergren (Automated); Future    Elevated C-reactive protein (CRP)  -     C-Reactive Protein; Future  -     Sed Rate, Westergren (Automated); Future  -     C-Reactive Protein; Future  -     Sed  Fred Meade (Automated); Future    High vitamin D level  -     Vitamin D; Future                  Return in about 4 months (around 4/3/2025).  ?  HPI   Nila Glass is a 69 year old female with the following active problems who has a hx of seropostive RA on methotrexate and Enbrel. She transitioned care to me several months ago and presents for follow up. Her course was complicated by concern for knee infection. She was then started on Orencia without improvement. She has since been on Rinvoq and presents for follow up today.     Previously on:  Plaquenil- had issues with eyes  Xeljanz by her PCP when first diagnosed - did not notice difference  Humira too short to know if it helped  Enbrel but was having persistent knee pain    Orencia- no improvement of symptoms.   Methotrexate- elevated LFTs so med had to be discontinued      Since her last visit, she has been doing okay.   Is still under stress revolving around her 's health and hx of head/neck cancer.   Despite the stress, has been doing okay.   Did just have vaccinations prior to the labs drawn.  Was doing rinvoq every other day and now doing daily.   Denies joint pain or swelling.   Denies morning stiffness.  Denies any recent falls or recent fractures.   Has not been to travel lately due to 's health.     Denies recurrence of knee pain   Denies recurrence of shingles.     Was having some worsened blurred vision, relates to her macular degeneration. Does have hx of cataract sx years ago. Seen by ophthalmologist about a month ago but feels worsened since that time. Is taking eye vitamins twice daily.     Reminds me previously seen by ortho hand- has a tear in the 5th finger tendon. No plan for surgery at this point. Also noted on prior MRI to have extensive OA changes of the carpus and post-truamtic deformity of the distal radius and ulna.     Continue krill oil.   Hasn't been walking as much as before due to other stressors.     +  dry eyes, feels worsened after cataract surgery (had done July), now using systane and helping. Not doing warm compresses.   Denies dry mouth. Denies dental issues.   Stable constipation- only once weekly BMs but not really needing softeners- dulcolax and milk of mag.     Last colonoscopy was when in [place], around age 58.   Reminds me that she was previously on Forteo injections but had no difference in her BMD.   not interested in medication intervention at this time.  Has established care with oncology- Dr. Simons following for MGUS. Has to follow yearly (around June).     HPI from initial consultation  referred for rheumatologic evaluation due to transition of care since she moved to the area. She has long standing RA.      States she first had pain about 2013, and woke up with severe pain- couldn't walk or use arms/legs. States she was seen by PCP first, given a shot of steroids, symptoms improved but within a week, symptoms came back. Was seen by rheumatologist in [place]. Switched rheum over time due to initial one retiring.  Was started on combination of Enbrel and methotrexate about 7 years ago. Was on higher methotrexate dosing but had side effect- due to nausea. Improved with the lower dosing- has fluctuated between 3-4 tabs now. Was on folic acid but switched to leucovorin but denies specific reasoning.   Seems like she was on Xeljanz by her PCP but unclear. Seems to have talked about Humira instead of Enbrel.   Did try plaquenil but had blurred vision within a week of taking so medication was discontinued.     Initially affect her knees, has since had bilateral knee replacement.  Also affected her wrists bilaterally. Had nodule over the right wrist, also had 'loose tendon' on the right hand. Left wrist surgery in June 2020 but states did not help as much.   Still has stiffness of the hand and the wrist. Was told there was significant signs of RA.   Still has very sensitivity to the touch of the wrist  itself. Has decreased  strength. Has not gone through OT or therapy due to concerns of pandemic.     Has setup PCP locally in [place].  Used to work as .      Still has knee pain as well bilaterally. Would go for drainage and states had 8 vials of fluid drawn and told related to RA.     + morning stiffness improved with activity, felt worse in the left hand; lasts for at least 30 minutes. But still has some stiffness throughout the day. Stiffness returns after being seated/resting for a period of time.   Denies any new nodule formation  Hx of two toes that are  but not clear if due to RA  + hair loss/thinning attributed to methotrexate   + hx of borderline anemia since young age. Was following with hematologist was told there was an abnormal protein in blood. Had to go through bone marrow biopsy. Was told to follow every 6 months. Told no reason for intervention.   + hx of one early miscarriage, able to conceive afterwards and pregnancy relatively normal   + chronic constipation, no blood in stools; last colonoscopy was normal per pt. No diverticulosis/diverticulitis   + hx of plantar fasciitis, improved with in soles   + rare bloody nose, attributes to dryness     The patient denies oral or nasal ulcers, photosensitive rash, elevated or scarring rashes, Raynaud's phenomenon, prior renal or liver disease, or history of seizures. Denies hx of pericarditis or pleuritis.   No history of prior blood clot in the legs or lungs, strokes or ischemic phenomenon.  Denies nonhealing ulcers on the fingertips, trouble swallowing, or severe acid reflux.  The patient denies any history of uveitis, crampy abdominal pain, diarrhea, bloody stools,  nodular painful shin bruises, Achilles heel pain, psoriatic lesions, spooning or pitting of the nails, history of dactylitis.  There are no symptoms of severe dry eyes, dry mouth, or swelling of the cheeks or under the jawbone.   No fevers, chills,  lymphadenopathy, night sweats, unexpected weight loss, easy bruising or bleeding.  Denies chronic sinus infections/disease.  Denies chronic cough or hemoptysis.   Denies obvious blood in the urine.     Family hx:  One niece with autoimmune renal disease   Possible RA in her parents but they had arthritis   3 brothers - one with DM and sister with arthritis     Past Medical History:  Past Medical History:    Arthritis    Back pain    Blurred vision    Change in hair    Constipation    Disorder of thyroid    Frequent urination    Frequent use of laxatives    Hemorrhoids    Hypothyroidism    Leaking of urine    Osteoporosis    Pain in joints    Pneumonia due to organism    Presence of other cardiac implants and grafts    Rheumatoid arthritis (HCC)    Sleep disturbance    Stool incontinence    Visual impairment    glasses/contacts    Wears glasses     Past Surgical History:  Past Surgical History:   Procedure Laterality Date    Appendectomy  over 40 years ago    Cataract Bilateral 2023    Colonoscopy  over 5 years ago    D & c  over 45 years ago    Forearm/wrist surgery unlisted Bilateral     Knee replacement surgery      Abby localization wire 1 site left (cpt=19281) Left     usnure of date, benign.          Other surgical history  Wrists/hands 2 years ago    Tonsillectomy  over 60 years ago   hx of right knee surgical in   Hx appy    Family History:  Family History   Problem Relation Age of Onset    Other (lung cancer) Mother     Cancer Mother          of Lung Cancer    Other (lung cancer) Father     Cancer Father          of Lung Cancer    Arthritis Sister     Diabetes Brother        Social History:  Social History     Socioeconomic History    Marital status:    Occupational History    Occupation:    Tobacco Use    Smoking status: Never     Passive exposure: Yes    Smokeless tobacco: Never   Vaping Use    Vaping status: Never Used   Substance and Sexual Activity     Alcohol use: Not Currently     Comment: Don't drink because of RA meds I take    Drug use: Never     Medications:  Outpatient Medications Marked as Taking for the 12/3/24 encounter (Office Visit) with Karli Goode DO   Medication Sig Dispense Refill    Upadacitinib ER (RINVOQ) 15 MG Oral Tablet 24 Hr Take 1 tablet by mouth daily. 90 tablet 0    azelastine 137 MCG/SPRAY Nasal Solution 1-2 sprays by Each Nare route 2 (two) times daily. Use as needed for congested or runny nose 1 each 3    levothyroxine (SYNTHROID) 75 MCG Oral Tab Take 1 tablet (75 mcg total) by mouth daily. 90 tablet 3    Multiple Vitamins-Minerals (PRESERVISION AREDS 2 OR) Take by mouth.      Cholecalciferol (VITAMIN D) 50 MCG (2000 UT) Oral Cap       Turmeric Curcumin 500 MG Oral Cap Take by mouth.      Krill Oil 350 MG Oral Cap Take by mouth.      Calcium Carbonate-Vitamin D 250-125 MG-UNIT Oral Tab Take 1 tablet by mouth daily.      Multiple Vitamin (MULTI-VITAMIN DAILY OR) Take by mouth.       Modified Medications    Modified Medication Previous Medication    UPADACITINIB ER (RINVOQ) 15 MG ORAL TABLET 24 HR RINVOQ 15 MG Oral Tablet 24 Hr       Take 1 tablet by mouth daily.    TAKE 1 TABLET DAILY     Medications Discontinued During This Encounter   Medication Reason    RINVOQ 15 MG Oral Tablet 24 Hr        ?    Allergies:  Allergies   Allergen Reactions    Penicillins HIVES    Septra [Sulfamethoxazole W/Trimethoprim] OTHER (SEE COMMENTS)     Lowers white blood count    Sulfa Antibiotics OTHER (SEE COMMENTS)     Effects heart       ?  REVIEW OF SYSTEMS   ?  Review of Systems   Constitutional:  Positive for malaise/fatigue. Negative for chills, fever and weight loss.   HENT:  Positive for tinnitus (chronic).    Eyes:  Positive for blurred vision. Negative for pain and redness.   Respiratory:  Positive for cough. Negative for hemoptysis, sputum production and shortness of breath.    Cardiovascular:  Negative for chest pain, palpitations and leg  swelling.   Gastrointestinal:  Positive for constipation (better,chronic). Negative for abdominal pain, blood in stool, diarrhea, heartburn (hx of hiatal hernia) and nausea.   Genitourinary:  Positive for frequency and urgency. Negative for dysuria and hematuria.   Musculoskeletal:  Negative for back pain (stable/chronic/intermittent), joint pain, myalgias and neck pain.   Skin:  Negative for itching and rash.   Neurological:  Negative for dizziness, tingling, weakness and headaches.   Endo/Heme/Allergies:  Negative for environmental allergies. Bruises/bleeds easily (chronic unchanged).   Psychiatric/Behavioral:  The patient does not have insomnia.      PHYSICAL EXAM   Today's Vitals:  Temperature Blood Pressure Heart Rate Resp Rate SpO2   Temp: 97.5 °F (36.4 °C) BP: 116/64 Pulse: 58 Resp: 16 SpO2: 99 %   ?  Current Weight Height BMI BSA Pain   Wt Readings from Last 1 Encounters:   12/03/24 134 lb (60.8 kg)    Height: 5' 3\" (160 cm) Body mass index is 23.74 kg/m². Body surface area is 1.63 meters squared.         Physical Exam  Vitals and nursing note reviewed.   Constitutional:       General: She is not in acute distress.     Appearance: Normal appearance. She is well-developed. She is not diaphoretic.   HENT:      Head: Normocephalic.   Eyes:      General: No scleral icterus.     Extraocular Movements: Extraocular movements intact.      Conjunctiva/sclera: Conjunctivae normal.   Neck:      Vascular: No JVD.      Trachea: No tracheal deviation.   Cardiovascular:      Rate and Rhythm: Normal rate and regular rhythm.      Heart sounds: Normal heart sounds. No murmur heard.  Pulmonary:      Effort: Pulmonary effort is normal. No respiratory distress.      Breath sounds: Normal breath sounds. No wheezing.   Musculoskeletal:         General: Deformity present. No swelling or tenderness.      Cervical back: Neck supple.      Comments: Diffuse OA changes of the hands b/l.  Chronic enlargement of MCPs; active synovitis  over b/l thumb, index and middle. - resolved  Left wrist restricted since prior injury-no active tenderness/swelling  Left pinky flexion deformity nontender  No swelling, tenderness, redness or restriction of motion of the elbows, ankles, or joints of the feet.  Bilateral shoulders with full ROM  Bilateral knees without medial joint line tenderness, mild crepitus, no more effusions  Scoliosis noted with right scapula being more prominent than the left.  No spinous process tenderness.   Lymphadenopathy:      Cervical: No cervical adenopathy.   Skin:     General: Skin is warm and dry.      Findings: No erythema or rash.   Neurological:      Mental Status: She is alert and oriented to person, place, and time.      Cranial Nerves: No cranial nerve deficit.      Gait: Gait normal.   Psychiatric:         Mood and Affect: Mood normal.         Behavior: Behavior normal.       Routine Assessment of Patient Index Data       1. Please check the one that best answers the patients abilities at this time:  A. Dress yourself, including tying shoelaces and doing buttons?: Without any difficulty      B. Get in and out of bed?: Without any difficulty      C. Lift a full cup or glass to your mouth?: Without any difficulty      D. Walk outdoors on flat ground?: Without any difficulty      E. Wash and dry your entire body?: Without any difficulty      F. Bend down to  clothing from the floor?: Without any difficulty      G. Turn regular faucets on and off?: Without any difficulty      H. Get in and out of a car, bus, train, or airplane?: Without any difficulty      I. Walk two miles or three kilometers, if you wish?: Without any difficulty      J. Participate in recreational activities and sports as you would like, if you wish?: With much difficulty      Functional Status (FN) Pre-Score - Rows A-J: 0 A-J FN Converted Score: 0 QNR Functional Status (FN) Pre-Score - Rows A-J: 2 QNR A-J FN Converted Score: 0.7     K. Get a good  night's sleep?: With some difficulty     L. Deal with feelings of anxiety or being nervous?: Without any difficulty     M. Deal with feelings of depression or feeling blue?: Without any difficulty         2. How much pain have you had because of your condition over the past week?:  Patient's Pain Tolerance (PN): 0.5         3. Considering all the ways in which illness and health conditions may affect you at this time, how are you doing?:  Patient's Global Estimate (PTGE): 0.5         RAPID 3 Score and Severity:  RAPID 3 Cumulative Score: 1.7 Severity: Near Remission                 Radiology review:         Narrative   PROCEDURE:  XR DEXA BONE DENSITOMETRY (CPT=77080)     COMPARISON:  None.     INDICATIONS:    M81.0 Age-related osteoporosis without current pathological fracture     PATIENT STATED HISTORY: (As transcribed by Technologist)  Age related osteoporosis without current pathological fracture.          LUMBAR SPINE ANALYSIS RESULTS:      Bone mineral density (BMD) (g/cm2):  0.758    Lumbar T-Score:  -2.6      % young normals:  72      % age matched controls:  93      Change from prior spine examination:  n/a              TOTAL HIP ANALYSIS RESULTS:        Bone mineral density (BMD) (g/cm2):  0.684      Total Hip T-Score:  -2.1      % young normals:  73      % age matched controls:  90      Change from prior hip examination:  n/a              FEMORAL NECK ANALYSIS RESULTS:        Bone mineral density (BMD) (g/cm2):  0.597      Femoral neck T-Score:  -2.3      % young normals:  70      % age matched controls:  92      Change from prior hip examination:  n/a            ADDITIONAL FINDINGS:  No significant additional findings.           Impression   CONCLUSION:  Bone mineral density values for lumbar spine are compatible with the diagnosis of osteoporosis by WHO definition (T score less than -2.5). If therapy is initiated, a follow-up study in 1 year may aid in evaluation of therapeutic efficacy.           The  World Health Organization has defined the following categories based on bone density:  Normal bone:  T-score better than -1  Osteopenia: T-score between -1 and -2.5  Osteoporosis:  T-score less than -2.5        LOCATION:  [id number]                Dictated by (CST): Hector Quiñonez MD on 6/05/2023 at 11:18 AM      Finalized by (CST): Hector Quiñonez MD on 6/05/2023 at 11:19 AM       PROCEDURE:  XR SHOULDER, COMPLETE (MIN 2 VIEWS), RIGHT (CPT=73030)          TECHNIQUE:  Multiple views were obtained.       COMPARISON:  None.       INDICATIONS:  M25.511 Right shoulder pain, unspecified chronicity       PATIENT STATED HISTORY: (As transcribed by Technologist)  Pt here for ortho consult. Pt having difficulty raising her right arm for 6 weeks. Pt c/o pain laterally. Hx Rheumatoid Arthritis.        FINDINGS:  No evidence of acute displaced fracture or dislocation.  Osteopenia.  Unremarkable soft tissues.                        Impression   CONCLUSION:  No evidence of acute displaced fracture or dislocation in the right shoulder.       Dictated by (CST): Merrill Pope MD on 1/06/2022 at 8:33 AM       Finalized by (CST): Merrill Pope MD on 1/06/2022 at 8:35 AM       05/2021  Bone density  Lumbar spine T score -2.5  Proximal femur T score -2.2  Total hip T score -2.2  FRAX: Major osteoporotic fracture 25.9%; hip 5.8%  Impression: Osteoporosis        Labs:  Lab Results   Component Value Date    WBC 5.2 11/25/2024    RBC 4.24 11/25/2024    HGB 12.2 11/25/2024    HCT 39.0 11/25/2024    .0 11/25/2024    MCV 92.0 11/25/2024    MCH 28.8 11/25/2024    MCHC 31.3 11/25/2024    RDW 15.1 11/25/2024    NEPRELIM 3.36 11/25/2024    NEPERCENT 65.3 11/25/2024    LYPERCENT 22.9 11/25/2024    MOPERCENT 8.3 11/25/2024    EOPERCENT 2.7 11/25/2024    BAPERCENT 0.6 11/25/2024    NE 3.36 11/25/2024    LYMABS 1.18 11/25/2024    MOABSO 0.43 11/25/2024    EOABSO 0.14 11/25/2024    BAABSO 0.03 11/25/2024     Lab Results   Component Value Date     GLU 81 11/25/2024    BUN 11 11/25/2024    BUNCREA 30.5 (H) 05/04/2021    CREATSERUM 0.72 11/25/2024    ANIONGAP 8 11/25/2024    GFRNAA 64 07/15/2022    GFRAA 73 07/15/2022    CA 9.4 11/25/2024    OSMOCALC 292 11/25/2024    ALKPHO 85 11/25/2024    AST 22 11/25/2024    ALT 11 11/25/2024    BILT 0.5 11/25/2024    TP 7.2 11/25/2024    ALB 4.5 11/25/2024    GLOBULIN 2.7 11/25/2024     11/25/2024    K 4.0 11/25/2024     11/25/2024    CO2 28.0 11/25/2024       Additional Labs:  11/2024  ESR 61 elevated   CRP 1.20 elevated  Vit D 99.7 high normal      07/2024  ESR 27 normal  CRP normal     01/2024  CMP grossly normal  CBC with WBC 3.0 otherwise normal  Vitamin D 66 normal  Ferritin and iron studies normal    On 8/2023  Lipid panel: Total cholesterol 275 elevated; HDL 97; triglycerides 66 normal;  elevated    11/2022  ESR 7 normal  CRP normal  Vitamin D 56.8    07/2022  ESR 9 normal  CRP normal    04/2022  Vit D 29  SPEP with M spike 0.6  CRP 0.41 borderline elevated   ESR 24  PTH normal   CMP with AST 46; ALT 65    03/2022  ESR 5 normal  CRP normal   CMP with  AST 44; ALT 76    02/2022  ESR 50 elevated  CRP 1.30 elevated     11/2021  ESR 57 elevated  CRP 3.20 elevated  SPEP with a clonal spike in gamma region; monoclonal IgG lambda; normal free light chain ratio     08/2021  ESR 44  CRP 1.14    05/2021  CRP 0.42  ESR 24    01/2021  Vit D 54  CRP normal   ESR 11    Records from previous rheum reviewed (scanned in chart)  RF ccp positive started on 5 tab methotrexate and Enbrel   [place] May 2015  Previously tried humira xeljanz plaquenil  Sza listed as allergy   Hx RA, OA, MGUS and osteoporosis   Hx of Left knee aspiration   Increase 6 tab 8/2020    Karli Goode, DO  [facility]  12/03/2024

## 2025-01-15 ENCOUNTER — PATIENT MESSAGE (OUTPATIENT)
Dept: RHEUMATOLOGY | Facility: CLINIC | Age: 70
End: 2025-01-15

## 2025-01-15 DIAGNOSIS — M05.79 RHEUMATOID ARTHRITIS INVOLVING MULTIPLE SITES WITH POSITIVE RHEUMATOID FACTOR (HCC): Primary | ICD-10-CM

## 2025-01-15 RX ORDER — PREDNISONE 5 MG/1
TABLET ORAL
Qty: 30 TABLET | Refills: 0 | Status: SHIPPED | OUTPATIENT
Start: 2025-01-15

## 2025-01-21 ENCOUNTER — TELEPHONE (OUTPATIENT)
Dept: INTERNAL MEDICINE CLINIC | Facility: CLINIC | Age: 70
End: 2025-01-21

## 2025-01-21 DIAGNOSIS — M05.79 RHEUMATOID ARTHRITIS INVOLVING MULTIPLE SITES WITH POSITIVE RHEUMATOID FACTOR (HCC): Primary | ICD-10-CM

## 2025-01-21 DIAGNOSIS — E78.00 PURE HYPERCHOLESTEROLEMIA: ICD-10-CM

## 2025-01-21 DIAGNOSIS — Z00.00 ENCOUNTER FOR ANNUAL HEALTH EXAMINATION: Primary | ICD-10-CM

## 2025-01-21 DIAGNOSIS — E03.9 ACQUIRED HYPOTHYROIDISM: ICD-10-CM

## 2025-01-21 RX ORDER — UPADACITINIB 15 MG/1
1 TABLET, EXTENDED RELEASE ORAL DAILY
Qty: 90 TABLET | Refills: 0 | Status: SHIPPED | OUTPATIENT
Start: 2025-01-21

## 2025-01-21 NOTE — TELEPHONE ENCOUNTER
Orders to      Edward           Pt aware to get labs done no sooner than 2 weeks prior to the appt.  Pt aware to fast.  No call back required.    Future Appointments   Date Time Provider Department Center   5/1/2025  7:20 AM Anthony Soto MD [facility]

## 2025-01-21 NOTE — TELEPHONE ENCOUNTER
Last office visit: 12/3/24    Next Rheum Apt:4/11/2025 Karli Goode DO    Last fill: 12/3/24    Labs:   Lab Results   Component Value Date    CREATSERUM 0.72 11/25/2024    ALKPHO 85 11/25/2024    AST 22 11/25/2024    ALT 11 11/25/2024    BILT 0.5 11/25/2024    TP 7.2 11/25/2024    ALB 4.5 11/25/2024       Lab Results   Component Value Date    WBC 5.2 11/25/2024    HGB 12.2 11/25/2024    .0 11/25/2024    NEPRELIM 3.36 11/25/2024    NEPERCENT 65.3 11/25/2024    LYPERCENT 22.9 11/25/2024    NE 3.36 11/25/2024    LYMABS 1.18 11/25/2024

## 2025-03-06 ENCOUNTER — TELEPHONE (OUTPATIENT)
Facility: CLINIC | Age: 70
End: 2025-03-06

## 2025-03-06 DIAGNOSIS — M05.79 RHEUMATOID ARTHRITIS INVOLVING MULTIPLE SITES WITH POSITIVE RHEUMATOID FACTOR (HCC): ICD-10-CM

## 2025-03-06 RX ORDER — UPADACITINIB 15 MG/1
1 TABLET, EXTENDED RELEASE ORAL DAILY
Qty: 90 TABLET | Refills: 0 | Status: SHIPPED | OUTPATIENT
Start: 2025-03-06

## 2025-03-06 NOTE — TELEPHONE ENCOUNTER
Rinvoq 15 mg      Last office visit: 12/3/2024    Next Rheum Apt:4/11/2025 Karli Goode DO    Last fill: 1/21/2025 90 tab, 0 refills    Sending pt my chart message to get lab work done     Labs:   Lab Results   Component Value Date    CREATSERUM 0.72 11/25/2024    ALKPHO 85 11/25/2024    AST 22 11/25/2024    ALT 11 11/25/2024    BILT 0.5 11/25/2024    TP 7.2 11/25/2024    ALB 4.5 11/25/2024       Lab Results   Component Value Date    WBC 5.2 11/25/2024    HGB 12.2 11/25/2024    .0 11/25/2024    NEPRELIM 3.36 11/25/2024    NEPERCENT 65.3 11/25/2024    LYPERCENT 22.9 11/25/2024    NE 3.36 11/25/2024    LYMABS 1.18 11/25/2024

## 2025-03-10 RX ORDER — LEVOTHYROXINE SODIUM 75 UG/1
75 TABLET ORAL DAILY
Qty: 60 TABLET | Refills: 0 | Status: SHIPPED | OUTPATIENT
Start: 2025-03-10

## 2025-04-07 ENCOUNTER — LAB ENCOUNTER (OUTPATIENT)
Dept: LAB | Age: 70
End: 2025-04-07
Attending: INTERNAL MEDICINE
Payer: MEDICARE

## 2025-04-07 DIAGNOSIS — R70.0 ELEVATED SED RATE: ICD-10-CM

## 2025-04-07 DIAGNOSIS — M81.0 AGE-RELATED OSTEOPOROSIS WITHOUT CURRENT PATHOLOGICAL FRACTURE: ICD-10-CM

## 2025-04-07 DIAGNOSIS — Z79.899 IMMUNOCOMPROMISED STATE DUE TO DRUG THERAPY (HCC): ICD-10-CM

## 2025-04-07 DIAGNOSIS — R79.82 ELEVATED C-REACTIVE PROTEIN (CRP): ICD-10-CM

## 2025-04-07 DIAGNOSIS — D84.821 IMMUNOCOMPROMISED STATE DUE TO DRUG THERAPY (HCC): ICD-10-CM

## 2025-04-07 DIAGNOSIS — E67.3 HIGH VITAMIN D LEVEL: ICD-10-CM

## 2025-04-07 DIAGNOSIS — Z79.899 HIGH RISK MEDICATION USE: ICD-10-CM

## 2025-04-07 DIAGNOSIS — M05.79 RHEUMATOID ARTHRITIS INVOLVING MULTIPLE SITES WITH POSITIVE RHEUMATOID FACTOR (HCC): ICD-10-CM

## 2025-04-07 LAB
ALBUMIN SERPL-MCNC: 4.6 G/DL (ref 3.2–4.8)
ALBUMIN/GLOB SERPL: 1.8 {RATIO} (ref 1–2)
ALP LIVER SERPL-CCNC: 66 U/L
ALT SERPL-CCNC: 18 U/L
ANION GAP SERPL CALC-SCNC: 9 MMOL/L (ref 0–18)
AST SERPL-CCNC: 27 U/L (ref ?–34)
BASOPHILS # BLD AUTO: 0.03 X10(3) UL (ref 0–0.2)
BASOPHILS NFR BLD AUTO: 0.9 %
BILIRUB SERPL-MCNC: 0.7 MG/DL (ref 0.2–1.1)
BUN BLD-MCNC: 14 MG/DL (ref 9–23)
CALCIUM BLD-MCNC: 9.8 MG/DL (ref 8.7–10.6)
CHLORIDE SERPL-SCNC: 105 MMOL/L (ref 98–112)
CO2 SERPL-SCNC: 29 MMOL/L (ref 21–32)
CREAT BLD-MCNC: 0.81 MG/DL
CRP SERPL-MCNC: <0.4 MG/DL (ref ?–0.5)
EGFRCR SERPLBLD CKD-EPI 2021: 77 ML/MIN/1.73M2 (ref 60–?)
EOSINOPHIL # BLD AUTO: 0.16 X10(3) UL (ref 0–0.7)
EOSINOPHIL NFR BLD AUTO: 4.6 %
ERYTHROCYTE [DISTWIDTH] IN BLOOD BY AUTOMATED COUNT: 15.9 %
ERYTHROCYTE [SEDIMENTATION RATE] IN BLOOD: 24 MM/HR
FASTING STATUS PATIENT QL REPORTED: YES
GLOBULIN PLAS-MCNC: 2.5 G/DL (ref 2–3.5)
GLUCOSE BLD-MCNC: 81 MG/DL (ref 70–99)
HCT VFR BLD AUTO: 42.6 %
HGB BLD-MCNC: 14 G/DL
IMM GRANULOCYTES # BLD AUTO: 0 X10(3) UL (ref 0–1)
IMM GRANULOCYTES NFR BLD: 0 %
LYMPHOCYTES # BLD AUTO: 1.05 X10(3) UL (ref 1–4)
LYMPHOCYTES NFR BLD AUTO: 30.2 %
MCH RBC QN AUTO: 29.7 PG (ref 26–34)
MCHC RBC AUTO-ENTMCNC: 32.9 G/DL (ref 31–37)
MCV RBC AUTO: 90.3 FL
MONOCYTES # BLD AUTO: 0.37 X10(3) UL (ref 0.1–1)
MONOCYTES NFR BLD AUTO: 10.6 %
NEUTROPHILS # BLD AUTO: 1.87 X10 (3) UL (ref 1.5–7.7)
NEUTROPHILS # BLD AUTO: 1.87 X10(3) UL (ref 1.5–7.7)
NEUTROPHILS NFR BLD AUTO: 53.7 %
OSMOLALITY SERPL CALC.SUM OF ELEC: 296 MOSM/KG (ref 275–295)
PLATELET # BLD AUTO: 277 10(3)UL (ref 150–450)
POTASSIUM SERPL-SCNC: 4.3 MMOL/L (ref 3.5–5.1)
PROT SERPL-MCNC: 7.1 G/DL (ref 5.7–8.2)
RBC # BLD AUTO: 4.72 X10(6)UL
SODIUM SERPL-SCNC: 143 MMOL/L (ref 136–145)
VIT D+METAB SERPL-MCNC: 59.1 NG/ML (ref 30–100)
WBC # BLD AUTO: 3.5 X10(3) UL (ref 4–11)

## 2025-04-07 PROCEDURE — 36415 COLL VENOUS BLD VENIPUNCTURE: CPT

## 2025-04-07 PROCEDURE — 85652 RBC SED RATE AUTOMATED: CPT

## 2025-04-07 PROCEDURE — 85025 COMPLETE CBC W/AUTO DIFF WBC: CPT

## 2025-04-07 PROCEDURE — 80053 COMPREHEN METABOLIC PANEL: CPT

## 2025-04-07 PROCEDURE — 86140 C-REACTIVE PROTEIN: CPT

## 2025-04-07 PROCEDURE — 82306 VITAMIN D 25 HYDROXY: CPT

## 2025-04-11 ENCOUNTER — OFFICE VISIT (OUTPATIENT)
Dept: RHEUMATOLOGY | Facility: CLINIC | Age: 70
End: 2025-04-11
Payer: MEDICARE

## 2025-04-11 VITALS
WEIGHT: 139 LBS | HEART RATE: 60 BPM | TEMPERATURE: 98 F | BODY MASS INDEX: 24.63 KG/M2 | DIASTOLIC BLOOD PRESSURE: 58 MMHG | OXYGEN SATURATION: 97 % | RESPIRATION RATE: 16 BRPM | SYSTOLIC BLOOD PRESSURE: 100 MMHG | HEIGHT: 63 IN

## 2025-04-11 DIAGNOSIS — R70.0 ELEVATED SED RATE: ICD-10-CM

## 2025-04-11 DIAGNOSIS — Z11.1 SCREENING FOR TUBERCULOSIS: ICD-10-CM

## 2025-04-11 DIAGNOSIS — D84.821 IMMUNOCOMPROMISED STATE DUE TO DRUG THERAPY (HCC): ICD-10-CM

## 2025-04-11 DIAGNOSIS — M15.0 PRIMARY OSTEOARTHRITIS INVOLVING MULTIPLE JOINTS: ICD-10-CM

## 2025-04-11 DIAGNOSIS — Z79.899 IMMUNOCOMPROMISED STATE DUE TO DRUG THERAPY (HCC): ICD-10-CM

## 2025-04-11 DIAGNOSIS — Z79.899 HIGH RISK MEDICATION USE: ICD-10-CM

## 2025-04-11 DIAGNOSIS — M05.79 RHEUMATOID ARTHRITIS INVOLVING MULTIPLE SITES WITH POSITIVE RHEUMATOID FACTOR (HCC): Primary | ICD-10-CM

## 2025-04-11 DIAGNOSIS — E55.9 VITAMIN D DEFICIENCY: ICD-10-CM

## 2025-04-11 DIAGNOSIS — D70.2 OTHER DRUG-INDUCED NEUTROPENIA: ICD-10-CM

## 2025-04-11 DIAGNOSIS — R53.82 CHRONIC FATIGUE: ICD-10-CM

## 2025-04-11 DIAGNOSIS — M81.0 AGE-RELATED OSTEOPOROSIS WITHOUT CURRENT PATHOLOGICAL FRACTURE: ICD-10-CM

## 2025-04-11 PROCEDURE — G2211 COMPLEX E/M VISIT ADD ON: HCPCS | Performed by: INTERNAL MEDICINE

## 2025-04-11 PROCEDURE — 99214 OFFICE O/P EST MOD 30 MIN: CPT | Performed by: INTERNAL MEDICINE

## 2025-04-11 NOTE — PROGRESS NOTES
RHEUMATOLOGY FOLLOW UP   Date of visit: 04/11/2025  ?  Chief Complaint   Patient presents with    Rheumatoid Arthritis     4 month f/u. Feeling good. No swelling. Occasional joint pain. No stiffness in the morning. No new symptoms. Converted rapid score of 1.0       ASSESSMENT, DISCUSSION & PLAN   Assessment:  1. Rheumatoid arthritis involving multiple sites with positive rheumatoid factor (HCC)    2. Immunocompromised state due to drug therapy (HCC)    3. Primary osteoarthritis involving multiple joints    4. Age-related osteoporosis without current pathological fracture    5. High risk medication use    6. Elevated sed rate    7. Chronic fatigue    8. Vitamin D deficiency    9. Screening for tuberculosis    10. Other drug-induced neutropenia          Discussion:  Ms. Nila Glass is a 71 yo woman with hx of seropositive RA as well as osteoporosis and osteoarthritis who has recently moved to the area and interested in transitioning care to new rheumatologist. She has had worsened joint stiffness and swelling particularly of the wrists bilaterally and interested in possibly changing her current regimen.     Last year, there was concern for possible infection due to abnormal knee tap. Follow up with orthopedics has excluded infection. She did get her booster covid vaccination with post-vaccination symptoms almost a week after vaccine. She was restarted on enbrel and methotrexate but had continued worsened joint pain and stiffness.  She has since been switching to orencia and for some reason, discontinued her methotrexate. At a prior visit, we restarted methotrexate, however had elevations in her LFTs. Despite holding med for almost a month, they were stable. I do not feel comfortable continuing/restarting methtorexate at this time.     Since that time, she has been started on Rinvoq and has been doing great overall. Denies any significant morning stiffness or joint pain and felt better than she has in  years.   Actually decreased dose of rinvoq every other day due to her concerns over high cholesterol and initially tolerated decrease.  She has been under a lot of stress revolving around her 's recent dx with head/neck cancer and ongoing treatments. Despite the stress, she has not had any overt flares. Recent labs showed elevations in the inflammatory markers but reports having vaccinations right before labs  Will have her continue every other day rinvoq dosing.  Repeat inflammatory markers in about 4-6 weeks to be sure returning to normal. She lacks overt signs of synovitis on exam.   Recommended decreasing total amt of vitamin d since almost too high.     She does have MGUS for which she is now following with hematology annually.   She also had slightly worsened leukopenia which was thought to be likely related to the Rinvoq therapy.  Previously following with heme/onc.   She continues to have constipation, chronic since childhood. Strongly recommended that she push oral hydration since it seems like she does normally stay dehydrated. She had colonoscopy which showed some diverticulosis as well as internal hemorrhoids.  Denies any history of diverticulitis.  Prior year, she suffered a fall and broke her wrist which she is doing okay however now has some pinky finger issues and was told that there is a ligamental tear.  Avoided sx intervention.   Updated BMD in 2023 still showed osteoporosis however,. she still would like to avoid rx intervention for osteoporosis for now. Recommended she continue calcium/vitamin d supplementation.  Will discuss potential repeat bone density at her next visit.    At this time, she is doing well from an RA standpoint.   She had a slight flare earlier this year.  Responded very quickly to steroids.  She is also now taking the Rinvoq daily instead of every other day.  She did have some slightly worsened leukopenia and reminded that this happened before when she was taking the  Rinvoq regularly.   Okay to keep follow-up in about 4 months.  Routine labs to be done at that time.   Encouraged patient to reach out if symptoms worsen in the meantime.    Of note, patient does feel like her eyes are little yellow, they are not obvious on her exam today.  Recent LFTs and bilirubin were normal.  A repeat hepatic profile was ordered if she feels like this worsens.    Patient verbalized understanding of above instructions. No further questions at this time.    Code selection for this visit was based on time spent (30min) on date of service in preparing to see the patient, obtaining and/or reviewing separately obtained history, performing a medically appropriate examination, counseling and educating the patient/family/caregiver, ordering medications or testing, referring and communicating with other healthcare providers, documenting clinical information in the E HR, independently interpreting results and communicating results to the patient/family/caregiver and care coordination with the patient's other providers.  ?  Plan:  Diagnoses and all orders for this visit:    Rheumatoid arthritis involving multiple sites with positive rheumatoid factor (HCC)  -     Quantiferon TB Plus; Future  -     CBC With Differential With Platelet; Future  -     Comp Metabolic Panel (14); Future  -     C-Reactive Protein; Future  -     Sed Rate, Westergren (Automated); Future  -     Hepatic Function Panel (7) [E]; Future    Immunocompromised state due to drug therapy (HCC)  -     Quantiferon TB Plus; Future  -     CBC With Differential With Platelet; Future  -     Comp Metabolic Panel (14); Future  -     C-Reactive Protein; Future  -     Sed Rate, Westergren (Automated); Future  -     Hepatic Function Panel (7) [E]; Future    Primary osteoarthritis involving multiple joints    Age-related osteoporosis without current pathological fracture    High risk medication use  -     Quantiferon TB Plus; Future  -     CBC With  Differential With Platelet; Future  -     Comp Metabolic Panel (14); Future  -     C-Reactive Protein; Future  -     Sed Rate, Westergren (Automated); Future    Elevated sed rate  -     Quantiferon TB Plus; Future  -     CBC With Differential With Platelet; Future  -     Comp Metabolic Panel (14); Future  -     C-Reactive Protein; Future  -     Sed Rate, Westergren (Automated); Future    Chronic fatigue  -     Quantiferon TB Plus; Future  -     CBC With Differential With Platelet; Future  -     Comp Metabolic Panel (14); Future  -     C-Reactive Protein; Future  -     Sed Rate, Westergren (Automated); Future    Vitamin D deficiency    Screening for tuberculosis  -     Quantiferon TB Plus; Future    Other drug-induced neutropenia  -     CBC With Differential With Platelet; Future                    Return in about 4 months (around 8/11/2025).  ?  HPI   Nila Glass is a 70 year old female with the following active problems who has a hx of seropostive RA on methotrexate and Enbrel. She transitioned care to me several months ago and presents for follow up. Her course was complicated by concern for knee infection. She was then started on Orencia without improvement. She has since been on Rinvoq and presents for follow up today.     Previously on:  Plaquenil- had issues with eyes  Xeljanz by her PCP when first diagnosed - did not notice difference  Humira too short to know if it helped  Enbrel but was having persistent knee pain    Orencia- no improvement of symptoms.   Methotrexate- elevated LFTs so med had to be discontinued      Since her last visit, she has been doing okay.   Is still under stress revolving around her 's health and hx of head/neck cancer.   Suffered a flare in January- took prednisone a few days and resolved- primarily in the thumbs.   Restarted rinvoq around that time. (Was taking every other day before that, and now taking daily)    States she felt for about a week she felt some  skipped beats at night when laying down without other symptoms and resolved on it's own.     Denies joint pain or swelling.   Denies morning stiffness.  Denies any recent falls or recent fractures.   Denies recurrence of knee pain   Denies recurrence of shingles.     Was having some worsened blurred vision, relates to her macular degeneration and was told getting worse. Does have hx of cataract sx years ago. Is taking eye vitamins twice daily. Following with ophthalmology regularly.     Reminds me previously seen by ortho hand- has a tear in the 5th finger tendon. Also noted on prior MRI to have extensive OA changes of the carpus and post-truamtic deformity of the distal radius and ulna.     Continue krill oil.   Hasn't been walking as much as before due to other stressors. Admits to stress eating and some mild weight gain.     + dry eyes, feels worsened after cataract surgery (had done July), using systane and helping. Not doing warm compresses.   Denies dry mouth. Denies dental issues.   Stable constipation- only once weekly BMs but not really needing softeners- dulcolax and milk of mag.     Last colonoscopy was when in [place], around age 58.   Reminds me that she was previously on Forteo injections but had no difference in her BMD.   not interested in medication intervention at this time.  Has established care with oncology- Dr. Simons following for MGUS. Has to follow yearly (around June).     HPI from initial consultation  referred for rheumatologic evaluation due to transition of care since she moved to the area. She has long standing RA.      States she first had pain about 2013, and woke up with severe pain- couldn't walk or use arms/legs. States she was seen by PCP first, given a shot of steroids, symptoms improved but within a week, symptoms came back. Was seen by rheumatologist in [place]. Switched rheum over time due to initial one retiring.  Was started on combination of Enbrel and methotrexate about 7  years ago. Was on higher methotrexate dosing but had side effect- due to nausea. Improved with the lower dosing- has fluctuated between 3-4 tabs now. Was on folic acid but switched to leucovorin but denies specific reasoning.   Seems like she was on Xeljanz by her PCP but unclear. Seems to have talked about Humira instead of Enbrel.   Did try plaquenil but had blurred vision within a week of taking so medication was discontinued.     Initially affect her knees, has since had bilateral knee replacement.  Also affected her wrists bilaterally. Had nodule over the right wrist, also had 'loose tendon' on the right hand. Left wrist surgery in June 2020 but states did not help as much.   Still has stiffness of the hand and the wrist. Was told there was significant signs of RA.   Still has very sensitivity to the touch of the wrist itself. Has decreased  strength. Has not gone through OT or therapy due to concerns of pandemic.     Has setup PCP locally in [place].  Used to work as .      Still has knee pain as well bilaterally. Would go for drainage and states had 8 vials of fluid drawn and told related to RA.     + morning stiffness improved with activity, felt worse in the left hand; lasts for at least 30 minutes. But still has some stiffness throughout the day. Stiffness returns after being seated/resting for a period of time.   Denies any new nodule formation  Hx of two toes that are  but not clear if due to RA  + hair loss/thinning attributed to methotrexate   + hx of borderline anemia since young age. Was following with hematologist was told there was an abnormal protein in blood. Had to go through bone marrow biopsy. Was told to follow every 6 months. Told no reason for intervention.   + hx of one early miscarriage, able to conceive afterwards and pregnancy relatively normal   + chronic constipation, no blood in stools; last colonoscopy was normal per pt. No  diverticulosis/diverticulitis   + hx of plantar fasciitis, improved with in soles   + rare bloody nose, attributes to dryness     The patient denies oral or nasal ulcers, photosensitive rash, elevated or scarring rashes, Raynaud's phenomenon, prior renal or liver disease, or history of seizures. Denies hx of pericarditis or pleuritis.   No history of prior blood clot in the legs or lungs, strokes or ischemic phenomenon.  Denies nonhealing ulcers on the fingertips, trouble swallowing, or severe acid reflux.  The patient denies any history of uveitis, crampy abdominal pain, diarrhea, bloody stools,  nodular painful shin bruises, Achilles heel pain, psoriatic lesions, spooning or pitting of the nails, history of dactylitis.  There are no symptoms of severe dry eyes, dry mouth, or swelling of the cheeks or under the jawbone.   No fevers, chills, lymphadenopathy, night sweats, unexpected weight loss, easy bruising or bleeding.  Denies chronic sinus infections/disease.  Denies chronic cough or hemoptysis.   Denies obvious blood in the urine.     Family hx:  One niece with autoimmune renal disease   Possible RA in her parents but they had arthritis   3 brothers - one with DM and sister with arthritis     Past Medical History:  Past Medical History:    Arthritis    Back pain    Blurred vision    Change in hair    Constipation    Disorder of thyroid    Frequent urination    Frequent use of laxatives    Hemorrhoids    Hypothyroidism    Leaking of urine    Osteoporosis    Pain in joints    Pneumonia due to organism    Presence of other cardiac implants and grafts    Rheumatoid arthritis (HCC)    Sleep disturbance    Stool incontinence    Visual impairment    glasses/contacts    Wears glasses     Past Surgical History:  Past Surgical History:   Procedure Laterality Date    Appendectomy  over 40 years ago    Cataract Bilateral 07/2023    Colonoscopy  over 5 years ago    D & c  over 45 years ago    Forearm/wrist surgery unlisted  Bilateral     Knee replacement surgery      Abby localization wire 1 site left (cpt=[id number]) Left 2006    usnure of date, benign.          Other surgical history  Wrists/hands 2 years ago    Tonsillectomy  over 60 years ago   hx of right knee surgical in 's  Hx appy    Family History:  Family History   Problem Relation Age of Onset    Other (lung cancer) Mother     Cancer Mother          of Lung Cancer    Other (lung cancer) Father     Cancer Father          of Lung Cancer    Arthritis Sister     Diabetes Brother        Social History:  Social History     Socioeconomic History    Marital status:    Occupational History    Occupation:    Tobacco Use    Smoking status: Never     Passive exposure: Yes    Smokeless tobacco: Never   Vaping Use    Vaping status: Never Used   Substance and Sexual Activity    Alcohol use: Not Currently     Comment: Don't drink because of RA meds I take    Drug use: Never     Medications:  Outpatient Medications Marked as Taking for the 25 encounter (Office Visit) with Karli Goode DO   Medication Sig Dispense Refill    levothyroxine 75 MCG Oral Tab TAKE 1 TABLET DAILY 60 tablet 0    Upadacitinib ER (RINVOQ) 15 MG Oral Tablet 24 Hr Take 1 tablet by mouth daily. 90 tablet 0    azelastine 137 MCG/SPRAY Nasal Solution 1-2 sprays by Each Nare route 2 (two) times daily. Use as needed for congested or runny nose 1 each 3    Multiple Vitamins-Minerals (PRESERVISION AREDS 2 OR) Take by mouth.      Turmeric Curcumin 500 MG Oral Cap Take by mouth.      Krill Oil 350 MG Oral Cap Take by mouth.      Calcium Carbonate-Vitamin D 250-125 MG-UNIT Oral Tab Take 1 tablet by mouth daily.      Multiple Vitamin (MULTI-VITAMIN DAILY OR) Take by mouth.       Modified Medications    No medications on file     Medications Discontinued During This Encounter   Medication Reason    COLLAGEN OR        ?    Allergies:  Allergies   Allergen Reactions    Penicillins HIVES     Septra [Sulfamethoxazole W/Trimethoprim] OTHER (SEE COMMENTS)     Lowers white blood count    Sulfa Antibiotics OTHER (SEE COMMENTS)     Effects heart       ?  REVIEW OF SYSTEMS   ?  Review of Systems   Constitutional:  Positive for malaise/fatigue. Negative for chills, fever and weight loss.   HENT:  Positive for tinnitus (chronic).    Eyes:  Positive for blurred vision. Negative for pain and redness.   Respiratory:  Negative for cough (intermitently), hemoptysis, sputum production, shortness of breath and wheezing.    Cardiovascular:  Negative for chest pain, palpitations and leg swelling.   Gastrointestinal:  Positive for constipation (better,chronic) and heartburn (hx of hiatal hernia). Negative for abdominal pain, blood in stool, diarrhea and nausea.   Genitourinary:  Positive for frequency and urgency. Negative for dysuria and hematuria.   Musculoskeletal:  Negative for back pain (stable/chronic/intermittent), joint pain, myalgias and neck pain.   Skin:  Negative for itching and rash.   Neurological:  Negative for dizziness, tingling, weakness and headaches.   Endo/Heme/Allergies:  Negative for environmental allergies. Bruises/bleeds easily (chronic unchanged).     PHYSICAL EXAM   Today's Vitals:  Temperature Blood Pressure Heart Rate Resp Rate SpO2   Temp: 97.9 °F (36.6 °C) BP: 100/58 Pulse: 60 Resp: 16 SpO2: 97 %   ?  Current Weight Height BMI BSA Pain   Wt Readings from Last 1 Encounters:   04/11/25 139 lb (63 kg)    Height: 5' 3\" (160 cm) Body mass index is 24.62 kg/m². Body surface area is 1.66 meters squared.         Physical Exam  Vitals and nursing note reviewed.   Constitutional:       General: She is not in acute distress.     Appearance: Normal appearance. She is well-developed. She is not diaphoretic.   HENT:      Head: Normocephalic.   Eyes:      General: No scleral icterus.     Extraocular Movements: Extraocular movements intact.      Conjunctiva/sclera: Conjunctivae normal.   Neck:       Vascular: No JVD.      Trachea: No tracheal deviation.   Cardiovascular:      Rate and Rhythm: Normal rate and regular rhythm.      Heart sounds: Normal heart sounds. No murmur heard.  Pulmonary:      Effort: Pulmonary effort is normal. No respiratory distress.      Breath sounds: Normal breath sounds. No wheezing.   Musculoskeletal:         General: Deformity present. No swelling or tenderness.      Cervical back: Neck supple.      Comments: Diffuse OA changes of the hands b/l.  Chronic enlargement of MCPs; active synovitis over b/l thumb, index and middle. - resolved  Left wrist restricted since prior injury-no active tenderness/swelling  Left pinky flexion deformity nontender  No swelling, tenderness, redness or restriction of motion of the elbows, ankles, or joints of the feet.  Bilateral shoulders with full ROM  Bilateral knees without medial joint line tenderness, mild crepitus, no more effusions  Scoliosis noted with right scapula being more prominent than the left.  No spinous process tenderness.   Lymphadenopathy:      Cervical: No cervical adenopathy.   Skin:     General: Skin is warm and dry.      Findings: No erythema or rash.   Neurological:      Mental Status: She is alert and oriented to person, place, and time.      Cranial Nerves: No cranial nerve deficit.      Gait: Gait normal.   Psychiatric:         Mood and Affect: Mood normal.         Behavior: Behavior normal.       Tender/swollen joint count: 0    Routine Assessment of Patient Index Data       1. Please check the one that best answers the patients abilities at this time:  A. Dress yourself, including tying shoelaces and doing buttons?: Without any difficulty      B. Get in and out of bed?: Without any difficulty      C. Lift a full cup or glass to your mouth?: Without any difficulty      D. Walk outdoors on flat ground?: Without any difficulty      E. Wash and dry your entire body?: Without any difficulty      F. Bend down to  clothing  from the floor?: Without any difficulty      G. Turn regular faucets on and off?: Without any difficulty      H. Get in and out of a car, bus, train, or airplane?: Without any difficulty      I. Walk two miles or three kilometers, if you wish?: With some difficulty      J. Participate in recreational activities and sports as you would like, if you wish?: With some difficulty      Functional Status (FN) Pre-Score - Rows A-J: 0 A-J FN Converted Score: 0 QNR Functional Status (FN) Pre-Score - Rows A-J: 2 QNR A-J FN Converted Score: 0.7     K. Get a good night's sleep?: With much difficulty     L. Deal with feelings of anxiety or being nervous?: Without any difficulty     M. Deal with feelings of depression or feeling blue?: Without any difficulty         2. How much pain have you had because of your condition over the past week?:  Patient's Pain Tolerance (PN): 1.0         3. Considering all the ways in which illness and health conditions may affect you at this time, how are you doing?:  Patient's Global Estimate (PTGE): 1.0         RAPID 3 Score and Severity:  RAPID 3 Cumulative Score: 2.7 Severity: Near Remission                 Radiology review:         Narrative   PROCEDURE:  XR DEXA BONE DENSITOMETRY (CPT=77080)     COMPARISON:  None.     INDICATIONS:    M81.0 Age-related osteoporosis without current pathological fracture     PATIENT STATED HISTORY: (As transcribed by Technologist)  Age related osteoporosis without current pathological fracture.          LUMBAR SPINE ANALYSIS RESULTS:      Bone mineral density (BMD) (g/cm2):  0.758    Lumbar T-Score:  -2.6      % young normals:  72      % age matched controls:  93      Change from prior spine examination:  n/a              TOTAL HIP ANALYSIS RESULTS:        Bone mineral density (BMD) (g/cm2):  0.684      Total Hip T-Score:  -2.1      % young normals:  73      % age matched controls:  90      Change from prior hip examination:  n/a              FEMORAL NECK  ANALYSIS RESULTS:        Bone mineral density (BMD) (g/cm2):  0.597      Femoral neck T-Score:  -2.3      % young normals:  70      % age matched controls:  92      Change from prior hip examination:  n/a            ADDITIONAL FINDINGS:  No significant additional findings.           Impression   CONCLUSION:  Bone mineral density values for lumbar spine are compatible with the diagnosis of osteoporosis by WHO definition (T score less than -2.5). If therapy is initiated, a follow-up study in 1 year may aid in evaluation of therapeutic efficacy.           The World Health Organization has defined the following categories based on bone density:  Normal bone:  T-score better than -1  Osteopenia: T-score between -1 and -2.5  Osteoporosis:  T-score less than -2.5        LOCATION:  [facility]                Dictated by (CST): Hector Quiñonez MD on 6/05/2023 at 11:18 AM      Finalized by (CST): Hector Quiñonez MD on 6/05/2023 at 11:19 AM       PROCEDURE:  XR SHOULDER, COMPLETE (MIN 2 VIEWS), RIGHT (CPT=73030)          TECHNIQUE:  Multiple views were obtained.       COMPARISON:  None.       INDICATIONS:  M25.511 Right shoulder pain, unspecified chronicity       PATIENT STATED HISTORY: (As transcribed by Technologist)  Pt here for ortho consult. Pt having difficulty raising her right arm for 6 weeks. Pt c/o pain laterally. Hx Rheumatoid Arthritis.        FINDINGS:  No evidence of acute displaced fracture or dislocation.  Osteopenia.  Unremarkable soft tissues.                        Impression   CONCLUSION:  No evidence of acute displaced fracture or dislocation in the right shoulder.       Dictated by (CST): Merrill Pope MD on 1/06/2022 at 8:33 AM       Finalized by (CST): Merrill Pope MD on 1/06/2022 at 8:35 AM       05/2021  Bone density  Lumbar spine T score -2.5  Proximal femur T score -2.2  Total hip T score -2.2  FRAX: Major osteoporotic fracture 25.9%; hip 5.8%  Impression: Osteoporosis        Labs:  Lab Results    Component Value Date    WBC 3.5 (L) 04/07/2025    RBC 4.72 04/07/2025    HGB 14.0 04/07/2025    HCT 42.6 04/07/2025    .0 04/07/2025    MCV 90.3 04/07/2025    MCH 29.7 04/07/2025    MCHC 32.9 04/07/2025    RDW 15.9 04/07/2025    NEPRELIM 1.87 04/07/2025    NEPERCENT 53.7 04/07/2025    LYPERCENT 30.2 04/07/2025    MOPERCENT 10.6 04/07/2025    EOPERCENT 4.6 04/07/2025    BAPERCENT 0.9 04/07/2025    NE 1.87 04/07/2025    LYMABS 1.05 04/07/2025    MOABSO 0.37 04/07/2025    EOABSO 0.16 04/07/2025    BAABSO 0.03 04/07/2025     Lab Results   Component Value Date    GLU 81 04/07/2025    BUN 14 04/07/2025    BUNCREA 30.5 (H) 05/04/2021    CREATSERUM 0.81 04/07/2025    ANIONGAP 9 04/07/2025    GFRNAA 64 07/15/2022    GFRAA 71 07/15/2022    CA 9.8 04/07/2025    OSMOCALC 296 (H) 04/07/2025    ALKPHO 66 04/07/2025    AST 27 04/07/2025    ALT 18 04/07/2025    BILT 0.7 04/07/2025    TP 7.1 04/07/2025    ALB 4.6 04/07/2025    GLOBULIN 2.5 04/07/2025     04/07/2025    K 4.3 04/07/2025     04/07/2025    CO2 29.0 04/07/2025       Additional Labs:  04/2025  Vitamin D 49.1 normal  ESR 24 normal  CRP normal  CMP grossly normal  CBC with WBC 3.5 otherwise normal    11/2024  ESR 61 elevated   CRP 1.20 elevated  Vit D 99.7 high normal      07/2024  ESR 27 normal  CRP normal     01/2024  CMP grossly normal  CBC with WBC 3.0 otherwise normal  Vitamin D 66 normal  Ferritin and iron studies normal    On 8/2023  Lipid panel: Total cholesterol 275 elevated; HDL 97; triglycerides 66 normal;  elevated    11/2022  ESR 7 normal  CRP normal  Vitamin D 56.8    07/2022  ESR 9 normal  CRP normal    04/2022  Vit D 29  SPEP with M spike 0.6  CRP 0.41 borderline elevated   ESR 24  PTH normal   CMP with AST 46; ALT 65    03/2022  ESR 5 normal  CRP normal   CMP with  AST 44; ALT 76    02/2022  ESR 50 elevated  CRP 1.30 elevated     11/2021  ESR 57 elevated  CRP 3.20 elevated  SPEP with a clonal spike in gamma region; monoclonal IgG  lambda; normal free light chain ratio     08/2021  ESR 44  CRP 1.14    05/2021  CRP 0.42  ESR 24    01/2021  Vit D 54  CRP normal   ESR 11    Records from previous rheum reviewed (scanned in chart)  RF ccp positive started on 5 tab methotrexate and Enbrel   Forteo May 2015  Previously tried humira xeljanz plaquenil  Belindaa listed as allergy   Hx RA, OA, MGUS and osteoporosis   Hx of Left knee aspiration   Increase 6 tab 8/2020    aKrli Goode, DO  [facility]  04/11/2025

## 2025-05-01 ENCOUNTER — OFFICE VISIT (OUTPATIENT)
Dept: INTERNAL MEDICINE CLINIC | Facility: CLINIC | Age: 70
End: 2025-05-01
Payer: MEDICARE

## 2025-05-01 VITALS
SYSTOLIC BLOOD PRESSURE: 102 MMHG | BODY MASS INDEX: 23.89 KG/M2 | OXYGEN SATURATION: 97 % | HEART RATE: 80 BPM | TEMPERATURE: 97 F | HEIGHT: 63 IN | DIASTOLIC BLOOD PRESSURE: 58 MMHG | WEIGHT: 134.81 LBS

## 2025-05-01 DIAGNOSIS — E78.00 ELEVATED LDL CHOLESTEROL LEVEL: ICD-10-CM

## 2025-05-01 DIAGNOSIS — M81.0 AGE-RELATED OSTEOPOROSIS WITHOUT CURRENT PATHOLOGICAL FRACTURE: ICD-10-CM

## 2025-05-01 DIAGNOSIS — M05.79 RHEUMATOID ARTHRITIS INVOLVING MULTIPLE SITES WITH POSITIVE RHEUMATOID FACTOR (HCC): ICD-10-CM

## 2025-05-01 DIAGNOSIS — K59.04 CHRONIC IDIOPATHIC CONSTIPATION: ICD-10-CM

## 2025-05-01 DIAGNOSIS — K44.9 HIATAL HERNIA: ICD-10-CM

## 2025-05-01 DIAGNOSIS — D47.2 MGUS (MONOCLONAL GAMMOPATHY OF UNKNOWN SIGNIFICANCE): ICD-10-CM

## 2025-05-01 DIAGNOSIS — M15.0 PRIMARY OSTEOARTHRITIS INVOLVING MULTIPLE JOINTS: ICD-10-CM

## 2025-05-01 DIAGNOSIS — Z12.31 ENCOUNTER FOR SCREENING MAMMOGRAM FOR MALIGNANT NEOPLASM OF BREAST: ICD-10-CM

## 2025-05-01 DIAGNOSIS — D12.3 BENIGN NEOPLASM OF TRANSVERSE COLON: ICD-10-CM

## 2025-05-01 DIAGNOSIS — E03.9 ACQUIRED HYPOTHYROIDISM: ICD-10-CM

## 2025-05-01 DIAGNOSIS — Z00.00 ENCOUNTER FOR ANNUAL HEALTH EXAMINATION: Primary | ICD-10-CM

## 2025-05-01 NOTE — PROGRESS NOTES
Subjective:   Nila Glass is a 70 year old female who presents for a Medicare Subsequent Annual Wellness visit (Pt already had Initial Annual Wellness) and scheduled follow up of multiple significant but stable problems.   History of Present Illness      Since last evaluation the patient reports overall improvement.  Her symptoms of rheumatoid arthritis have improved, and inflammatory markers are within normal limits.  She also reports improved cough, however without resolution.  She experiences an intermittent cough with variable production, however most often following a mild production of cough her cough remains dry.  No associated shortness of breath or wheezing.  Bowel movements are stable, as she generally passes 2 formed stools per week.  She denies any acute concerns at this time.    History/Other:   Fall Risk Assessment:   She has been screened for Falls and is High Risk. Fall Prevention information provided to patient in After Visit Summary.    Do you feel unsteady when standing or walking?: (Patient-Rptd) Yes  Do you worry about falling?: (Patient-Rptd) No  Have you fallen in the past year?: (Patient-Rptd) No     Cognitive Assessment:   She had a completely normal cognitive assessment - see flowsheet entries     Functional Ability/Status:   Nila Glass has some abnormal functions as listed below:  She has Vision problems based on screening of functional status.       Depression Screening (PHQ):  PHQ-2 SCORE: 0  , done 5/1/2025        5 minutes spent screening and counseling for depression    Advanced Directives:   She does NOT have a Living Will. [Do you have a living will?: (Patient-Rptd) No]  She does have a POA but we do NOT have it on file in Epic.    Discussed Advance Care Planning with patient (and family/surrogate if present). Standard forms made available to patient in After Visit Summary.      Patient Active Problem List   Diagnosis    Rheumatoid arthritis involving multiple  sites (HCC)    Age-related osteoporosis without current pathological fracture    Chronic idiopathic constipation    Acquired hypothyroidism    MGUS (monoclonal gammopathy of unknown significance)    Special screening for malignant neoplasm of colon    Benign neoplasm of transverse colon    Primary osteoarthritis involving multiple joints    Immunocompromised state due to drug therapy (HCC)     Allergies:  She is allergic to penicillins, septra [sulfamethoxazole w/trimethoprim], and sulfa antibiotics.    Current Medications:  Outpatient Medications Marked as Taking for the 5/1/25 encounter (Office Visit) with Anthony Soto MD   Medication Sig    levothyroxine 75 MCG Oral Tab TAKE 1 TABLET DAILY    Upadacitinib ER (RINVOQ) 15 MG Oral Tablet 24 Hr Take 1 tablet by mouth daily.    azelastine 137 MCG/SPRAY Nasal Solution 1-2 sprays by Each Nare route 2 (two) times daily. Use as needed for congested or runny nose    Multiple Vitamins-Minerals (PRESERVISION AREDS 2 OR) Take by mouth.    Turmeric Curcumin 500 MG Oral Cap Take by mouth.    Krill Oil 350 MG Oral Cap Take by mouth.    Calcium Carbonate-Vitamin D 250-125 MG-UNIT Oral Tab Take 1 tablet by mouth daily.    Multiple Vitamin (MULTI-VITAMIN DAILY OR) Take by mouth.       Medical History:  She  has a past medical history of Arthritis (I don’t know), Back pain (occasionally), Blurred vision (sometimes), Change in hair (For several years now), Constipation (On and off all my life), Disorder of thyroid, Frequent urination (All the time when I drink water frequently), Frequent use of laxatives (This week use Miralax), Hemorrhoids (occasionally), Hypothyroidism, Leaking of urine (Have to wear a panty shield), Osteoporosis (For at least 10 years), Pain in joints (Sometimes), Pneumonia due to organism (more than 10 years ago), Presence of other cardiac implants and grafts (Knee replacements), Rheumatoid arthritis (HCC), Sleep disturbance (Wake up on and off throughout the  night), Stool incontinence (On and off all my life), Visual impairment, and Wears glasses (glasses).  Surgical History:  She  has a past surgical history that includes knee replacement surgery; appendectomy (over 40 years ago); colonoscopy (over 5 years ago); milli localization wire 1 site left (cpt=19281) (Left, ); cataract (Bilateral, 2023); forearm/wrist surgery unlisted (Bilateral); d & c (over 45 years ago);  (); tonsillectomy (over 60 years ago); and other surgical history (Wrists/hands 2 years ago).   Family History:  Her family history includes Arthritis in her sister; Cancer in her father and mother; Diabetes in her brother; lung cancer in her father and mother.  Social History:  She  reports that she has never smoked. She has been exposed to tobacco smoke. She has never used smokeless tobacco. She reports that she does not currently use alcohol. She reports that she does not use drugs.    Tobacco:  She has never smoked tobacco.    CAGE Alcohol Screen:   CAGE screening score of 0 on 2025, showing low risk of alcohol abuse.      Patient Care Team:  Anthony Soto MD as PCP - General (Internal Medicine)  Karli Goode DO (RHEUMATOLOGY)  Teresa Moreno PT as Physical Therapist  Marah Jenkins RN as Registered Nurse (Registered Nurse)    Review of Systems  GENERAL: feels well otherwise  SKIN: denies any unusual skin lesions  EYES: denies blurred vision or double vision  HEENT: denies nasal congestion, sinus pain or ST  LUNGS: denies shortness of breath with exertion  CARDIOVASCULAR: denies chest pain on exertion  GI: denies abdominal pain, denies heartburn  : denies dysuria, vaginal discharge or itching, no complaint of urinary incontinence   MUSCULOSKELETAL: denies back pain  NEURO: denies headaches  PSYCHE: denies depression or anxiety  HEMATOLOGIC: denies hx of anemia  ENDOCRINE: denies thyroid history  ALL/ASTHMA: denies hx of allergy or asthma    Objective:   Physical  Exam  General Appearance:  Alert, cooperative, no distress, appears stated age   Head:  Normocephalic, without obvious abnormality, atraumatic   Eyes:  Bilateral conjunctiva within normal limits   Ears:  Tympanic membrane within normal limits bilaterally   Nose: Deferred   Throat: Deferred   Neck: Supple, symmetrical, trachea midline, no adenopathy;  thyroid: not enlarged, symmetric, no tenderness/mass/nodules; no carotid bruit or JVD   Back:   Symmetric, no curvature, ROM normal, no CVA tenderness   No edema Clear to auscultation bilaterally, respirations unlabored   Heart:  Regular rate and rhythm, S1 and S2 normal, no murmur, rub, or gallop   Abdomen:   Soft, non-tender, bowel sounds active all four quadrants,  no masses, no organomegaly   Pelvic: Deferred   Extremities: No edema   Pulses: 2+ and symmetric   Skin: Skin color, texture, turgor normal, no rashes or lesions   Lymph nodes: Cervical nodes normal   Neurologic: Grossly normal       /58 (BP Location: Right arm, Patient Position: Sitting, Cuff Size: adult)   Pulse 80   Temp 96.8 °F (36 °C) (Tympanic)   Ht 5' 3\" (1.6 m)   Wt 134 lb 12.8 oz (61.1 kg)   SpO2 97%   BMI 23.88 kg/m²  Estimated body mass index is 23.88 kg/m² as calculated from the following:    Height as of this encounter: 5' 3\" (1.6 m).    Weight as of this encounter: 134 lb 12.8 oz (61.1 kg).    Medicare Hearing Assessment:   Hearing Screening    Time taken: 5/1/2025  9:55 AM  Entry User: Kerri Zhang  Screening Method: Finger Rub  Finger Rub Result: Pass         Visual Acuity:   Right Eye Visual Acuity: Corrected Right Eye Chart Acuity: 20/40   Left Eye Visual Acuity: Corrected Left Eye Chart Acuity: 20/40   Both Eyes Visual Acuity: Corrected Both Eyes Chart Acuity: 20/30   Able To Tolerate Visual Acuity: Yes        Assessment & Plan:   Nila Glass is a 70 year old female who presents for a Medicare Assessment.     1. Encounter for annual health examination  (Primary)    Assessment & Plan    Outstanding screening and preventive measures:  Screening for breast cancer: Mammogram ordered for completion in June    RA:  Overall improved symptoms that are generally controlled, and with normal inflammatory markers  Following regularly with rheumatology service     Osteoarthritis of multiple joints:  Primary  Stable  Following with ortho and rheumatology services     Osteoporosis:  Following with rheumatology service     MGUS and leukopenia:  Stable, with white blood cell count of 3.5 and hemoglobin of 14.0  Following with hematology service     Hypothyroidism:  Stable and asymptomatic  Continue levothyroxine 75 mcg daily  TSH pending collection     Elevated LDL cholesterol:  Improved during prior study in November; repeat study pending collection  Continue efforts to improve dietary and lifestyle habits  Continue use of Krill oil     Chronic idiopathic constipation:  Stable, with 2 formed bowel movements passed twice weekly  Continue infrequent use of MiraLAX    The patient indicates understanding of these issues and agrees to the plan.  Reinforced healthy diet, lifestyle, and exercise.      Return in 6 months (on 11/1/2025).     Anthony Soto MD, 5/1/2025     Supplementary Documentation:   General Health:  In the past six months, have you lost more than 10 pounds without trying?: (Patient-Rptd) 2 - No  Has your appetite been poor?: (Patient-Rptd) No  Type of Diet: (Patient-Rptd) Balanced  How does the patient maintain a good energy level?: (Patient-Rptd) Daily Walks  How would you describe your daily physical activity?: (Patient-Rptd) Light  How would you describe your current health state?: (Patient-Rptd) Good  How do you maintain positive mental well-being?: Social Interaction  On a scale of 0 to 10, with 0 being no pain and 10 being severe pain, what is your pain level?: (Patient-Rptd) 1 - (Mild)  In the past six months, have you experienced urine leakage?: (Patient-Rptd)  1-Yes  At any time do you feel concerned for the safety/well-being of yourself and/or your children, in your home or elsewhere?: (Patient-Rptd) No  Have you had any immunizations at another office such as Influenza, Hepatitis B, Tetanus, or Pneumococcal?: (Patient-Rptd) Yes    Health Maintenance   Topic Date Due    TB Screen  01/27/2022    COVID-19 Vaccine (8 - 2024-25 season) 09/01/2024    Annual Physical  05/08/2025    Mammogram  06/06/2025    Influenza Vaccine (Season Ended) 10/01/2025    Colorectal Cancer Screening  05/24/2030    DEXA Scan  Completed    Annual Depression Screening  Completed    Fall Risk Screening (Annual)  Completed    Pneumococcal Vaccine: 50+ Years  Completed    Zoster Vaccines  Completed    Meningococcal B Vaccine  Aged Out

## 2025-05-08 RX ORDER — LEVOTHYROXINE SODIUM 75 UG/1
75 TABLET ORAL DAILY
Qty: 60 TABLET | Refills: 5 | Status: SHIPPED | OUTPATIENT
Start: 2025-05-08

## 2025-05-15 ENCOUNTER — TELEPHONE (OUTPATIENT)
Facility: CLINIC | Age: 70
End: 2025-05-15

## 2025-05-15 DIAGNOSIS — M25.551 RIGHT HIP PAIN: Primary | ICD-10-CM

## 2025-05-15 NOTE — TELEPHONE ENCOUNTER
Established pt appt for rt hip pain, no imaging avail, pt will arrive early for imaging   Future Appointments   Date Time Provider Department Center   6/7/2025  8:00 AM [facility] [address]   6/20/2025  9:40 AM Carlyle Snow MD [facility]   8/5/2025  8:30 AM Karli Goode, DO [facility] [place]   12/9/2025  9:45 AM Karli Goode, DO [facility] [place]   4/10/2026  9:45 AM Karli Goode, DO [facility] [place]

## 2025-06-07 ENCOUNTER — HOSPITAL ENCOUNTER (OUTPATIENT)
Dept: MAMMOGRAPHY | Age: 70
Discharge: HOME | End: 2025-06-07
Attending: INTERNAL MEDICINE
Payer: MEDICARE

## 2025-06-07 DIAGNOSIS — Z12.31 ENCOUNTER FOR SCREENING MAMMOGRAM FOR MALIGNANT NEOPLASM OF BREAST: ICD-10-CM

## 2025-06-07 PROCEDURE — 77067 SCR MAMMO BI INCL CAD: CPT | Performed by: INTERNAL MEDICINE

## 2025-06-07 PROCEDURE — 77063 BREAST TOMOSYNTHESIS BI: CPT | Performed by: INTERNAL MEDICINE

## 2025-07-30 ENCOUNTER — LAB ENCOUNTER (OUTPATIENT)
Dept: LAB | Age: 70
End: 2025-07-30
Attending: INTERNAL MEDICINE

## 2025-07-30 DIAGNOSIS — Z11.1 SCREENING FOR TUBERCULOSIS: ICD-10-CM

## 2025-07-30 DIAGNOSIS — Z79.899 IMMUNOCOMPROMISED STATE DUE TO DRUG THERAPY (HCC): ICD-10-CM

## 2025-07-30 DIAGNOSIS — D70.2 OTHER DRUG-INDUCED NEUTROPENIA: ICD-10-CM

## 2025-07-30 DIAGNOSIS — M05.79 RHEUMATOID ARTHRITIS INVOLVING MULTIPLE SITES WITH POSITIVE RHEUMATOID FACTOR (HCC): ICD-10-CM

## 2025-07-30 DIAGNOSIS — Z79.899 HIGH RISK MEDICATION USE: ICD-10-CM

## 2025-07-30 DIAGNOSIS — R53.82 CHRONIC FATIGUE: ICD-10-CM

## 2025-07-30 DIAGNOSIS — D84.821 IMMUNOCOMPROMISED STATE DUE TO DRUG THERAPY (HCC): ICD-10-CM

## 2025-07-30 DIAGNOSIS — R70.0 ELEVATED SED RATE: ICD-10-CM

## 2025-07-30 LAB
ALBUMIN SERPL-MCNC: 4.7 G/DL (ref 3.2–4.8)
ALBUMIN/GLOB SERPL: 2 (ref 1–2)
ALP LIVER SERPL-CCNC: 64 U/L (ref 55–142)
ALT SERPL-CCNC: 22 U/L (ref 10–49)
ANION GAP SERPL CALC-SCNC: 10 MMOL/L (ref 0–18)
AST SERPL-CCNC: 35 U/L (ref ?–34)
BASOPHILS # BLD AUTO: 0.02 X10(3) UL (ref 0–0.2)
BASOPHILS NFR BLD AUTO: 0.6 %
BILIRUB DIRECT SERPL-MCNC: 0.2 MG/DL (ref ?–0.3)
BILIRUB SERPL-MCNC: 0.7 MG/DL (ref 0.2–1.1)
BUN BLD-MCNC: 18 MG/DL (ref 9–23)
CALCIUM BLD-MCNC: 9.5 MG/DL (ref 8.7–10.6)
CHLORIDE SERPL-SCNC: 105 MMOL/L (ref 98–112)
CO2 SERPL-SCNC: 28 MMOL/L (ref 21–32)
CREAT BLD-MCNC: 0.86 MG/DL (ref 0.55–1.02)
CRP SERPL-MCNC: <0.5 MG/DL (ref ?–0.5)
EGFRCR SERPLBLD CKD-EPI 2021: 72 ML/MIN/1.73M2 (ref 60–?)
EOSINOPHIL # BLD AUTO: 0.07 X10(3) UL (ref 0–0.7)
EOSINOPHIL NFR BLD AUTO: 2.2 %
ERYTHROCYTE [DISTWIDTH] IN BLOOD BY AUTOMATED COUNT: 14.5 %
ERYTHROCYTE [SEDIMENTATION RATE] IN BLOOD: 14 MM/HR (ref 0–30)
FASTING STATUS PATIENT QL REPORTED: YES
GLOBULIN PLAS-MCNC: 2.4 G/DL (ref 2–3.5)
GLUCOSE BLD-MCNC: 83 MG/DL (ref 70–99)
HCT VFR BLD AUTO: 41.3 % (ref 35–48)
HGB BLD-MCNC: 13.4 G/DL (ref 12–16)
IMM GRANULOCYTES # BLD AUTO: 0 X10(3) UL (ref 0–1)
IMM GRANULOCYTES NFR BLD: 0 %
LYMPHOCYTES # BLD AUTO: 0.85 X10(3) UL (ref 1–4)
LYMPHOCYTES NFR BLD AUTO: 26.8 %
MCH RBC QN AUTO: 28.9 PG (ref 26–34)
MCHC RBC AUTO-ENTMCNC: 32.4 G/DL (ref 31–37)
MCV RBC AUTO: 89.2 FL (ref 80–100)
MONOCYTES # BLD AUTO: 0.33 X10(3) UL (ref 0.1–1)
MONOCYTES NFR BLD AUTO: 10.4 %
NEUTROPHILS # BLD AUTO: 1.9 X10 (3) UL (ref 1.5–7.7)
NEUTROPHILS # BLD AUTO: 1.9 X10(3) UL (ref 1.5–7.7)
NEUTROPHILS NFR BLD AUTO: 60 %
OSMOLALITY SERPL CALC.SUM OF ELEC: 297 MOSM/KG (ref 275–295)
PLATELET # BLD AUTO: 284 10(3)UL (ref 150–450)
POTASSIUM SERPL-SCNC: 4.4 MMOL/L (ref 3.5–5.1)
PROT SERPL-MCNC: 7.1 G/DL (ref 5.7–8.2)
RBC # BLD AUTO: 4.63 X10(6)UL (ref 3.8–5.3)
SODIUM SERPL-SCNC: 143 MMOL/L (ref 136–145)
WBC # BLD AUTO: 3.2 X10(3) UL (ref 4–11)

## 2025-07-30 PROCEDURE — 86480 TB TEST CELL IMMUN MEASURE: CPT

## 2025-07-30 PROCEDURE — 80053 COMPREHEN METABOLIC PANEL: CPT

## 2025-07-30 PROCEDURE — 82248 BILIRUBIN DIRECT: CPT

## 2025-07-30 PROCEDURE — 85652 RBC SED RATE AUTOMATED: CPT

## 2025-07-30 PROCEDURE — 86140 C-REACTIVE PROTEIN: CPT

## 2025-07-30 PROCEDURE — 85025 COMPLETE CBC W/AUTO DIFF WBC: CPT

## 2025-07-30 PROCEDURE — 36415 COLL VENOUS BLD VENIPUNCTURE: CPT

## 2025-08-01 DIAGNOSIS — M05.79 RHEUMATOID ARTHRITIS INVOLVING MULTIPLE SITES WITH POSITIVE RHEUMATOID FACTOR (HCC): Primary | ICD-10-CM

## 2025-08-01 LAB
M TB IFN-G CD4+ T-CELLS BLD-ACNC: 0.03 IU/ML
M TB TUBERC IFN-G BLD QL: NEGATIVE
M TB TUBERC IGNF/MITOGEN IGNF CONTROL: >10 IU/ML
QFT TB1 AG MINUS NIL: 0 IU/ML
QFT TB2 AG MINUS NIL: -0.01 IU/ML

## 2025-08-01 RX ORDER — UPADACITINIB 15 MG/1
1 TABLET, EXTENDED RELEASE ORAL DAILY
Qty: 90 TABLET | Refills: 0 | Status: SHIPPED | OUTPATIENT
Start: 2025-08-01

## 2025-08-05 ENCOUNTER — OFFICE VISIT (OUTPATIENT)
Dept: RHEUMATOLOGY | Facility: CLINIC | Age: 70
End: 2025-08-05

## 2025-08-05 VITALS
TEMPERATURE: 97 F | HEART RATE: 65 BPM | OXYGEN SATURATION: 97 % | SYSTOLIC BLOOD PRESSURE: 100 MMHG | RESPIRATION RATE: 16 BRPM | DIASTOLIC BLOOD PRESSURE: 62 MMHG | WEIGHT: 132.38 LBS | BODY MASS INDEX: 23.46 KG/M2 | HEIGHT: 63 IN

## 2025-08-05 DIAGNOSIS — R53.82 CHRONIC FATIGUE: ICD-10-CM

## 2025-08-05 DIAGNOSIS — D47.2 MGUS (MONOCLONAL GAMMOPATHY OF UNKNOWN SIGNIFICANCE): ICD-10-CM

## 2025-08-05 DIAGNOSIS — Z79.899 IMMUNOCOMPROMISED STATE DUE TO DRUG THERAPY (HCC): ICD-10-CM

## 2025-08-05 DIAGNOSIS — Z79.899 HIGH RISK MEDICATION USE: ICD-10-CM

## 2025-08-05 DIAGNOSIS — M15.0 PRIMARY OSTEOARTHRITIS INVOLVING MULTIPLE JOINTS: ICD-10-CM

## 2025-08-05 DIAGNOSIS — D72.819 LEUKOPENIA, UNSPECIFIED TYPE: ICD-10-CM

## 2025-08-05 DIAGNOSIS — M81.0 AGE-RELATED OSTEOPOROSIS WITHOUT CURRENT PATHOLOGICAL FRACTURE: ICD-10-CM

## 2025-08-05 DIAGNOSIS — R79.89 ABNORMAL LFTS: ICD-10-CM

## 2025-08-05 DIAGNOSIS — M05.79 RHEUMATOID ARTHRITIS INVOLVING MULTIPLE SITES WITH POSITIVE RHEUMATOID FACTOR (HCC): Primary | ICD-10-CM

## 2025-08-05 DIAGNOSIS — D84.821 IMMUNOCOMPROMISED STATE DUE TO DRUG THERAPY (HCC): ICD-10-CM

## 2025-08-05 PROCEDURE — 99214 OFFICE O/P EST MOD 30 MIN: CPT | Performed by: INTERNAL MEDICINE

## 2025-08-05 PROCEDURE — G2211 COMPLEX E/M VISIT ADD ON: HCPCS | Performed by: INTERNAL MEDICINE

## 2025-08-26 ENCOUNTER — PATIENT MESSAGE (OUTPATIENT)
Dept: INTERNAL MEDICINE CLINIC | Facility: CLINIC | Age: 70
End: 2025-08-26

## 2025-08-27 RX ORDER — AZELASTINE HYDROCHLORIDE 137 UG/1
1-2 SPRAY, METERED NASAL 2 TIMES DAILY
Qty: 1 EACH | Refills: 3 | Status: SHIPPED | OUTPATIENT
Start: 2025-08-27 | End: 2025-09-26

## (undated) DIAGNOSIS — Z79.899 ENCOUNTER FOR LONG-TERM CURRENT USE OF HIGH RISK MEDICATION: ICD-10-CM

## (undated) DIAGNOSIS — R74.8 ELEVATED ALKALINE PHOSPHATASE LEVEL: Primary | ICD-10-CM

## (undated) DIAGNOSIS — M17.0 PRIMARY OSTEOARTHRITIS OF BOTH KNEES: Primary | ICD-10-CM

## (undated) NOTE — LETTER
6/18/2021    Dear Dr. Marly Black:     Please see my attached note for my findings and recommendations. Should you have any questions or concerns, please do not hesitate to contact me at number listed below.       Best Regards,      Felisa Green MD  [facility] MEDICA cough  Cardiovascular: Denies any chest pain, palpitations   MSK: see HPI  Skin: Denies any rash or nodules     Medications:  clotrimazole 1 % External Cream, Apply 1 Application topically 2 (two) times daily. , Disp: 12 g, Rfl: 0  triamcinolone acetonide 0 white blood count  Sulfa Antibiotics       OTHER (SEE COMMENTS)    Comment:Effects heart      Objective     06/18/21  0820   BP: 145/60   Pulse: 81   Temp: 97.4 °F (36.3 °C)       GEN: NAD, well-nourished. HEENT: Head: NCAT. Face: No lesions.  Eyes: Conju SSADD, SSB, ANTISSB  No results found for: DSDNA, ANTIDSDNA, SMUD, ANTISM, SM, RNP, ANTIRNP, SMITHRNP  No results found for: SCL70, SCL, UOLLSNG41  No results found for: C3, C4  No results found for: DRVVT, LAINT, PTTLUPUS, LUPUSINTERP, LA, [id number], B2G unspecified whether rheumatoid factor present ([facility]  (primary encounter diagnosis)  Fungal rash of trunk  Dermatitis  Pain in both knees, unspecified chronicity  Age-related osteoporosis without current pathological fracture    Seropositive positive RF, po acetonide 0.1 % External Cream; Apply topically 2 (two) times daily. Use only after clotrimazole cream has been used for 7 days    Pain in both knees, unspecified chronicity  -     Diclofenac Sodium (PENNSAID) 2 % External Solution;  Apply 1 Application top

## (undated) NOTE — Clinical Note
Please start process for Reclast- pt previously on forteo and has significant hiatal hernia would like to avoid oral bisphosphonates.      Ezequiel Romano, DO  [facility]  4/5/2022

## (undated) NOTE — Clinical Note
Willem Masters! Just keeping you in the loop with [facility]. She has some worsened leukopenia which I have seen in a few pts with Rinvoq. However the med is helping SO much for her pain and she feels the best she has in years. Going to recheck in a month and hopefully not any lower. Will keep you posted if worsening.    Thanks!  - Collin Vail

## (undated) NOTE — LETTER
4/30/2021 April 30, 2021      Tanna Casillas, [id number] [phone]  Fax: [phone]    Dear Dr. Ana M Cisneros: Thank you for referring your patient to me for an evaluation.  Please see my attached note for my findings an

## (undated) NOTE — Clinical Note
KRIS Shrestha,   Can you try to see this patient asap? I'm having my MA call your office too. She lacks overt signs of synovitis or systemic symptoms but she is immunocompromised for her RA treatment.  I'm ordering blood cultures and told her to hold her meds unt